# Patient Record
Sex: MALE | Race: WHITE | NOT HISPANIC OR LATINO | Employment: OTHER | URBAN - METROPOLITAN AREA
[De-identification: names, ages, dates, MRNs, and addresses within clinical notes are randomized per-mention and may not be internally consistent; named-entity substitution may affect disease eponyms.]

---

## 2023-04-26 LAB
ALANINE AMINOTRANSFERASE (SGPT) (U/L) IN SER/PLAS: 13 U/L (ref 10–52)
ALBUMIN (G/DL) IN SER/PLAS: 4.3 G/DL (ref 3.4–5)
ALKALINE PHOSPHATASE (U/L) IN SER/PLAS: 52 U/L (ref 33–136)
ANION GAP IN SER/PLAS: 9 MMOL/L (ref 10–20)
ASPARTATE AMINOTRANSFERASE (SGOT) (U/L) IN SER/PLAS: 14 U/L (ref 9–39)
BILIRUBIN TOTAL (MG/DL) IN SER/PLAS: 0.9 MG/DL (ref 0–1.2)
CALCIUM (MG/DL) IN SER/PLAS: 9.2 MG/DL (ref 8.6–10.3)
CARBON DIOXIDE, TOTAL (MMOL/L) IN SER/PLAS: 29 MMOL/L (ref 21–32)
CHLORIDE (MMOL/L) IN SER/PLAS: 108 MMOL/L (ref 98–107)
CHOLESTEROL (MG/DL) IN SER/PLAS: 164 MG/DL (ref 0–199)
CHOLESTEROL IN HDL (MG/DL) IN SER/PLAS: 26.2 MG/DL
CHOLESTEROL/HDL RATIO: 6.3
CREATININE (MG/DL) IN SER/PLAS: 1.01 MG/DL (ref 0.5–1.3)
GFR MALE: 80 ML/MIN/1.73M2
GLUCOSE (MG/DL) IN SER/PLAS: 103 MG/DL (ref 74–99)
LDL: 120 MG/DL (ref 0–99)
POTASSIUM (MMOL/L) IN SER/PLAS: 4.2 MMOL/L (ref 3.5–5.3)
PROTEIN TOTAL: 6.9 G/DL (ref 6.4–8.2)
SODIUM (MMOL/L) IN SER/PLAS: 142 MMOL/L (ref 136–145)
TRIGLYCERIDE (MG/DL) IN SER/PLAS: 87 MG/DL (ref 0–149)
UREA NITROGEN (MG/DL) IN SER/PLAS: 28 MG/DL (ref 6–23)
VLDL: 17 MG/DL (ref 0–40)

## 2023-06-30 ENCOUNTER — HOSPITAL ENCOUNTER (OUTPATIENT)
Dept: DATA CONVERSION | Facility: HOSPITAL | Age: 73
End: 2023-06-30
Attending: INTERNAL MEDICINE | Admitting: INTERNAL MEDICINE
Payer: MEDICARE

## 2023-06-30 DIAGNOSIS — I34.1 NONRHEUMATIC MITRAL (VALVE) PROLAPSE: ICD-10-CM

## 2023-06-30 DIAGNOSIS — I34.0 NONRHEUMATIC MITRAL (VALVE) INSUFFICIENCY: ICD-10-CM

## 2023-06-30 DIAGNOSIS — R94.39 ABNORMAL RESULT OF OTHER CARDIOVASCULAR FUNCTION STUDY: ICD-10-CM

## 2023-06-30 DIAGNOSIS — I35.0 NONRHEUMATIC AORTIC (VALVE) STENOSIS: ICD-10-CM

## 2023-06-30 DIAGNOSIS — I20.0 UNSTABLE ANGINA (MULTI): ICD-10-CM

## 2023-06-30 DIAGNOSIS — E78.5 HYPERLIPIDEMIA, UNSPECIFIED: ICD-10-CM

## 2023-06-30 DIAGNOSIS — Z86.73 PERSONAL HISTORY OF TRANSIENT ISCHEMIC ATTACK (TIA), AND CEREBRAL INFARCTION WITHOUT RESIDUAL DEFICITS: ICD-10-CM

## 2023-06-30 LAB
ANION GAP IN SER/PLAS: 13 MMOL/L (ref 10–20)
CALCIUM (MG/DL) IN SER/PLAS: 9.1 MG/DL (ref 8.6–10.3)
CARBON DIOXIDE, TOTAL (MMOL/L) IN SER/PLAS: 23 MMOL/L (ref 21–32)
CHLORIDE (MMOL/L) IN SER/PLAS: 108 MMOL/L (ref 98–107)
CREATININE (MG/DL) IN SER/PLAS: 1.29 MG/DL (ref 0.5–1.3)
ERYTHROCYTE DISTRIBUTION WIDTH (RATIO) BY AUTOMATED COUNT: 15.5 % (ref 11.5–14.5)
ERYTHROCYTE MEAN CORPUSCULAR HEMOGLOBIN CONCENTRATION (G/DL) BY AUTOMATED: 30.6 G/DL (ref 32–36)
ERYTHROCYTE MEAN CORPUSCULAR VOLUME (FL) BY AUTOMATED COUNT: 87 FL (ref 80–100)
ERYTHROCYTES (10*6/UL) IN BLOOD BY AUTOMATED COUNT: 4.63 X10E12/L (ref 4.5–5.9)
GFR MALE: 59 ML/MIN/1.73M2
GLUCOSE (MG/DL) IN SER/PLAS: 99 MG/DL (ref 74–99)
HEMATOCRIT (%) IN BLOOD BY AUTOMATED COUNT: 40.5 % (ref 41–52)
HEMOGLOBIN (G/DL) IN BLOOD: 12.4 G/DL (ref 13.5–17.5)
LEUKOCYTES (10*3/UL) IN BLOOD BY AUTOMATED COUNT: 12.1 X10E9/L (ref 4.4–11.3)
NRBC (PER 100 WBCS) BY AUTOMATED COUNT: 0 /100 WBC (ref 0–0)
PATIENT TEMPERATURE: 37 DEGREES
PLATELETS (10*3/UL) IN BLOOD AUTOMATED COUNT: 554 X10E9/L (ref 150–450)
POCT BASE EXCESS, MIXED: -1.1 MMOL/L
POCT BASE EXCESS, MIXED: -1.8 MMOL/L
POCT BASE EXCESS, MIXED: 0.3 MMOL/L
POCT HCO3, MIXED: 23.7 MMOL/L
POCT HCO3, MIXED: 25.3 MMOL/L
POCT HCO3, MIXED: 26.5 MMOL/L
POCT OXY HEMOGLOBIN, MIXED: 73.4 % (ref 45–75)
POCT OXY HEMOGLOBIN, MIXED: 73.6 % (ref 45–75)
POCT OXY HEMOGLOBIN, MIXED: 97.5 % (ref 45–75)
POCT PCO2, MIXED: 42 MMHG
POCT PCO2, MIXED: 48 MMHG
POCT PCO2, MIXED: 48 MMHG
POCT PH, MIXED: 7.33
POCT PH, MIXED: 7.35
POCT PH, MIXED: 7.36
POCT PO2, MIXED: 156 MMHG
POCT PO2, MIXED: 48 MMHG
POCT PO2, MIXED: 49 MMHG
POCT SO2, MIXED: 75 %
POCT SO2, MIXED: 75 %
POCT SO2, MIXED: 99 %
POTASSIUM (MMOL/L) IN SER/PLAS: 4.2 MMOL/L (ref 3.5–5.3)
SODIUM (MMOL/L) IN SER/PLAS: 140 MMOL/L (ref 136–145)
UREA NITROGEN (MG/DL) IN SER/PLAS: 39 MG/DL (ref 6–23)

## 2023-07-21 LAB
ANION GAP IN SER/PLAS: 9 MMOL/L (ref 10–20)
CALCIUM (MG/DL) IN SER/PLAS: 9.3 MG/DL (ref 8.6–10.3)
CARBON DIOXIDE, TOTAL (MMOL/L) IN SER/PLAS: 30 MMOL/L (ref 21–32)
CHLORIDE (MMOL/L) IN SER/PLAS: 105 MMOL/L (ref 98–107)
CREATININE (MG/DL) IN SER/PLAS: 1.49 MG/DL (ref 0.5–1.3)
GFR MALE: 50 ML/MIN/1.73M2
GLUCOSE (MG/DL) IN SER/PLAS: 111 MG/DL (ref 74–99)
POTASSIUM (MMOL/L) IN SER/PLAS: 4.4 MMOL/L (ref 3.5–5.3)
SODIUM (MMOL/L) IN SER/PLAS: 140 MMOL/L (ref 136–145)
UREA NITROGEN (MG/DL) IN SER/PLAS: 30 MG/DL (ref 6–23)

## 2023-08-01 ENCOUNTER — PATIENT OUTREACH (OUTPATIENT)
Dept: CARE COORDINATION | Facility: CLINIC | Age: 73
End: 2023-08-01
Payer: MEDICARE

## 2023-08-01 NOTE — PROGRESS NOTES
Medications  Medications reviewed with patient/caregiver?: Yes (8/1/2023 10:49 AM)  Is the patient having any side effects they believe may be caused by any medication additions or changes?: No (8/1/2023 10:49 AM)  Does the patient have all medications ordered at discharge?: Yes (8/1/2023 10:49 AM)  Care Management Interventions: No intervention needed (8/1/2023 10:49 AM)  Is the patient taking all medications as directed (includes completed medication regime)?: Yes (8/1/2023 10:49 AM)    Appointments  Does the patient have a primary care provider?: Yes (Multiple follow up appts are scheduled) (8/1/2023 10:49 AM)  Care Management Interventions: Verified appointment date/time/provider (8/1/2023 10:49 AM)  Has the patient kept scheduled appointments due by today?: Not applicable (8/1/2023 10:49 AM)    Self Management  What is the home health agency?: [facility] (8/1/2023 10:49 AM)  Has home health visited the patient within 72 hours of discharge?: Call prior to 72 hours (8/1/2023 10:49 AM)  Has all Durable Medical Equipment (DME) been delivered?: Yes (using his IS) (8/1/2023 10:49 AM)    Patient Teaching  Does the patient have access to their discharge instructions?: Yes (8/1/2023 10:49 AM)  Care Management Interventions: Reviewed instructions with patient (8/1/2023 10:49 AM)  What is the patient's perception of their health status since discharge?: Improving (8/1/2023 10:49 AM)  Is the patient/caregiver able to teach back the hierarchy of who to call/visit for symptoms/problems? PCP, Specialist, Home Health nurse, Urgent Care, ED, 911: Yes (8/1/2023 10:49 AM)    Wrap Up  Wrap Up Additional Comments: Waldo is doing well, pin well managed, using the IS sporatically-stressed the importance of continued use.  Ambulating. Home care will be calling later today to schedule a visit tomorrow.  Has good supports, denies needs (8/1/2023 10:49 AM)

## 2023-08-08 LAB
ANION GAP IN SER/PLAS: 14 MMOL/L (ref 10–20)
CALCIUM (MG/DL) IN SER/PLAS: 9.6 MG/DL (ref 8.6–10.3)
CARBON DIOXIDE, TOTAL (MMOL/L) IN SER/PLAS: 30 MMOL/L (ref 21–32)
CHLORIDE (MMOL/L) IN SER/PLAS: 102 MMOL/L (ref 98–107)
CREATININE (MG/DL) IN SER/PLAS: 1.17 MG/DL (ref 0.5–1.3)
ERYTHROCYTE DISTRIBUTION WIDTH (RATIO) BY AUTOMATED COUNT: 15.9 % (ref 11.5–14.5)
ERYTHROCYTE MEAN CORPUSCULAR HEMOGLOBIN CONCENTRATION (G/DL) BY AUTOMATED: 30.8 G/DL (ref 32–36)
ERYTHROCYTE MEAN CORPUSCULAR VOLUME (FL) BY AUTOMATED COUNT: 87 FL (ref 80–100)
ERYTHROCYTES (10*6/UL) IN BLOOD BY AUTOMATED COUNT: 4.57 X10E12/L (ref 4.5–5.9)
GFR MALE: 67 ML/MIN/1.73M2
GLUCOSE (MG/DL) IN SER/PLAS: 112 MG/DL (ref 74–99)
HEMATOCRIT (%) IN BLOOD BY AUTOMATED COUNT: 39.9 % (ref 41–52)
HEMOGLOBIN (G/DL) IN BLOOD: 12.3 G/DL (ref 13.5–17.5)
LEUKOCYTES (10*3/UL) IN BLOOD BY AUTOMATED COUNT: 14.2 X10E9/L (ref 4.4–11.3)
MAGNESIUM (MG/DL) IN SER/PLAS: 2.07 MG/DL (ref 1.6–2.4)
NRBC (PER 100 WBCS) BY AUTOMATED COUNT: 0.2 /100 WBC (ref 0–0)
PLATELETS (10*3/UL) IN BLOOD AUTOMATED COUNT: 603 X10E9/L (ref 150–450)
POTASSIUM (MMOL/L) IN SER/PLAS: 5.8 MMOL/L (ref 3.5–5.3)
SODIUM (MMOL/L) IN SER/PLAS: 140 MMOL/L (ref 136–145)
UREA NITROGEN (MG/DL) IN SER/PLAS: 21 MG/DL (ref 6–23)

## 2023-09-19 DIAGNOSIS — I48.91 ATRIAL FIBRILLATION, UNSPECIFIED TYPE (MULTI): Primary | ICD-10-CM

## 2023-09-19 LAB
INR IN PPP BY COAGULATION ASSAY EXTERNAL: 2.1 (ref 2–3)
PROTHROMBIN TIME (PT) IN PPP BY COAGULATION ASSAY EXTERNAL: NORMAL SECONDS
SARS-COV-2 RESULT: DETECTED

## 2023-09-25 PROBLEM — I48.91 ATRIAL FIBRILLATION (MULTI): Status: ACTIVE | Noted: 2023-09-25

## 2023-09-30 ENCOUNTER — TRANSCRIBE ORDERS (OUTPATIENT)
Dept: CARDIOLOGY | Facility: HOSPITAL | Age: 73
End: 2023-09-30
Payer: MEDICARE

## 2023-09-30 DIAGNOSIS — Z98.890 PERSONAL HISTORY OF SURGERY TO HEART AND GREAT VESSELS, PRESENTING HAZARDS TO HEALTH: Primary | ICD-10-CM

## 2023-10-02 ENCOUNTER — CLINICAL SUPPORT (OUTPATIENT)
Dept: CARDIAC REHAB | Facility: CLINIC | Age: 73
End: 2023-10-02
Payer: MEDICARE

## 2023-10-02 DIAGNOSIS — Z98.890 PERSONAL HISTORY OF SURGERY TO HEART AND GREAT VESSELS, PRESENTING HAZARDS TO HEALTH: ICD-10-CM

## 2023-10-02 PROCEDURE — 93798 PHYS/QHP OP CAR RHAB W/ECG: CPT | Performed by: INTERNAL MEDICINE

## 2023-10-02 NOTE — PROGRESS NOTES
Cardiac Rehabilitation Initial Assessment (iITP)    Name: Waldo Cohen  Medical Record Number: [id number]  YOB: 1950    Today’s Date: 10/2/2023  Primary Care Physician: Christopher Meyers MD  Referring Physician: ***    General  1. Personal history of surgery to heart and great vessels, presenting hazards to health  Follow Up In Cardiac Rehab          AACR Risk Stratification:{AACR Risk Stratification:77067}    Medical History  Past Medical History:   Diagnosis Date    Body mass index (BMI) 28.0-28.9, adult     BMI 28.0-28.9,adult    Cerebral infarction, unspecified (CMS/HCC) 10/24/2016    Cryptogenic stroke    Gastrojejunal ulcer, unspecified as acute or chronic, without hemorrhage or perforation     Sepsis-related gastrointestinal ulceration    Overweight     Overweight (BMI 25.0-29.9)    Personal history of other diseases of the circulatory system     History of coronary atherosclerosis     Past Surgical History:   Procedure Laterality Date    MR HEAD ANGIO WO IV CONTRAST  3/1/2022    MR HEAD ANGIO WO IV CONTRAST 3/1/2022 [facility]    MR NECK ANGIO WO IV CONTRAST  3/1/2022    MR NECK ANGIO WO IV CONTRAST 3/1/2022 [facility]    OTHER SURGICAL HISTORY  02/25/2022    Finger amputation    OTHER SURGICAL HISTORY  03/14/2022    Colonoscopy     Allergies: Not on File    Psychosocial Assessment  Patient is returning for a follow up visit after reporting minimal to mild depressive symptoms.     Please document a new PHQ-9 score.    Pt reported/currently experiencing: {Yes/No:50862}  Currently seeing a mental health provider: {Yes/No:52889}  Social Support: {Yes/No:80347}    Learning assessment/barriers: {Assessment/barriers:34701}  Preferred learning method: {Preferred learning method:99610}   Quality of Life Survey:{Quality of Life Survey:75435}    Educational Assessment:  Cardiac Knowledge Test: ***/15    Stages of Change:{Stages of change:39837}    Psychosocial Goals:  ***  Psychosocial Interventions/Education: ***        Nutrition Assessment:    Hyperlipidemia: {Yes/No:77985} ***    Lipids: ***  Lipid Draw Date: ***    Current Dietary Guidelines:{Dietary Guidelines:44986}  Barriers to dietary change: {Yes/No:19855} ***    Diet Habit Survey score: ***    Diabetes Assessment    Diabetes:{Yes or No:50804}   Medication: {Medication:90804}  Last Hemoglobin A1C: ***   Last Hemoglobin A1C date: ***  Pt monitors BS at home: {Yes/No:88021}  Frequency: ***  FBS range: ***  Hypoglycemic Episodes: {Yes/No:15820}    Weight Management:    There were no vitals filed for this visit.  There is no height or weight on file to calculate BMI.    Nutrition Goals: ***  Nutrition Interventions/Education: ***    Exercise Assessment:  Current Home Exercise: {Yes/No:18355}  Mode: ***  Days/Week: ***  Min/Day: ***    Exercise Prescription:    Based on: {{Exercise Prescription:14621}   Frequency:  *** days/week   Mode: {Mode:67890}   Duration: *** total aerobic minutes   Intensity: RPE ***       Target HR ***       METS ***       SpO2 Range *** %       O2 Flow Rate *** L/min     Modality METS Load Duration   1 Pre-Exercise *** *** ***   2 Treadmill *** *** *** ***   3 Nustep 4000 *** *** *** ***   4 Recumbent Cycle *** *** *** ***   5 Weights *** *** *** ***   6 Post-Exercise *** *** ***     Exercise Goals: ***  Exercise Interventions/Education: ***    Other Core Components/Risk Factor Assessment:    Medication adherence:   Current Medications:   Current Outpatient Medications   Medication Sig Dispense Refill    atorvastatin (Lipitor) 40 mg tablet TAKE 1 TABLET BY MOUTH ONCE DAILY 90 tablet 3    atorvastatin (Lipitor) 40 mg tablet TAKE 1 TABLET BY MOUTH ONCE DAILY 30 tablet 0    brivaracetam (Briviact) 50 mg tablet tablet TAKE 1 TABLET BY MOUTH TWO TIMES A DAY 60 tablet 5    chlorhexidine (Hibiclens) 4 % external liquid USE AS DIRECTED 118 mL 0    chlorhexidine (Peridex) 0.12 % solution RINSE MOUTH WITH 15ML  (1 CAPFUL) FOR 30 SECONDS IN THE MORNING AND IN THE EVENING AFTER TOOTHBRUSHING. SPIT OUT AFTER RINSING. DO NOT SWALLOW 473 mL 0    furosemide (Lasix) 20 mg tablet TAKE 1 TABLET BY MOUTH ONCE DAILY FOR 5 DAYS 5 tablet 0    oxyCODONE-acetaminophen (Percocet) 5-325 mg tablet TAKE 1 TABLET BY MOUTH EVERY 6 HOURS FOR 4 DAYS AS NEEDED FOR SEVERE POST SURGICAL PAIN. MAY TAKE TYLENOL WHEN NO LONGER TAKING PERCOCET. 16 tablet 0    pantoprazole (ProtoNix) 40 mg EC tablet TAKE 1 TABLET BY MOUTH TWO TIMES A  tablet 1    warfarin (Coumadin) 2.5 mg tablet TAKE 1 TABLET BY MOUTH ONCE DAILY AS DIRECTED 90 tablet 3    warfarin (Coumadin) 2.5 mg tablet TAKE 1 TABLET BY MOUTH ONCE DAILY 30 tablet 0     No current facility-administered medications for this visit.                 Taking medications as prescribed: {Yes/No:22668}   If no, why: ***   Uses pill box/organizer: {Yes/No:97092}   Carries medication list: {Yes/No:30037}    Blood Pressure Management:  Hx of Hypertension: {Yes/No:57757}  Resting BP: Growth %ile SmartLinks can only be used for patients less than 20 years old.     Heart Failure Management:  Hx of Heart Failure:{Yes/No:56124}  Type (selection): {Type (selection):07702}  Most recent EF: @LVEF%@  Date:***  Onset of heart failure diagnosis: ***  Last heart failure hospitalization: ***  Number of HF admissions per year: ***  Symptoms:{Symptoms:06397}  Is there a family Hx of HF:{Yes/No:21890}  Does patient obtain daily weight {Yes/No:98621}    Smoking/Tobacco Assessment:    Tobacco Use: Not on file       Other Core Component Goals: ***  Other Core Component Interventions/Education: ***       Individual Patient Goals:  ***      Rehaab Staff Signature: Zoie Leroy RN  Electronic MD Review Signature: ***

## 2023-10-03 ENCOUNTER — ANTICOAGULATION - WARFARIN VISIT (OUTPATIENT)
Dept: CARDIOLOGY | Facility: CLINIC | Age: 73
End: 2023-10-03
Payer: MEDICARE

## 2023-10-03 DIAGNOSIS — I48.91 ATRIAL FIBRILLATION, UNSPECIFIED TYPE (MULTI): Primary | ICD-10-CM

## 2023-10-03 LAB
POC INR: 1.6
POC PROTHROMBIN TIME: NORMAL

## 2023-10-03 PROCEDURE — 85610 PROTHROMBIN TIME: CPT | Mod: QW | Performed by: INTERNAL MEDICINE

## 2023-10-03 PROCEDURE — 99211 OFF/OP EST MAY X REQ PHY/QHP: CPT | Mod: PO | Performed by: INTERNAL MEDICINE

## 2023-10-03 PROCEDURE — 85610 PROTHROMBIN TIME: CPT | Mod: PO

## 2023-10-03 PROCEDURE — 99211 OFF/OP EST MAY X REQ PHY/QHP: CPT | Performed by: INTERNAL MEDICINE

## 2023-10-03 NOTE — PROGRESS NOTES
Patient identification verified with 2 identifiers.    Location: [facility] - [address] [place] [id number] [phone]     Referring Physician:  Dr. Pramod Weathers  Enrollment/ Re-enrollment date:  8/3/24   INR Goal: 2.0-3.0  INR monitoring is per [facility] protocol.  Anticoagulation Medication: warfarin  Indication: atrial fibrillation    Subjective   Bleeding signs/symptoms: No    Bruising: No   Major bleeding event: No  Thrombosis signs/symptoms: No  Thromboembolic event: No  Missed doses: No  Extra doses: No  Medication changes: No  Dietary changes: No  Change in health: No  Change in activity: No  Alcohol: No  Other concerns: No    Upcoming Surgeries:  Does the Patient Have any upcoming surgeries that require interruption in anticoagulation therapy? no  Does the patient require bridging? no      Anticoagulation Summary  As of 10/3/2023      INR goal:  2.0-3.0   TTR:  0.0 % (4 d)   INR used for dosin.60 (10/3/2023)   Weekly warfarin total:  45 mg               Assessment/Plan   Subtherapeutic     1. New dose:  Dose maintained but one time dose given tomorrow.   Pt has been therapeutic on current dose.  No explanation for low INR.  Will give a one time dose then maintain current dose and recheck INR in 1 week.  2. Next INR: 1 week      Education provided to patient during the visit:  Patient instructed to call in interim with questions, concerns and changes.

## 2023-10-04 ENCOUNTER — CLINICAL SUPPORT (OUTPATIENT)
Dept: CARDIAC REHAB | Facility: CLINIC | Age: 73
End: 2023-10-04
Payer: MEDICARE

## 2023-10-04 ENCOUNTER — APPOINTMENT (OUTPATIENT)
Dept: CARDIAC REHAB | Facility: CLINIC | Age: 73
End: 2023-10-04
Payer: MEDICARE

## 2023-10-04 ENCOUNTER — TRANSCRIBE ORDERS (OUTPATIENT)
Dept: CARDIAC REHAB | Facility: CLINIC | Age: 73
End: 2023-10-04
Payer: MEDICARE

## 2023-10-04 DIAGNOSIS — Z98.890 PERSONAL HISTORY OF SURGERY TO HEART AND GREAT VESSELS, PRESENTING HAZARDS TO HEALTH: ICD-10-CM

## 2023-10-04 DIAGNOSIS — Z98.890 PERSONAL HISTORY OF SURGERY TO HEART AND GREAT VESSELS, PRESENTING HAZARDS TO HEALTH: Primary | ICD-10-CM

## 2023-10-04 PROCEDURE — 93798 PHYS/QHP OP CAR RHAB W/ECG: CPT | Performed by: INTERNAL MEDICINE

## 2023-10-05 PROBLEM — Z98.890 PERSONAL HISTORY OF SURGERY TO HEART AND GREAT VESSELS, PRESENTING HAZARDS TO HEALTH: Status: ACTIVE | Noted: 2023-10-05

## 2023-10-06 ENCOUNTER — CLINICAL SUPPORT (OUTPATIENT)
Dept: CARDIAC REHAB | Facility: CLINIC | Age: 73
End: 2023-10-06
Payer: MEDICARE

## 2023-10-06 ENCOUNTER — APPOINTMENT (OUTPATIENT)
Dept: CARDIAC REHAB | Facility: CLINIC | Age: 73
End: 2023-10-06
Payer: MEDICARE

## 2023-10-06 DIAGNOSIS — Z98.890 PERSONAL HISTORY OF SURGERY TO HEART AND GREAT VESSELS, PRESENTING HAZARDS TO HEALTH: ICD-10-CM

## 2023-10-06 PROCEDURE — 93798 PHYS/QHP OP CAR RHAB W/ECG: CPT | Performed by: INTERNAL MEDICINE

## 2023-10-09 ENCOUNTER — APPOINTMENT (OUTPATIENT)
Dept: CARDIAC REHAB | Facility: CLINIC | Age: 73
End: 2023-10-09
Payer: MEDICARE

## 2023-10-09 ENCOUNTER — ANTICOAGULATION - WARFARIN VISIT (OUTPATIENT)
Dept: CARDIOLOGY | Facility: CLINIC | Age: 73
End: 2023-10-09
Payer: MEDICARE

## 2023-10-09 ENCOUNTER — CLINICAL SUPPORT (OUTPATIENT)
Dept: CARDIAC REHAB | Facility: CLINIC | Age: 73
End: 2023-10-09
Payer: MEDICARE

## 2023-10-09 DIAGNOSIS — I48.91 ATRIAL FIBRILLATION, UNSPECIFIED TYPE (MULTI): Primary | ICD-10-CM

## 2023-10-09 DIAGNOSIS — Z98.890 PERSONAL HISTORY OF SURGERY TO HEART AND GREAT VESSELS, PRESENTING HAZARDS TO HEALTH: ICD-10-CM

## 2023-10-09 LAB
POC INR: 1.6 (ref 2–3)
POC PROTHROMBIN TIME: ABNORMAL

## 2023-10-09 PROCEDURE — 93798 PHYS/QHP OP CAR RHAB W/ECG: CPT | Performed by: INTERNAL MEDICINE

## 2023-10-09 PROCEDURE — 85610 PROTHROMBIN TIME: CPT | Mod: PO

## 2023-10-09 PROCEDURE — 99211 OFF/OP EST MAY X REQ PHY/QHP: CPT | Mod: 27 | Performed by: INTERNAL MEDICINE

## 2023-10-09 PROCEDURE — 85610 PROTHROMBIN TIME: CPT | Mod: QW | Performed by: INTERNAL MEDICINE

## 2023-10-09 NOTE — PROGRESS NOTES
"Patient identification verified with 2 identifiers.    Location: {[facility] LOCATIONS:[address]}    Referring Physician: ***  Enrollment/ Re-enrollment date: ***   INR Goal: 2.0-3.0  INR monitoring is per [facility] protocol.  Anticoagulation Medication: { AMB ANTICO MEDICATION:73625::\"warfarin\"}  Indication: {indication for anticoagulation:82893}    Subjective   Bleeding signs/symptoms:    { ACOAG bleedin}  Bruising:     Major bleeding event:    Thrombosis signs/symptoms:    Thromboembolic event:    Missed doses:    Extra doses:    Medication changes:    Dietary changes:    Change in health:    Change in activity:    Alcohol:    Other concerns:      Upcoming Surgeries:  Does the Patient Have any upcoming surgeries that require interruption in anticoagulation therapy? {yes/no:79436}  Does the patient require bridging? {yes/no:01452}      Anticoagulation Summary  As of 10/9/2023      INR goal:  2.0-3.0   TTR:  0.0 % (4 d)   INR used for dosing:                 Assessment/Plan   {therapeutic/subtherapeutic/supratherapeutic:60044}     1. New dose: {no change:15490}    2. Next INR: {time frame/weeks 1-4:87692}      Education provided to patient during the visit:  {Antico Pt Ed. documentation:73162::\"Patient instructed to call in interim with questions, concerns and changes. \"}       "

## 2023-10-09 NOTE — PROGRESS NOTES
Patient identification verified with 2 identifiers.    Location: [facility] - [address] [place] [id number] [phone]     Referring Physician: Dr. Pramod Weathers  Enrollment/ Re-enrollment date: 8/3/2024   INR Goal: 2.0-3.0  INR monitoring is per [facility] protocol.  Anticoagulation Medication: warfarin  Indication: atrial fibrillation    Subjective   Bleeding signs/symptoms: No    Bruising: No   Major bleeding event: No  Thrombosis signs/symptoms: No  Thromboembolic event: No  Missed doses: No  Extra doses: No  Medication changes: No  Dietary changes: No  Change in health: No  Change in activity: No  Alcohol: No  Other concerns: No    Upcoming Surgeries:  Does the Patient Have any upcoming surgeries that require interruption in anticoagulation therapy? no  Does the patient require bridging? no      Anticoagulation Summary  As of 10/9/2023      INR goal:  2.0-3.0   TTR:  0.0 % (1.4 wk)   INR used for dosin.60 (10/9/2023)   Weekly warfarin total:  52.5 mg               Assessment/Plan   Subtherapeutic     1. New dose:  Will Increase Pt's weekly dosage from 47.5mg to 52.5mg.  Pt denies any missed doses of warfarin.     2. Next INR: 1 week.  Pt scheduled to RTC in 2 weeks.  He is going out of town tomorrow to [place] and [place] and will return 10/21.      Education provided to patient during the visit:  Patient instructed to call in interim with questions, concerns and changes.   Patient educated on interactions between medications and warfarin.   Patient educated on dietary consistency in vitamin k consumption.   Patient educated on affects of alcohol consumption while taking warfarin.   Patient educated on signs of bleeding/clotting.   Patient educated on compliance with dosing, follow up appointments, and prescribed plan of care.

## 2023-10-11 ENCOUNTER — APPOINTMENT (OUTPATIENT)
Dept: CARDIAC REHAB | Facility: CLINIC | Age: 73
End: 2023-10-11
Payer: MEDICARE

## 2023-10-13 ENCOUNTER — PHARMACY VISIT (OUTPATIENT)
Dept: PHARMACY | Facility: CLINIC | Age: 73
End: 2023-10-13
Payer: COMMERCIAL

## 2023-10-13 ENCOUNTER — APPOINTMENT (OUTPATIENT)
Dept: CARDIAC REHAB | Facility: CLINIC | Age: 73
End: 2023-10-13
Payer: MEDICARE

## 2023-10-13 DIAGNOSIS — K21.9 GASTROESOPHAGEAL REFLUX DISEASE WITHOUT ESOPHAGITIS: Primary | ICD-10-CM

## 2023-10-13 RX ORDER — PANTOPRAZOLE SODIUM 40 MG/1
40 TABLET, DELAYED RELEASE ORAL 2 TIMES DAILY
Qty: 180 TABLET | Refills: 1 | Status: SHIPPED | OUTPATIENT
Start: 2023-10-13 | End: 2023-11-07 | Stop reason: WASHOUT

## 2023-10-16 ENCOUNTER — APPOINTMENT (OUTPATIENT)
Dept: CARDIAC REHAB | Facility: CLINIC | Age: 73
End: 2023-10-16
Payer: MEDICARE

## 2023-10-18 ENCOUNTER — APPOINTMENT (OUTPATIENT)
Dept: CARDIAC REHAB | Facility: CLINIC | Age: 73
End: 2023-10-18
Payer: MEDICARE

## 2023-10-20 ENCOUNTER — APPOINTMENT (OUTPATIENT)
Dept: CARDIAC REHAB | Facility: CLINIC | Age: 73
End: 2023-10-20
Payer: MEDICARE

## 2023-10-23 ENCOUNTER — ANTICOAGULATION - WARFARIN VISIT (OUTPATIENT)
Dept: CARDIOLOGY | Facility: CLINIC | Age: 73
End: 2023-10-23
Payer: MEDICARE

## 2023-10-23 ENCOUNTER — APPOINTMENT (OUTPATIENT)
Dept: CARDIAC REHAB | Facility: CLINIC | Age: 73
End: 2023-10-23
Payer: MEDICARE

## 2023-10-23 ENCOUNTER — CLINICAL SUPPORT (OUTPATIENT)
Dept: CARDIAC REHAB | Facility: CLINIC | Age: 73
End: 2023-10-23
Payer: MEDICARE

## 2023-10-23 DIAGNOSIS — I48.91 ATRIAL FIBRILLATION, UNSPECIFIED TYPE (MULTI): Primary | ICD-10-CM

## 2023-10-23 DIAGNOSIS — Z98.890 PERSONAL HISTORY OF SURGERY TO HEART AND GREAT VESSELS, PRESENTING HAZARDS TO HEALTH: ICD-10-CM

## 2023-10-23 LAB
POC INR: 2.6 (ref 2–3)
POC PROTHROMBIN TIME: NORMAL

## 2023-10-23 PROCEDURE — 99211 OFF/OP EST MAY X REQ PHY/QHP: CPT | Performed by: INTERNAL MEDICINE

## 2023-10-23 PROCEDURE — 85610 PROTHROMBIN TIME: CPT | Mod: QW | Performed by: INTERNAL MEDICINE

## 2023-10-23 PROCEDURE — 93798 PHYS/QHP OP CAR RHAB W/ECG: CPT | Performed by: INTERNAL MEDICINE

## 2023-10-23 NOTE — PROGRESS NOTES
Patient identification verified with 2 identifiers.    Location: [facility] - [address] [place] [id number] [phone]     Referring Physician: Dr. Pramod Weathers  Enrollment/ Re-enrollment date: 8/3/2024   INR Goal: 2.0-3.0  INR monitoring is per [facility] protocol.  Anticoagulation Medication: warfarin  Indication: atrial fibrillation    Subjective   Bleeding signs/symptoms: Yes.  Pt states that he has prolonged bleeding from a dog scratch.    Bruising: Yes.  Pt noted that he has some increased bruising on his arms.  Major bleeding event: No  Thrombosis signs/symptoms: No  Thromboembolic event: No  Missed doses: No  Extra doses: No  Medication changes: No  Dietary changes: No  Change in health: No  Change in activity: No  Alcohol: No  Other concerns: No    Upcoming Surgeries:  Does the Patient Have any upcoming surgeries that require interruption in anticoagulation therapy? no  Does the patient require bridging? no    An Increase was made in weekly dosage last visit.  47.5mg weekly to 52.5mg.  Anticoagulation Summary  As of 10/23/2023      INR goal:  2.0-3.0   TTR:  35.0 % (3.4 wk)   INR used for dosin.60 (10/23/2023)   Weekly warfarin total:  52.5 mg               Assessment/Plan   Subtherapeutic     1. New dose: no change    2. Next INR: 2 weeks.  Pt scheduled to RTC in 2 weeks and can be r/s in 4 weeks if therapeutic next visit.      Education provided to patient during the visit:  Patient instructed to call in interim with questions, concerns and changes.   Patient educated on interactions between medications and warfarin.   Patient educated on dietary consistency in vitamin k consumption.   Patient educated on affects of alcohol consumption while taking warfarin.   Patient educated on signs of bleeding/clotting.   Patient educated on compliance with dosing, follow up appointments, and prescribed plan of care.

## 2023-10-25 ENCOUNTER — CLINICAL SUPPORT (OUTPATIENT)
Dept: CARDIAC REHAB | Facility: CLINIC | Age: 73
End: 2023-10-25
Payer: MEDICARE

## 2023-10-25 ENCOUNTER — APPOINTMENT (OUTPATIENT)
Dept: CARDIAC REHAB | Facility: CLINIC | Age: 73
End: 2023-10-25
Payer: MEDICARE

## 2023-10-25 DIAGNOSIS — Z98.890 PERSONAL HISTORY OF SURGERY TO HEART AND GREAT VESSELS, PRESENTING HAZARDS TO HEALTH: ICD-10-CM

## 2023-10-25 PROCEDURE — 93798 PHYS/QHP OP CAR RHAB W/ECG: CPT | Performed by: INTERNAL MEDICINE

## 2023-10-26 ENCOUNTER — TELEPHONE (OUTPATIENT)
Dept: CARDIOLOGY | Facility: CLINIC | Age: 73
End: 2023-10-26
Payer: MEDICARE

## 2023-10-26 DIAGNOSIS — I48.91 ATRIAL FIBRILLATION, UNSPECIFIED TYPE (MULTI): Primary | ICD-10-CM

## 2023-10-27 ENCOUNTER — CLINICAL SUPPORT (OUTPATIENT)
Dept: CARDIAC REHAB | Facility: CLINIC | Age: 73
End: 2023-10-27
Payer: MEDICARE

## 2023-10-27 ENCOUNTER — APPOINTMENT (OUTPATIENT)
Dept: CARDIAC REHAB | Facility: CLINIC | Age: 73
End: 2023-10-27
Payer: MEDICARE

## 2023-10-27 DIAGNOSIS — Z98.890 PERSONAL HISTORY OF SURGERY TO HEART AND GREAT VESSELS, PRESENTING HAZARDS TO HEALTH: ICD-10-CM

## 2023-10-27 PROCEDURE — 93798 PHYS/QHP OP CAR RHAB W/ECG: CPT | Performed by: INTERNAL MEDICINE

## 2023-10-27 RX ORDER — WARFARIN 2.5 MG/1
2.5 TABLET ORAL
Qty: 90 TABLET | Refills: 3 | Status: SHIPPED | OUTPATIENT
Start: 2023-10-27 | End: 2023-11-28 | Stop reason: SDUPTHER

## 2023-10-30 ENCOUNTER — APPOINTMENT (OUTPATIENT)
Dept: CARDIAC REHAB | Facility: CLINIC | Age: 73
End: 2023-10-30
Payer: MEDICARE

## 2023-10-30 ENCOUNTER — CLINICAL SUPPORT (OUTPATIENT)
Dept: CARDIAC REHAB | Facility: CLINIC | Age: 73
End: 2023-10-30
Payer: MEDICARE

## 2023-10-30 DIAGNOSIS — Z98.890 PERSONAL HISTORY OF SURGERY TO HEART AND GREAT VESSELS, PRESENTING HAZARDS TO HEALTH: ICD-10-CM

## 2023-10-30 PROCEDURE — 93798 PHYS/QHP OP CAR RHAB W/ECG: CPT | Performed by: INTERNAL MEDICINE

## 2023-11-01 ENCOUNTER — CLINICAL SUPPORT (OUTPATIENT)
Dept: CARDIAC REHAB | Facility: CLINIC | Age: 73
End: 2023-11-01
Payer: MEDICARE

## 2023-11-01 ENCOUNTER — APPOINTMENT (OUTPATIENT)
Dept: CARDIAC REHAB | Facility: CLINIC | Age: 73
End: 2023-11-01
Payer: MEDICARE

## 2023-11-01 DIAGNOSIS — Z98.890 PERSONAL HISTORY OF SURGERY TO HEART AND GREAT VESSELS, PRESENTING HAZARDS TO HEALTH: ICD-10-CM

## 2023-11-01 PROCEDURE — 93798 PHYS/QHP OP CAR RHAB W/ECG: CPT

## 2023-11-03 ENCOUNTER — CLINICAL SUPPORT (OUTPATIENT)
Dept: CARDIAC REHAB | Facility: CLINIC | Age: 73
End: 2023-11-03
Payer: MEDICARE

## 2023-11-03 ENCOUNTER — APPOINTMENT (OUTPATIENT)
Dept: CARDIAC REHAB | Facility: CLINIC | Age: 73
End: 2023-11-03
Payer: MEDICARE

## 2023-11-03 DIAGNOSIS — Z98.890 PERSONAL HISTORY OF SURGERY TO HEART AND GREAT VESSELS, PRESENTING HAZARDS TO HEALTH: ICD-10-CM

## 2023-11-03 PROCEDURE — 93798 PHYS/QHP OP CAR RHAB W/ECG: CPT | Performed by: INTERNAL MEDICINE

## 2023-11-06 ENCOUNTER — APPOINTMENT (OUTPATIENT)
Dept: CARDIAC REHAB | Facility: CLINIC | Age: 73
End: 2023-11-06
Payer: MEDICARE

## 2023-11-07 ENCOUNTER — ANTICOAGULATION - WARFARIN VISIT (OUTPATIENT)
Dept: CARDIOLOGY | Facility: CLINIC | Age: 73
End: 2023-11-07
Payer: MEDICARE

## 2023-11-07 DIAGNOSIS — I48.91 ATRIAL FIBRILLATION, UNSPECIFIED TYPE (MULTI): Primary | ICD-10-CM

## 2023-11-07 DIAGNOSIS — G40.909 SEIZURE DISORDER (MULTI): Primary | ICD-10-CM

## 2023-11-07 LAB
POC INR: 2.1 (ref 2–3)
POC PROTHROMBIN TIME: NORMAL

## 2023-11-07 PROCEDURE — 85610 PROTHROMBIN TIME: CPT | Mod: QW | Performed by: INTERNAL MEDICINE

## 2023-11-07 PROCEDURE — 99211 OFF/OP EST MAY X REQ PHY/QHP: CPT | Mod: 27 | Performed by: INTERNAL MEDICINE

## 2023-11-07 NOTE — PROGRESS NOTES
Refill of Briviact 50mg BID [facility]  OARRS reviewed, no concerns  Follow-up scheduled for 1/4/2024

## 2023-11-07 NOTE — PROGRESS NOTES
Patient identification verified with 2 identifiers.    Location: [facility] - [address] [place] [id number] [phone]     Referring Physician: Dr. Pramod Weathers  Enrollment/ Re-enrollment date: 8/3/2024   INR Goal: 2.0-3.0  INR monitoring is per [facility] protocol.  Anticoagulation Medication: warfarin  Indication: atrial fibrillation    Subjective   Bleeding signs/symptoms: No.  Pt states that he has prolonged bleeding from a dog scratch.    Bruising: No.  Pt noted that he has some increased bruising on his arms.  Major bleeding event: No  Thrombosis signs/symptoms: No  Thromboembolic event: No  Missed doses: No  Extra doses: No  Medication changes: No  Dietary changes: No  Change in health: No  Change in activity: No  Alcohol: No  Other concerns: No    Upcoming Surgeries:  Does the Patient Have any upcoming surgeries that require interruption in anticoagulation therapy? no  Does the patient require bridging? no    Dose maintained after last visit, 10/23/23.  Pty taking 52.5mg weekly.  Anticoagulation Summary  As of 2023      INR goal:  2.0-3.0   TTR:  60.0 % (1.3 mo)   INR used for dosin.10 (2023)   Weekly warfarin total:  52.5 mg               Assessment/Plan   Therapeutic     1. New dose: no change    2. Next INR: 1 month.  Pt can be r/s in 4 weeks if therapeutic next visit.  3. Pt has appt. With Dr. Weathers on .  He is inquiring about stopping warfarin.      Education provided to patient during the visit:  Patient instructed to call in interim with questions, concerns and changes.   Patient educated on interactions between medications and warfarin.   Patient educated on dietary consistency in vitamin k consumption.   Patient educated on affects of alcohol consumption while taking warfarin.   Patient educated on signs of bleeding/clotting.   Patient educated on compliance with dosing, follow up appointments, and prescribed plan of care.

## 2023-11-08 ENCOUNTER — CLINICAL SUPPORT (OUTPATIENT)
Dept: CARDIAC REHAB | Facility: CLINIC | Age: 73
End: 2023-11-08
Payer: MEDICARE

## 2023-11-08 ENCOUNTER — APPOINTMENT (OUTPATIENT)
Dept: CARDIAC REHAB | Facility: CLINIC | Age: 73
End: 2023-11-08
Payer: MEDICARE

## 2023-11-08 DIAGNOSIS — Z98.890 PERSONAL HISTORY OF SURGERY TO HEART AND GREAT VESSELS, PRESENTING HAZARDS TO HEALTH: ICD-10-CM

## 2023-11-08 PROCEDURE — 93798 PHYS/QHP OP CAR RHAB W/ECG: CPT | Performed by: INTERNAL MEDICINE

## 2023-11-08 NOTE — PROGRESS NOTES
INDIVIDUAL CARDIAC TREATMENT PLAN- 60 DAY REASSESSMENT     Name: Waldo Cohen   Today's Date: 23   : 1950    Primary Provider: Christopher Meyers  MRN: [id number]    Referring Physician: Pramod Weathers      Diagnosis: Mitral Valve Repair  Onset Date: 23      Risk Stratification: High      NUTRITION REASSESSMENT  Lipids  Lipid Lab Date: 23  Total Chol:164  HDL:26.2  LDL:120  Tri  Cholesterol Med: Atorvastatin     Diabetes: No  HbA1c: 5.3  Date Checked: 3/01/22  Monitors glucose at home: No  Fasting Blood Sugar Range: NA  Frequency:NA  Hypoglycemic Episode: NA    Weight Management  Weight: 190  Height: 68 IN.  BMI:  28.89  Body Composition: 27.6  Waist Circumference: 41  Current Diet: Heart Healthy  Barriers to dietary change:    Initial Dietary Assessment Score: 71/96      NUTRITION PLAN  Nutrition Goals:   1. Improve Picture Your Plate assessment results by discharge. Reviewed dietary assessment tip sheets with pt.    2. Make changes to diet to include heart healthy options while in the program.  Encouraged attendance to dietician lectures.    Nutrition Intervention/Education:   *Sent dietician Picture Your Plate assessment for scoring and recommendations.   *Perform weekly weight checks on .   *Body Composition completed by Exercise Physiologist.  Education Provided: Registered Dietician Lectures: Healthier Eating Out & Snacking, Heart Healthy Shopping, Cooking & Portion Distortion, Focus for Heart Healthy Eating, Sodium: Shaking the Habit, Current Topics in Nutrition      OTHER CORE COMPONENTS/ RISK FACTORS ASSESSMENT  Medication compliance: good compliance  Using pill box: Yes  Carries medication list: No    Blood Pressure Management:  History of High BP: Yes  Resting BP: 108/70    Tobacco: FORMER  Quit Date:   Other forms of tobacco: NA  Anyone in the house smoke: No    Initial Knowledge Test Score: 14    OTHER CORE COMPONENTS/ RISK FACTOR PLAN   Other Core components/Risk  Factor Goals:                                                                                                                                                      1. Achieve and maintain a resting blood pressure less than 130/80 while in the program. Meeting goal thus far in the program.  2. Gain knowledge of cardiac disease and lifestyle modifications related to exercise and ADL's prior to discharge. Pt actively participates in weekly education lectures.    Other Components/ Risk Factors Intervention/Education:  *Will continue to monitor HR, BP, dyspnea and arrhythmias each session.   *Will meet with patient to discuss goals & progress.  *Encouraged review of education materials.   Education Provided: Peripheral Artery Disease, Living with HF and Hands Only CPR      PSYCHOSOCIAL ASSESSMENT  Patient reported stress level: mild  Using stress management skills: Yes  HX of anxiety: No  HX of depression: Yes  Patient questioned regarding any new stress, depression and anxiety symptoms: Yes    Family/Support System: Wife, family, friends, Taoist  Seeing mental health provider: No  Psychosocial medications: NA    Initial PHQ-9 score: 0  Was PHQ-9 faxed to provider: No  Date faxed: NA    Quality of Life Survey:   PCS: 52.87  MCS:36.59    Stages of change:  Preparation    PSYCHOSOCIAL PLAN  Psychosocial Goals:  1. Increase/maintain PCS and MCS scores by discharge. Pt participated in education lectures.  2. Decrease/maintain PHQ-9 category classification while in the program. Encouraged review of emotional and stress aspects educational handouts with family members.    Psychosocial Interventions/ Education:  * Provided one on one emotional support  *Will facilitate peer support within the context of other phase II patients while in the program.   Education Provided: Stress Series Parts 1-3: The Good, Bad & Ugly, Short-Circuiting Stress, Mastering Stress    EXERCISE ASSESSMENT  Home Exercise: Yes  Frequency: 4-5  fays/wk  Mode: Bike    EXERCISE PLAN  Exercise Goals:   1. Goal of 5.8 METs by discharge.  2. Have a plan in place for continued exercise after the program by discharge.    Exercise Prescription:   Based on 12 Minute Walk Test  Frequency: 3 days per week  Duration (total aerobic min.): 30 minutes  Intensity RPE: 11-14  Target HR:103-136  MET Level Range: 1.7-5.8    Date of first exercise session:     Modality METS Load  Duration   1 Warm Up    05:00   2 Treadmill 5.3 2.7@6%  06:00   3 NuStep 3.6 104@6 6 06:00   4 Weights  60Load  06:00   5 Elliptical 4  2 06:00   6 Recumbent Bike 4 56Watts 3 06:00   7 Cool Down     05:00     Exercise Intervention/Education:   *Aim to progress 1 MET every 6 Weeks or as tolerated. Progressing as expected.  *Incorporate resistance training for muscular endurance and strength. Pt began resistance training and progressing well.  Education Provided: Benefits of Exercise, Enjoying Exercise, Home Exercise, Diabetes and Exercise    LEARNING ASSESSMENT & BARRIERS  Readiness to Learn: NA  Barriers: None  Comments: NA    FALL RISK  high  Comments: NA    INDIVIDUAL PATIENT GOALS:  Improve fitness and stamina by discharge.  2. Lose 5 pounds by discharge.    MEDICATIONS  Current Outpatient Medications   Medication Instructions    atorvastatin (Lipitor) 40 mg tablet TAKE 1 TABLET BY MOUTH ONCE DAILY    atorvastatin (Lipitor) 40 mg tablet TAKE 1 TABLET BY MOUTH ONCE DAILY    brivaracetam (Briviact) 50 mg tablet tablet TAKE 1 TABLET BY MOUTH TWO TIMES A DAY    warfarin (Coumadin) 2.5 mg tablet TAKE 1 TABLET BY MOUTH ONCE DAILY        STAFF COMMENTS: Patient has attended 13/36 sessions of cardiac rehab after [facility] on 7/24/23. Tolerating prescribed workloads well without reports of dyspnea, angina or acute pain that does not resolve with rest. Stable cardiovascular response to exercise. Telemetry shows SR-ST with PVCS. Met with patient one on one to discuss goals and progress in the program. Doing well  in CR and enjoys coming to class. Will continue to adjust workloads as tolerated. Thanks you for allowing us to participate in the care of your patient.

## 2023-11-10 ENCOUNTER — APPOINTMENT (OUTPATIENT)
Dept: CARDIAC REHAB | Facility: CLINIC | Age: 73
End: 2023-11-10
Payer: MEDICARE

## 2023-11-10 ENCOUNTER — CLINICAL SUPPORT (OUTPATIENT)
Dept: CARDIAC REHAB | Facility: CLINIC | Age: 73
End: 2023-11-10
Payer: MEDICARE

## 2023-11-10 DIAGNOSIS — Z98.890 PERSONAL HISTORY OF SURGERY TO HEART AND GREAT VESSELS, PRESENTING HAZARDS TO HEALTH: ICD-10-CM

## 2023-11-10 PROCEDURE — 93798 PHYS/QHP OP CAR RHAB W/ECG: CPT | Performed by: INTERNAL MEDICINE

## 2023-11-13 ENCOUNTER — APPOINTMENT (OUTPATIENT)
Dept: CARDIAC REHAB | Facility: CLINIC | Age: 73
End: 2023-11-13
Payer: MEDICARE

## 2023-11-13 ENCOUNTER — APPOINTMENT (OUTPATIENT)
Dept: LAB | Facility: CLINIC | Age: 73
End: 2023-11-13
Payer: MEDICARE

## 2023-11-13 ENCOUNTER — OFFICE VISIT (OUTPATIENT)
Dept: HEMATOLOGY/ONCOLOGY | Facility: CLINIC | Age: 73
End: 2023-11-13
Payer: MEDICARE

## 2023-11-13 ENCOUNTER — LAB (OUTPATIENT)
Dept: LAB | Facility: CLINIC | Age: 73
End: 2023-11-13
Payer: MEDICARE

## 2023-11-13 VITALS
TEMPERATURE: 97.7 F | SYSTOLIC BLOOD PRESSURE: 111 MMHG | DIASTOLIC BLOOD PRESSURE: 75 MMHG | HEART RATE: 64 BPM | BODY MASS INDEX: 27.84 KG/M2 | OXYGEN SATURATION: 98 % | WEIGHT: 188.49 LBS | RESPIRATION RATE: 16 BRPM

## 2023-11-13 DIAGNOSIS — D47.3 ESSENTIAL (HEMORRHAGIC) THROMBOCYTHEMIA (MULTI): ICD-10-CM

## 2023-11-13 DIAGNOSIS — G45.9 TIA (TRANSIENT ISCHEMIC ATTACK): ICD-10-CM

## 2023-11-13 DIAGNOSIS — D47.3 ESSENTIAL THROMBOCYTHEMIA (MULTI): Primary | ICD-10-CM

## 2023-11-13 DIAGNOSIS — I48.91 ATRIAL FIBRILLATION, UNSPECIFIED TYPE (MULTI): ICD-10-CM

## 2023-11-13 DIAGNOSIS — G40.909 SEIZURE DISORDER (MULTI): ICD-10-CM

## 2023-11-13 DIAGNOSIS — I05.9 MITRAL VALVE DISEASE: ICD-10-CM

## 2023-11-13 LAB
ALBUMIN SERPL BCP-MCNC: 4.7 G/DL (ref 3.4–5)
ALP SERPL-CCNC: 56 U/L (ref 33–136)
ALT SERPL W P-5'-P-CCNC: 22 U/L (ref 10–52)
ANION GAP SERPL CALC-SCNC: 12 MMOL/L (ref 10–20)
AST SERPL W P-5'-P-CCNC: 19 U/L (ref 9–39)
BASOPHILS # BLD AUTO: 0.12 X10*3/UL (ref 0–0.1)
BASOPHILS NFR BLD AUTO: 0.9 %
BILIRUB SERPL-MCNC: 0.9 MG/DL (ref 0–1.2)
BUN SERPL-MCNC: 24 MG/DL (ref 6–23)
CALCIUM SERPL-MCNC: 10.2 MG/DL (ref 8.6–10.3)
CHLORIDE SERPL-SCNC: 104 MMOL/L (ref 98–107)
CO2 SERPL-SCNC: 31 MMOL/L (ref 21–32)
CREAT SERPL-MCNC: 1.16 MG/DL (ref 0.5–1.3)
EOSINOPHIL # BLD AUTO: 0.22 X10*3/UL (ref 0–0.7)
EOSINOPHIL NFR BLD AUTO: 1.7 %
ERYTHROCYTE [DISTWIDTH] IN BLOOD BY AUTOMATED COUNT: 16.6 % (ref 11.5–14.5)
GFR SERPL CREATININE-BSD FRML MDRD: 68 ML/MIN/1.73M*2
GLUCOSE SERPL-MCNC: 89 MG/DL (ref 74–99)
HCT VFR BLD AUTO: 41.4 % (ref 41–52)
HGB BLD-MCNC: 13.5 G/DL (ref 13.5–17.5)
IMM GRANULOCYTES # BLD AUTO: 0.26 X10*3/UL (ref 0–0.7)
IMM GRANULOCYTES NFR BLD AUTO: 2 % (ref 0–0.9)
LYMPHOCYTES # BLD AUTO: 0.87 X10*3/UL (ref 1.2–4.8)
LYMPHOCYTES NFR BLD AUTO: 6.7 %
MCH RBC QN AUTO: 27.7 PG (ref 26–34)
MCHC RBC AUTO-ENTMCNC: 32.6 G/DL (ref 32–36)
MCV RBC AUTO: 85 FL (ref 80–100)
MONOCYTES # BLD AUTO: 0.9 X10*3/UL (ref 0.1–1)
MONOCYTES NFR BLD AUTO: 6.9 %
NEUTROPHILS # BLD AUTO: 10.62 X10*3/UL (ref 1.2–7.7)
NEUTROPHILS NFR BLD AUTO: 81.8 %
PLATELET # BLD AUTO: 583 X10*3/UL (ref 150–450)
POTASSIUM SERPL-SCNC: 4.2 MMOL/L (ref 3.5–5.3)
PROT SERPL-MCNC: 7.1 G/DL (ref 6.4–8.2)
RBC # BLD AUTO: 4.88 X10*6/UL (ref 4.5–5.9)
SODIUM SERPL-SCNC: 143 MMOL/L (ref 136–145)
WBC # BLD AUTO: 13 X10*3/UL (ref 4.4–11.3)

## 2023-11-13 PROCEDURE — 1159F MED LIST DOCD IN RCRD: CPT | Performed by: INTERNAL MEDICINE

## 2023-11-13 PROCEDURE — 1036F TOBACCO NON-USER: CPT | Performed by: INTERNAL MEDICINE

## 2023-11-13 PROCEDURE — 36415 COLL VENOUS BLD VENIPUNCTURE: CPT

## 2023-11-13 PROCEDURE — 85025 COMPLETE CBC W/AUTO DIFF WBC: CPT

## 2023-11-13 PROCEDURE — 1126F AMNT PAIN NOTED NONE PRSNT: CPT | Performed by: INTERNAL MEDICINE

## 2023-11-13 PROCEDURE — 99214 OFFICE O/P EST MOD 30 MIN: CPT | Performed by: INTERNAL MEDICINE

## 2023-11-13 PROCEDURE — 80053 COMPREHEN METABOLIC PANEL: CPT

## 2023-11-13 RX ORDER — WARFARIN 7.5 MG/1
7.5 TABLET ORAL
COMMUNITY
End: 2023-11-16 | Stop reason: ALTCHOICE

## 2023-11-13 ASSESSMENT — ENCOUNTER SYMPTOMS
LOSS OF SENSATION IN FEET: 0
OCCASIONAL FEELINGS OF UNSTEADINESS: 0

## 2023-11-13 ASSESSMENT — PAIN SCALES - GENERAL: PAINLEVEL: 0-NO PAIN

## 2023-11-13 NOTE — PROGRESS NOTES
Patient ID: Waldo Cohen is a 72 y.o. male.  Referring Physician: No referring provider defined for this encounter.  Primary Care Provider: Christopher Meyers MD  Visit Type: Follow Up      Subjective    HPI  I had recent mitral valve repair    Review of Systems   Constitutional: Negative.    HENT:  Negative.     Eyes: Negative.    Respiratory: Negative.     Cardiovascular: Negative.    Gastrointestinal: Negative.    Endocrine: Negative.    Genitourinary: Negative.     Musculoskeletal: Negative.    Skin: Negative.    Neurological: Negative.    Hematological: Negative.    Psychiatric/Behavioral: Negative.          Objective   BSA: 2.04 meters squared  /75 (BP Location: Left arm)   Pulse 64   Temp 36.5 °C (97.7 °F) (Temporal)   Resp 16   Wt 85.5 kg (188 lb 7.9 oz)   SpO2 98%   BMI 27.84 kg/m²      has a past medical history of Body mass index (BMI) 28.0-28.9, adult, Cerebral infarction, unspecified (CMS/HCC) (10/24/2016), Gastrojejunal ulcer, unspecified as acute or chronic, without hemorrhage or perforation, Overweight, and Personal history of other diseases of the circulatory system.   has a past surgical history that includes Other surgical history (02/25/2022); Other surgical history (03/14/2022); MR angio head wo IV contrast (3/1/2022); and MR angio neck wo IV contrast (3/1/2022).  No family history on file.  Oncology History    No history exists.       Waldo Cohen  reports that he quit smoking about 39 years ago. His smoking use included cigarettes. He smoked an average of .5 packs per day. He has never used smokeless tobacco.  He  reports current alcohol use of about 1.0 standard drink of alcohol per week.  He  reports no history of drug use.    Physical Exam  Vitals reviewed.   HENT:      Head: Normocephalic.      Mouth/Throat:      Mouth: Mucous membranes are moist.   Eyes:      Extraocular Movements: Extraocular movements intact.      Pupils: Pupils are equal, round, and reactive to light.    Cardiovascular:      Rate and Rhythm: Regular rhythm.      Heart sounds: Normal heart sounds.   Pulmonary:      Breath sounds: Normal breath sounds.   Abdominal:      General: Bowel sounds are normal.      Palpations: Abdomen is soft.   Musculoskeletal:         General: Normal range of motion.      Cervical back: Neck supple.   Skin:     General: Skin is warm and dry.   Neurological:      General: No focal deficit present.      Mental Status: He is alert and oriented to person, place, and time.   Psychiatric:         Mood and Affect: Mood normal.         WBC   Date/Time Value Ref Range Status   11/13/2023 01:25 PM 13.0 (H) 4.4 - 11.3 x10*3/uL Final   08/08/2023 10:42 AM 14.2 (H) 4.4 - 11.3 x10E9/L Final   08/02/2023 05:02 AM CANCELED       Comment:     Result canceled by the ancillary.   07/31/2023 07:33 AM 13.3 (H) 4.4 - 11.3 x10E9/L Final     nRBC   Date Value Ref Range Status   08/08/2023 0.2 0.0 - 0.0 /100 WBC Final   08/02/2023 CANCELED       Comment:     Result canceled by the ancillary.   07/31/2023 0.2 0.0 - 0.0 /100 WBC Final     RBC   Date Value Ref Range Status   11/13/2023 4.88 4.50 - 5.90 x10*6/uL Final   08/08/2023 4.57 4.50 - 5.90 x10E12/L Final   08/02/2023 CANCELED       Comment:     Result canceled by the ancillary.   07/31/2023 4.90 4.50 - 5.90 x10E12/L Final     Hemoglobin   Date Value Ref Range Status   11/13/2023 13.5 13.5 - 17.5 g/dL Final   08/08/2023 12.3 (L) 13.5 - 17.5 g/dL Final   08/02/2023 CANCELED       Comment:     Result canceled by the ancillary.   07/31/2023 13.0 (L) 13.5 - 17.5 g/dL Final     Hematocrit   Date Value Ref Range Status   11/13/2023 41.4 41.0 - 52.0 % Final   08/08/2023 39.9 (L) 41.0 - 52.0 % Final   08/02/2023 CANCELED       Comment:     Result canceled by the ancillary.   07/31/2023 39.9 (L) 41.0 - 52.0 % Final     MCV   Date/Time Value Ref Range Status   11/13/2023 01:25 PM 85 80 - 100 fL Final   08/08/2023 10:42 AM 87 80 - 100 fL Final   08/02/2023 05:02 AM  "CANCELED       Comment:     Result canceled by the ancillary.   07/31/2023 07:33 AM 81 80 - 100 fL Final     MCH   Date/Time Value Ref Range Status   11/13/2023 01:25 PM 27.7 26.0 - 34.0 pg Final     MCHC   Date/Time Value Ref Range Status   11/13/2023 01:25 PM 32.6 32.0 - 36.0 g/dL Final   08/08/2023 10:42 AM 30.8 (L) 32.0 - 36.0 g/dL Final   08/02/2023 05:02 AM CANCELED       Comment:     Result canceled by the ancillary.   07/31/2023 07:33 AM 32.6 32.0 - 36.0 g/dL Final     RDW   Date/Time Value Ref Range Status   11/13/2023 01:25 PM 16.6 (H) 11.5 - 14.5 % Final   08/08/2023 10:42 AM 15.9 (H) 11.5 - 14.5 % Final   08/02/2023 05:02 AM CANCELED       Comment:     Result canceled by the ancillary.   07/31/2023 07:33 AM 15.9 (H) 11.5 - 14.5 % Final     Platelets   Date/Time Value Ref Range Status   11/13/2023 01:25  (H) 150 - 450 x10*3/uL Final   08/08/2023 10:42  (H) 150 - 450 x10E9/L Final   08/02/2023 05:02 AM CANCELED       Comment:     Result canceled by the ancillary.   07/31/2023 07:33  (H) 150 - 450 x10E9/L Final     No results found for: \"MPV\"  Neutrophils %   Date/Time Value Ref Range Status   11/13/2023 01:25 PM 81.8 40.0 - 80.0 % Final   07/20/2023 02:12 PM 80.7 40.0 - 80.0 % Final   07/01/2023 01:08 PM 86.9 40.0 - 80.0 % Final   05/08/2023 01:53 PM 80.3 40.0 - 80.0 % Final     Immature Granulocytes %, Automated   Date/Time Value Ref Range Status   11/13/2023 01:25 PM 2.0 (H) 0.0 - 0.9 % Final     Comment:     Immature Granulocyte Count (IG) includes promyelocytes, myelocytes and metamyelocytes but does not include bands. Percent differential counts (%) should be interpreted in the context of the absolute cell counts (cells/UL).   07/20/2023 02:12 PM 2.6 (H) 0.0 - 0.9 % Final     Comment:      Immature Granulocyte Count (IG) includes promyelocytes,    myelocytes and metamyelocytes but does not include bands.   Percent differential counts (%) should be interpreted in the   context of the " absolute cell counts (cells/L).     07/01/2023 01:08 PM 1.0 (H) 0.0 - 0.9 % Final     Comment:      Immature Granulocyte Count (IG) includes promyelocytes,    myelocytes and metamyelocytes but does not include bands.   Percent differential counts (%) should be interpreted in the   context of the absolute cell counts (cells/L).     05/08/2023 01:53 PM 2.3 (H) 0.0 - 0.9 % Final     Comment:      Immature Granulocyte Count (IG) includes promyelocytes,    myelocytes and metamyelocytes but does not include bands.   Percent differential counts (%) should be interpreted in the   context of the absolute cell counts (cells/L).       Lymphocytes %   Date/Time Value Ref Range Status   11/13/2023 01:25 PM 6.7 13.0 - 44.0 % Final   07/20/2023 02:12 PM 6.3 13.0 - 44.0 % Final   07/01/2023 01:08 PM 4.1 13.0 - 44.0 % Final   05/08/2023 01:53 PM 7.7 13.0 - 44.0 % Final     Monocytes %   Date/Time Value Ref Range Status   11/13/2023 01:25 PM 6.9 2.0 - 10.0 % Final   07/20/2023 02:12 PM 6.6 2.0 - 10.0 % Final   07/01/2023 01:08 PM 5.2 2.0 - 10.0 % Final   05/08/2023 01:53 PM 6.7 2.0 - 10.0 % Final     Eosinophils %   Date/Time Value Ref Range Status   11/13/2023 01:25 PM 1.7 0.0 - 6.0 % Final   07/20/2023 02:12 PM 2.3 0.0 - 6.0 % Final   07/01/2023 01:08 PM 1.5 0.0 - 6.0 % Final   05/08/2023 01:53 PM 1.6 0.0 - 6.0 % Final     Basophils %   Date/Time Value Ref Range Status   11/13/2023 01:25 PM 0.9 0.0 - 2.0 % Final   07/20/2023 02:12 PM 1.5 0.0 - 2.0 % Final   07/01/2023 01:08 PM 1.3 0.0 - 2.0 % Final   05/08/2023 01:53 PM 1.4 0.0 - 2.0 % Final     Neutrophils Absolute   Date/Time Value Ref Range Status   11/13/2023 01:25 PM 10.62 (H) 1.20 - 7.70 x10*3/uL Final     Comment:     Percent differential counts (%) should be interpreted in the context of the absolute cell counts (cells/uL).   07/20/2023 02:12 PM 9.78 (H) 1.20 - 7.70 x10E9/L Final   07/01/2023 01:08 PM 14.68 (H) 1.20 - 7.70 x10E9/L Final   05/08/2023 01:53 PM 8.90 (H) 1.20 -  "7.70 x10E9/L Final     Immature Granulocytes Absolute, Automated   Date/Time Value Ref Range Status   11/13/2023 01:25 PM 0.26 0.00 - 0.70 x10*3/uL Final     Lymphocytes Absolute   Date/Time Value Ref Range Status   11/13/2023 01:25 PM 0.87 (L) 1.20 - 4.80 x10*3/uL Final   07/20/2023 02:12 PM 0.76 (L) 1.20 - 4.80 x10E9/L Final   07/01/2023 01:08 PM 0.69 (L) 1.20 - 4.80 x10E9/L Final   05/08/2023 01:53 PM 0.85 (L) 1.20 - 4.80 x10E9/L Final     Monocytes Absolute   Date/Time Value Ref Range Status   11/13/2023 01:25 PM 0.90 0.10 - 1.00 x10*3/uL Final   07/20/2023 02:12 PM 0.80 0.10 - 1.00 x10E9/L Final   07/01/2023 01:08 PM 0.87 0.10 - 1.00 x10E9/L Final   05/08/2023 01:53 PM 0.74 0.10 - 1.00 x10E9/L Final     Eosinophils Absolute   Date/Time Value Ref Range Status   11/13/2023 01:25 PM 0.22 0.00 - 0.70 x10*3/uL Final   07/20/2023 02:12 PM 0.28 0.00 - 0.70 x10E9/L Final   07/01/2023 01:08 PM 0.25 0.00 - 0.70 x10E9/L Final   05/08/2023 01:53 PM 0.18 0.00 - 0.70 x10E9/L Final     Basophils Absolute   Date/Time Value Ref Range Status   11/13/2023 01:25 PM 0.12 (H) 0.00 - 0.10 x10*3/uL Final   07/20/2023 02:12 PM 0.18 (H) 0.00 - 0.10 x10E9/L Final   07/01/2023 01:08 PM 0.22 (H) 0.00 - 0.10 x10E9/L Final   05/08/2023 01:53 PM 0.16 (H) 0.00 - 0.10 x10E9/L Final       No components found for: \"PT\"  aPTT   Date/Time Value Ref Range Status   07/25/2023 12:03 AM 31 27 - 38 sec Final     Comment:     Note new reference range as of 6/20/2023 at 10:00am.   07/24/2023 01:15 PM CANCELED       Comment:     Note new reference range as of 6/20/2023 at 10:00am.    Result canceled by the ancillary.     07/24/2023 01:00 PM 39 (H) 27 - 38 sec Final     Comment:     Note new reference range as of 6/20/2023 at 10:00am.       Assessment/Plan    1) essential thrombocythemia  -JAK2+  -no history of thrombosis and his \"TIA\" was considered questionable  -on ASA  -plavix on hold as he currently is on warfarin  -labs to be done today include CBC + " COMP  -results reviewed--wbc 13, hgb 13.5, plt 583,000, creatinine 1.16, AST 19, ALT 22  -he says he has an upcoming followup with cardiology who will likely clear him to stop warfarin, after which he can then resume plavix  -will see him again in 6 months    2) TIA  -on asa  -on plavix    3) seizure disorder  -on keppra    4) mitral valve regurgitation  -on 7/24/2023 he underwent minimally invasive mitral valve repair with [place] near cords and 36 mm Medtronic Simuform ring  -after surgery he apparently went into atrial fibrillation and is currently on coumadin     Problem List Items Addressed This Visit    None  Visit Diagnoses         Codes    Essential thrombocythemia (CMS/HCC)    -  Primary D47.3    Relevant Orders    Clinic Appointment Request Follow Up; GREG GIRON; [facility]    Oncology Line Draw Appointment Request    CBC and Auto Differential    Comprehensive metabolic panel                 Greg Giron MD

## 2023-11-15 ENCOUNTER — CLINICAL SUPPORT (OUTPATIENT)
Dept: CARDIAC REHAB | Facility: CLINIC | Age: 73
End: 2023-11-15
Payer: MEDICARE

## 2023-11-15 ENCOUNTER — APPOINTMENT (OUTPATIENT)
Dept: CARDIAC REHAB | Facility: CLINIC | Age: 73
End: 2023-11-15
Payer: MEDICARE

## 2023-11-15 DIAGNOSIS — Z98.890 PERSONAL HISTORY OF SURGERY TO HEART AND GREAT VESSELS, PRESENTING HAZARDS TO HEALTH: ICD-10-CM

## 2023-11-15 PROCEDURE — 93798 PHYS/QHP OP CAR RHAB W/ECG: CPT | Performed by: INTERNAL MEDICINE

## 2023-11-16 ASSESSMENT — ENCOUNTER SYMPTOMS
GASTROINTESTINAL NEGATIVE: 1
CONSTITUTIONAL NEGATIVE: 1
RESPIRATORY NEGATIVE: 1
ENDOCRINE NEGATIVE: 1
HEMATOLOGIC/LYMPHATIC NEGATIVE: 1
EYES NEGATIVE: 1
MUSCULOSKELETAL NEGATIVE: 1
PSYCHIATRIC NEGATIVE: 1
NEUROLOGICAL NEGATIVE: 1
CARDIOVASCULAR NEGATIVE: 1

## 2023-11-17 ENCOUNTER — CLINICAL SUPPORT (OUTPATIENT)
Dept: CARDIAC REHAB | Facility: CLINIC | Age: 73
End: 2023-11-17
Payer: MEDICARE

## 2023-11-17 ENCOUNTER — APPOINTMENT (OUTPATIENT)
Dept: CARDIAC REHAB | Facility: CLINIC | Age: 73
End: 2023-11-17
Payer: MEDICARE

## 2023-11-17 DIAGNOSIS — Z98.890 PERSONAL HISTORY OF SURGERY TO HEART AND GREAT VESSELS, PRESENTING HAZARDS TO HEALTH: ICD-10-CM

## 2023-11-17 PROCEDURE — 93798 PHYS/QHP OP CAR RHAB W/ECG: CPT

## 2023-11-20 ENCOUNTER — APPOINTMENT (OUTPATIENT)
Dept: CARDIAC REHAB | Facility: CLINIC | Age: 73
End: 2023-11-20
Payer: MEDICARE

## 2023-11-20 ENCOUNTER — TELEPHONE (OUTPATIENT)
Dept: NEUROLOGY | Facility: CLINIC | Age: 73
End: 2023-11-20

## 2023-11-20 ENCOUNTER — OFFICE VISIT (OUTPATIENT)
Dept: CARDIOLOGY | Facility: CLINIC | Age: 73
End: 2023-11-20
Payer: MEDICARE

## 2023-11-20 VITALS
SYSTOLIC BLOOD PRESSURE: 122 MMHG | OXYGEN SATURATION: 98 % | WEIGHT: 188 LBS | HEIGHT: 69 IN | BODY MASS INDEX: 27.85 KG/M2 | DIASTOLIC BLOOD PRESSURE: 72 MMHG | HEART RATE: 80 BPM

## 2023-11-20 DIAGNOSIS — G40.909 SEIZURE DISORDER (MULTI): ICD-10-CM

## 2023-11-20 DIAGNOSIS — D47.3 ESSENTIAL THROMBOCYTOSIS (MULTI): ICD-10-CM

## 2023-11-20 DIAGNOSIS — I25.10 CORONARY ARTERY DISEASE INVOLVING NATIVE CORONARY ARTERY OF NATIVE HEART WITHOUT ANGINA PECTORIS: Primary | ICD-10-CM

## 2023-11-20 DIAGNOSIS — E78.5 DYSLIPIDEMIA: ICD-10-CM

## 2023-11-20 DIAGNOSIS — I48.0 PAROXYSMAL ATRIAL FIBRILLATION (MULTI): ICD-10-CM

## 2023-11-20 DIAGNOSIS — I35.0 NONRHEUMATIC AORTIC VALVE STENOSIS: ICD-10-CM

## 2023-11-20 PROCEDURE — 99214 OFFICE O/P EST MOD 30 MIN: CPT | Performed by: INTERNAL MEDICINE

## 2023-11-20 PROCEDURE — 1160F RVW MEDS BY RX/DR IN RCRD: CPT | Performed by: INTERNAL MEDICINE

## 2023-11-20 PROCEDURE — 1159F MED LIST DOCD IN RCRD: CPT | Performed by: INTERNAL MEDICINE

## 2023-11-20 PROCEDURE — 1036F TOBACCO NON-USER: CPT | Performed by: INTERNAL MEDICINE

## 2023-11-20 PROCEDURE — 1126F AMNT PAIN NOTED NONE PRSNT: CPT | Performed by: INTERNAL MEDICINE

## 2023-11-20 RX ORDER — ASPIRIN 81 MG/1
81 TABLET ORAL DAILY
COMMUNITY
End: 2024-03-21 | Stop reason: SDUPTHER

## 2023-11-20 ASSESSMENT — ENCOUNTER SYMPTOMS
OCCASIONAL FEELINGS OF UNSTEADINESS: 0
LOSS OF SENSATION IN FEET: 0
DEPRESSION: 0

## 2023-11-20 NOTE — PROGRESS NOTES
Chief Complaint:   Follow-up     History Of Present Illness:    Waldo Cohen is a 72 y.o. male with a h/o aortic stenosis, mitral regurgitation s/p repair, paroxysmal atrial fibrillation, essential thrombocythemia on aspirin, pulmonary hypertension, TIA, dyslipidemia, and seizure disorder here for routine follow-up     Overall doing well. No chest pain, SOB, or palpitations. Has occasional bruising from his warfarin.    He asks if he needs to stay on warfarin long-term.  He is concerned with his essential thrombocythemia that he should be on clopidogrel.     Echocardiogram 7/31/2023: EF 50 to 55% with septal motion consistent with postoperative state. S/p MV repair with annular ring. Mean gradient 6 mmHg with heart rate 69 bpm. Mild aortic stenosis.     Mitral valve repair 7/24/2023: Resuspension of cords and 36 mm Medtronic mitral annular ring     Left and right heart catheterization 7/10/2023: Mid LAD 60% stenosis. Significant V wave consistent with severe MR. Cardiac output and index 6.6 and 3.3. RVSP 65 mmHg     SPENCER 6/30/2023: EF 55 to 60%. 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 Severe MR. Moderate mitral valve prolapse. RVSP estimated at 14.6 mmHg.     48-hour Holter monitor June 2023: Predominantly sinus rhythm with 2-1 AV block and second-degree type I AV block. PVCs 1%. 2 episodes of nonsustained VT with the longest 9 beats and the fastest 127 bpm. Less than 1% PACs.     Past Medical History:  He has a past medical history of Body mass index (BMI) 28.0-28.9, adult, Cerebral infarction, unspecified (CMS/HCC) (10/24/2016), Gastrojejunal ulcer, unspecified as acute or chronic, without hemorrhage or perforation, Overweight, and Personal history of other diseases of the circulatory system.    Past Surgical History:  He has a past surgical history that includes Other surgical history (02/25/2022); Other surgical history (03/14/2022); MR angio head wo IV contrast (3/1/2022); and MR angio neck wo IV  "contrast (3/1/2022).      Social History:  He reports that he quit smoking about 39 years ago. His smoking use included cigarettes. He has a 5.00 pack-year smoking history. He has never used smokeless tobacco. He reports current alcohol use of about 1.0 standard drink of alcohol per week. He reports that he does not use drugs.    Family History:  No family history on file.     Allergies:  Demerol [meperidine]    Outpatient Medications:  Current Outpatient Medications   Medication Instructions    aspirin 81 mg, oral, Daily    atorvastatin (Lipitor) 40 mg tablet TAKE 1 TABLET BY MOUTH ONCE DAILY    brivaracetam (Briviact) 50 mg tablet tablet TAKE 1 TABLET BY MOUTH TWO TIMES A DAY    warfarin (Coumadin) 2.5 mg tablet TAKE 1 TABLET BY MOUTH ONCE DAILY       Last Recorded Vitals:  Visit Vitals  /72 (BP Location: Left arm, Patient Position: Sitting)   Pulse 80   Ht 1.753 m (5' 9\")   Wt 85.3 kg (188 lb)   SpO2 98%   BMI 27.76 kg/m²   Smoking Status Former   BSA 2.04 m²      LASTWT(3):   Wt Readings from Last 3 Encounters:   11/20/23 85.3 kg (188 lb)   11/13/23 85.5 kg (188 lb 7.9 oz)   07/07/23 85.7 kg (189 lb)       Physical Exam:  HEENT: Carotid upstrokes normal with referred bilateral heart murmur. JVP is normal.   Pulmonary: Clear to auscultation bilaterally.  Cardiovascular: S1,S2, regular. II/VI early peaking crescendo decrescendo murmur at the right upper sternal border. No rubs or gallops.   Lower extremities: Warm. 2+ distal pulses. No edema.     Last Labs:  CBC -  Recent Labs     11/13/23  1325 08/08/23  1042 08/02/23  0502   WBC 13.0* 14.2* CANCELED   HGB 13.5 12.3* CANCELED   HCT 41.4 39.9* CANCELED   * 603* CANCELED   MCV 85 87 CANCELED       CMP -  Recent Labs     11/13/23  1325 08/08/23  1042 08/01/23  0500 07/31/23  0733    140 CANCELED 138   K 4.2 5.8* CANCELED 5.1    102 CANCELED 99   CO2 31 30 CANCELED 31   ANIONGAP 12 14 CANCELED 13   BUN 24* 21 CANCELED 30*   CREATININE 1.16 " 1.17 CANCELED 1.28   EGFR 68  --   --   --    MG  --  2.07 CANCELED 2.13     Recent Labs     11/13/23  1325 08/01/23  0500 07/31/23  0733 07/24/23  1300 07/01/23  1308 05/08/23  1353   ALBUMIN 4.7 CANCELED 3.9   < > 4.6 4.6   ALKPHOS 56  --   --   --  58 56   ALT 22  --   --   --  21 12   AST 19  --   --   --  25 16   BILITOT 0.9  --   --   --  1.4* 1.1    < > = values in this interval not displayed.       LIPID PANEL -   Recent Labs     04/26/23  0815 03/01/22  1504 08/26/21  0710   CHOL 164 180 172   LDLF 120* 123* 121*   HDL 26.2* 25.0* 27.0*   TRIG 87 158* 118       Recent Labs     03/01/22  1504   *   HGBA1C 5.3           Assessment/Plan   1) CAD: 50-60 % stenosis of the LAD. Completely asymptomatic.  We will continue to observe.     2) paroxysmal atrial fibrillation: Would like him to stay on long-term oral anticoagulation or be considered for a Watchman device.  If he were to get the Watchman device he can go back to clopidogrel which would help his ET.  Referral for EP still pending.  I have put in a referral for the structural heart team to evaluate the patient for Watchman device.     3) aortic stenosis: Mild by most recent echocardiogram     4) mitral regurgitation: S/p mitral valve repair with 38 mm ring and reattachment of cords. Progressing very well. Cardiac rehab ordered.     5) essential thrombocytosis: Continue with aspirin and warfarin for now however see the discussion above regarding Watchman device.     6) dyslipidemia: Continue atorvastatin     7) follow-up: 6 months or sooner if needed       Pramod Weathers MD

## 2023-11-21 DIAGNOSIS — G40.909 SEIZURE DISORDER (MULTI): ICD-10-CM

## 2023-11-21 NOTE — TELEPHONE ENCOUNTER
Waldo gave us a call and was questioning the 30 day supply. He said he received the 90 day supply in the mail on Friday. In this scenario will he have to contact the pharmacy?     Please advise,  Thanks

## 2023-11-21 NOTE — PROGRESS NOTES
Received letter from insurance of denial for 90 day prescription for Briviact   New Rx sent for 30 days  Patient called and then sent [employer] indicating that he received his 90 day supply, no charge as he is receiving [employer] benefits by the company  Canceled 30 day script and sent a new script for 90 day

## 2023-11-22 ENCOUNTER — APPOINTMENT (OUTPATIENT)
Dept: CARDIAC REHAB | Facility: CLINIC | Age: 73
End: 2023-11-22
Payer: MEDICARE

## 2023-11-22 ENCOUNTER — CLINICAL SUPPORT (OUTPATIENT)
Dept: CARDIAC REHAB | Facility: CLINIC | Age: 73
End: 2023-11-22
Payer: MEDICARE

## 2023-11-22 DIAGNOSIS — Z98.890 PERSONAL HISTORY OF SURGERY TO HEART AND GREAT VESSELS, PRESENTING HAZARDS TO HEALTH: ICD-10-CM

## 2023-11-22 PROCEDURE — 93798 PHYS/QHP OP CAR RHAB W/ECG: CPT | Performed by: INTERNAL MEDICINE

## 2023-11-24 ENCOUNTER — APPOINTMENT (OUTPATIENT)
Dept: CARDIAC REHAB | Facility: CLINIC | Age: 73
End: 2023-11-24
Payer: MEDICARE

## 2023-11-27 ENCOUNTER — CLINICAL SUPPORT (OUTPATIENT)
Dept: CARDIAC REHAB | Facility: CLINIC | Age: 73
End: 2023-11-27
Payer: MEDICARE

## 2023-11-27 ENCOUNTER — APPOINTMENT (OUTPATIENT)
Dept: CARDIAC REHAB | Facility: CLINIC | Age: 73
End: 2023-11-27
Payer: MEDICARE

## 2023-11-27 DIAGNOSIS — Z98.890 PERSONAL HISTORY OF SURGERY TO HEART AND GREAT VESSELS, PRESENTING HAZARDS TO HEALTH: ICD-10-CM

## 2023-11-27 PROCEDURE — 93798 PHYS/QHP OP CAR RHAB W/ECG: CPT | Performed by: INTERNAL MEDICINE

## 2023-11-28 DIAGNOSIS — I48.91 ATRIAL FIBRILLATION, UNSPECIFIED TYPE (MULTI): ICD-10-CM

## 2023-11-28 RX ORDER — WARFARIN 2.5 MG/1
2.5 TABLET ORAL
Qty: 90 TABLET | Refills: 3 | Status: SHIPPED | OUTPATIENT
Start: 2023-11-28 | End: 2023-12-05 | Stop reason: SDUPTHER

## 2023-11-29 ENCOUNTER — CLINICAL SUPPORT (OUTPATIENT)
Dept: CARDIAC REHAB | Facility: CLINIC | Age: 73
End: 2023-11-29
Payer: MEDICARE

## 2023-11-29 ENCOUNTER — APPOINTMENT (OUTPATIENT)
Dept: CARDIAC REHAB | Facility: CLINIC | Age: 73
End: 2023-11-29
Payer: MEDICARE

## 2023-11-29 DIAGNOSIS — Z98.890 PERSONAL HISTORY OF SURGERY TO HEART AND GREAT VESSELS, PRESENTING HAZARDS TO HEALTH: ICD-10-CM

## 2023-11-29 PROCEDURE — 93798 PHYS/QHP OP CAR RHAB W/ECG: CPT | Performed by: INTERNAL MEDICINE

## 2023-12-01 ENCOUNTER — APPOINTMENT (OUTPATIENT)
Dept: CARDIAC REHAB | Facility: CLINIC | Age: 73
End: 2023-12-01
Payer: MEDICARE

## 2023-12-01 ENCOUNTER — CLINICAL SUPPORT (OUTPATIENT)
Dept: CARDIAC REHAB | Facility: CLINIC | Age: 73
End: 2023-12-01
Payer: MEDICARE

## 2023-12-01 DIAGNOSIS — Z98.890 PERSONAL HISTORY OF SURGERY TO HEART AND GREAT VESSELS, PRESENTING HAZARDS TO HEALTH: ICD-10-CM

## 2023-12-01 PROCEDURE — 93798 PHYS/QHP OP CAR RHAB W/ECG: CPT | Performed by: INTERNAL MEDICINE

## 2023-12-04 ENCOUNTER — APPOINTMENT (OUTPATIENT)
Dept: CARDIAC REHAB | Facility: CLINIC | Age: 73
End: 2023-12-04
Payer: MEDICARE

## 2023-12-04 ENCOUNTER — CLINICAL SUPPORT (OUTPATIENT)
Dept: CARDIAC REHAB | Facility: CLINIC | Age: 73
End: 2023-12-04
Payer: MEDICARE

## 2023-12-04 DIAGNOSIS — Z98.890 PERSONAL HISTORY OF SURGERY TO HEART AND GREAT VESSELS, PRESENTING HAZARDS TO HEALTH: ICD-10-CM

## 2023-12-04 PROCEDURE — 93798 PHYS/QHP OP CAR RHAB W/ECG: CPT | Performed by: INTERNAL MEDICINE

## 2023-12-05 ENCOUNTER — ANTICOAGULATION - WARFARIN VISIT (OUTPATIENT)
Dept: CARDIOLOGY | Facility: CLINIC | Age: 73
End: 2023-12-05
Payer: MEDICARE

## 2023-12-05 DIAGNOSIS — I48.0 PAROXYSMAL ATRIAL FIBRILLATION (MULTI): Primary | ICD-10-CM

## 2023-12-05 DIAGNOSIS — I48.91 ATRIAL FIBRILLATION, UNSPECIFIED TYPE (MULTI): ICD-10-CM

## 2023-12-05 LAB
POC INR: 3.4 (ref 2–3)
POC PROTHROMBIN TIME: ABNORMAL

## 2023-12-05 PROCEDURE — 99211 OFF/OP EST MAY X REQ PHY/QHP: CPT | Performed by: INTERNAL MEDICINE

## 2023-12-05 PROCEDURE — 85610 PROTHROMBIN TIME: CPT | Mod: QW | Performed by: INTERNAL MEDICINE

## 2023-12-05 RX ORDER — WARFARIN 2.5 MG/1
TABLET ORAL
Qty: 270 TABLET | Refills: 3 | Status: SHIPPED | OUTPATIENT
Start: 2023-12-05 | End: 2024-02-27 | Stop reason: HOSPADM

## 2023-12-05 NOTE — PROGRESS NOTES
Patient identification verified with 2 identifiers.    Location: [facility] - [address] [place] [id number] [phone]     Referring Physician: Dr. Pramod Weathers  Enrollment/ Re-enrollment date: 8/3/2024   INR Goal: 2.0-3.0  INR monitoring is per [facility] protocol.  Anticoagulation Medication: warfarin  Indication: atrial fibrillation    Subjective   Bleeding signs/symptoms: No.  Pt states that he has prolonged bleeding from a dog scratch.    Bruising: No.  Pt noted that he has some increased bruising on his arms.  Major bleeding event: No  Thrombosis signs/symptoms: No  Thromboembolic event: No  Missed doses: Yes.  Pt missed 1 dose of warfarin a week ago.  Extra doses: No  Medication changes: No  Dietary changes: No  Change in health: No  Change in activity: No  Alcohol: No  Other concerns: No    Upcoming Surgeries:  Does the Patient Have any upcoming surgeries that require interruption in anticoagulation therapy? no  Does the patient require bridging? no    Dose maintained after last visit, 10/23/23.  Pt is taking 52.5mg of warfarin weekly.  Anticoagulation Summary  As of 12/5/2023      INR goal:  2.0-3.0   TTR:  63.9 % (2.2 mo)   INR used for dosing:  3.40 (12/5/2023)   Weekly warfarin total:  52.5 mg               Assessment/Plan   Supratherapeutic     1. New dose: no change, 52.5mg weekly but will have Pt take a One time dose reduction of 2.5mg on 12/5/23.   2. Next INR: 1 week.  Pt will RTC in 2 weeks for repeat INR.  Pt can be r/s in 4 weeks if therapeutic next visit.  3. Pt had appt. With Dr. Weathers on 11/20.  He was inquiring about stopping warfarin.  Pt will see Dr. Bray in [place]. To be evaluated for Watchman.      Education provided to patient during the visit:  Patient instructed to call in interim with questions, concerns and changes.   Patient educated on interactions between medications and warfarin.   Patient educated on dietary consistency in vitamin k consumption.    Patient educated on affects of alcohol consumption while taking warfarin.   Patient educated on signs of bleeding/clotting.   Patient educated on compliance with dosing, follow up appointments, and prescribed plan of care.

## 2023-12-06 ENCOUNTER — CLINICAL SUPPORT (OUTPATIENT)
Dept: CARDIAC REHAB | Facility: CLINIC | Age: 73
End: 2023-12-06
Payer: MEDICARE

## 2023-12-06 ENCOUNTER — APPOINTMENT (OUTPATIENT)
Dept: CARDIAC REHAB | Facility: CLINIC | Age: 73
End: 2023-12-06
Payer: MEDICARE

## 2023-12-06 DIAGNOSIS — Z98.890 PERSONAL HISTORY OF SURGERY TO HEART AND GREAT VESSELS, PRESENTING HAZARDS TO HEALTH: ICD-10-CM

## 2023-12-06 PROCEDURE — 93798 PHYS/QHP OP CAR RHAB W/ECG: CPT | Performed by: INTERNAL MEDICINE

## 2023-12-08 ENCOUNTER — CLINICAL SUPPORT (OUTPATIENT)
Dept: CARDIAC REHAB | Facility: CLINIC | Age: 73
End: 2023-12-08
Payer: MEDICARE

## 2023-12-08 ENCOUNTER — APPOINTMENT (OUTPATIENT)
Dept: CARDIAC REHAB | Facility: CLINIC | Age: 73
End: 2023-12-08
Payer: MEDICARE

## 2023-12-08 DIAGNOSIS — Z98.890 PERSONAL HISTORY OF SURGERY TO HEART AND GREAT VESSELS, PRESENTING HAZARDS TO HEALTH: ICD-10-CM

## 2023-12-08 PROCEDURE — 93798 PHYS/QHP OP CAR RHAB W/ECG: CPT | Performed by: INTERNAL MEDICINE

## 2023-12-11 ENCOUNTER — APPOINTMENT (OUTPATIENT)
Dept: CARDIAC REHAB | Facility: CLINIC | Age: 73
End: 2023-12-11
Payer: MEDICARE

## 2023-12-11 ENCOUNTER — CLINICAL SUPPORT (OUTPATIENT)
Dept: CARDIAC REHAB | Facility: CLINIC | Age: 73
End: 2023-12-11
Payer: MEDICARE

## 2023-12-11 DIAGNOSIS — Z98.890 PERSONAL HISTORY OF SURGERY TO HEART AND GREAT VESSELS, PRESENTING HAZARDS TO HEALTH: ICD-10-CM

## 2023-12-11 PROCEDURE — G0239 OTH RESP PROC, GROUP: HCPCS | Performed by: INTERNAL MEDICINE

## 2023-12-13 ENCOUNTER — APPOINTMENT (OUTPATIENT)
Dept: CARDIAC REHAB | Facility: CLINIC | Age: 73
End: 2023-12-13
Payer: MEDICARE

## 2023-12-15 ENCOUNTER — APPOINTMENT (OUTPATIENT)
Dept: CARDIAC REHAB | Facility: CLINIC | Age: 73
End: 2023-12-15
Payer: MEDICARE

## 2023-12-15 NOTE — PROGRESS NOTES
INDIVIDUAL CARDIAC TREATMENT PLAN- 90 DAY REASSESSMENT     Name: Waldo Cohen   Today's Date: 12/15/23   : 1950    Primary Provider: Christopher Meyers  MRN: [id number]    Referring Physician: Pramod Weathers      Diagnosis: Mitral Valve Repair  Onset Date: 23      Risk Stratification: High      NUTRITION REASSESSMENT  Lipids  Lipid Lab Date: 23  Total Chol:164  HDL:26.2  LDL:120  Tri  Cholesterol Med: Atorvastatin     Diabetes: No  HbA1c: 5.3  Date Checked: 3/01/22  Monitors glucose at home: No  Fasting Blood Sugar Range: NA  Frequency:NA  Hypoglycemic Episode: NA    Weight Management  Weight: 186  Height: 68 IN.  BMI:  28.03  Body Composition: 27.6  Waist Circumference: 41  Current Diet: Heart Healthy  Barriers to dietary change:    Initial Dietary Assessment Score: 71/96      NUTRITION PLAN  Nutrition Goals:   1. Improve Picture Your Plate assessment results by discharge. Reviewed dietary assessment tip sheets with pt. Pt participated in the nutritional lecture series with RD.  2. Make changes to diet to include heart healthy options while in the program.  Encouraged attendance to dietician lectures. Pt working towards goals.    Nutrition Intervention/Education:   *Sent dietician Picture Your Plate assessment for scoring and recommendations.   *Perform weekly weight checks on .   *Body Composition completed by Exercise Physiologist.  Education Provided: Registered Dietician Lectures: Healthier Eating Out & Snacking, Heart Healthy Shopping, Cooking & Portion Distortion, Focus for Heart Healthy Eating, Sodium: Shaking the Habit, Current Topics in Nutrition      OTHER CORE COMPONENTS/ RISK FACTORS ASSESSMENT  Medication compliance: good compliance  Using pill box: Yes  Carries medication list: No    Blood Pressure Management:  History of High BP: Yes  Resting BP: 108/70    Tobacco: FORMER  Quit Date:   Other forms of tobacco: NA  Anyone in the house smoke: No    Initial Knowledge Test  Score: 14    OTHER CORE COMPONENTS/ RISK FACTOR PLAN   Other Core components/Risk Factor Goals:                                                                                                                                                      1. Achieve and maintain a resting blood pressure less than 130/80 while in the program. Meeting goal thus far in the program.  2. Gain knowledge of cardiac disease and lifestyle modifications related to exercise and ADL's prior to discharge. Pt actively participates in weekly education lectures. Pt progressing as expected.    Other Components/ Risk Factors Intervention/Education:  *Will continue to monitor HR, BP, dyspnea and arrhythmias each session.   *Will meet with patient to discuss goals & progress.  *Encouraged review of education materials.   Education Provided: Peripheral Artery Disease, Living with HF and Hands Only CPR, Managing Cholesterol, Individual Cholesterol Levels, HTN      PSYCHOSOCIAL ASSESSMENT  Patient reported stress level: mild  Using stress management skills: Yes  HX of anxiety: No  HX of depression: Yes  Patient questioned regarding any new stress, depression and anxiety symptoms: Yes    Family/Support System: Wife, family, friends, S[employer]ism  Seeing mental health provider: No  Psychosocial medications: NA    Initial PHQ-9 score: 0  Was PHQ-9 faxed to provider: No  Date faxed: NA    Quality of Life Survey:   PCS: 52.87  MCS:36.59    Stages of change:  Preparation    PSYCHOSOCIAL PLAN  Psychosocial Goals:  1. Increase/maintain PCS and MCS scores by discharge. Pt participated in education lectures. Waldo has a very supportive family and travels often to visit them   2. Decrease/maintain PHQ-9 category classification while in the program. Encouraged review of emotional and stress aspects educational handouts with family members. Pt considering leaving his current job and retiring.     Psychosocial Interventions/ Education:  * Provided one on one emotional  support  *Will facilitate peer support within the context of other phase II patients while in the program.   Education Provided: Stress Series Parts 1-3: The Good, Bad & Ugly, Short-Circuiting Stress, Mastering Stress    EXERCISE ASSESSMENT  Home Exercise: Yes  Frequency: 4-5 fays/wk  Mode: Bike    EXERCISE PLAN  Exercise Goals:   1. Goal of 5.8 METs by discharge. Progressing as expected.   2. Have a plan in place for continued exercise after the program by discharge.    Exercise Prescription:   Based on 12 Minute Walk Test  Frequency: 3 days per week  Duration (total aerobic min.): 30 minutes  Intensity RPE: 11-14  Target HR:103-136  MET Level Range: 1.7-5.8    Date of first exercise session:     Modality METS Load  Duration   1 Warm Up    05:00   2 Treadmill 5.3 3@6%  06:00   3 NuStep 3.6 [address] 7 06:00   4 Weights  80Load  06:00   5 Rowing Machine 5.3 45Watts  06:00   6 Recumbent Bike 4 56Watts 3 06:00   7 Cool Down     05:00     Exercise Intervention/Education:   *Aim to progress 1 MET every 6 Weeks or as tolerated. Progressing as expected.  *Incorporate resistance training for muscular endurance and strength. Pt began resistance training and progressing well.  Education Provided: Benefits of Exercise, Enjoying Exercise, Home Exercise, Diabetes and Exercise, Resistance Training Parts 1-2, Flexibility    LEARNING ASSESSMENT & BARRIERS  Readiness to Learn: NA  Barriers: None  Comments: NA    FALL RISK  high  Comments: NA    INDIVIDUAL PATIENT GOALS:  Improve fitness and stamina by discharge.  2. Lose 5 pounds by discharge.    MEDICATIONS  Current Outpatient Medications   Medication Instructions    aspirin 81 mg, oral, Daily    atorvastatin (Lipitor) 40 mg tablet TAKE 1 TABLET BY MOUTH ONCE DAILY    brivaracetam (Briviact) 50 mg tablet tablet TAKE 1 TABLET BY MOUTH TWO TIMES A DAY    warfarin (Coumadin) 2.5 mg tablet Take 3 tablets daily or as directed by  coumadin clinic        STAFF COMMENTS: Patient has  attended 24/36 sessions of cardiac rehab after MVR on 7/24/23. Tolerating prescribed workloads well without reports of dyspnea, angina or acute pain that does not resolve with rest. Stable cardiovascular response to exercise. Telemetry shows SR-ST with PVCS, bigeminy. Met with patient one on one to discuss goals and progress in the program. Doing well in CR and enjoys coming to class. Pt is traveling for vacation at the end of the month, so will adjust workloads as tolerated on his return. Thank you for allowing us to participate in the care of your patient.

## 2023-12-18 ENCOUNTER — ANTICOAGULATION - WARFARIN VISIT (OUTPATIENT)
Dept: CARDIOLOGY | Facility: CLINIC | Age: 73
End: 2023-12-18
Payer: MEDICARE

## 2023-12-18 ENCOUNTER — CLINICAL SUPPORT (OUTPATIENT)
Dept: CARDIAC REHAB | Facility: CLINIC | Age: 73
End: 2023-12-18
Payer: MEDICARE

## 2023-12-18 DIAGNOSIS — Z98.890 PERSONAL HISTORY OF SURGERY TO HEART AND GREAT VESSELS, PRESENTING HAZARDS TO HEALTH: ICD-10-CM

## 2023-12-18 DIAGNOSIS — I48.0 PAROXYSMAL ATRIAL FIBRILLATION (MULTI): Primary | ICD-10-CM

## 2023-12-18 LAB
POC INR: 2.5
POC PROTHROMBIN TIME: NORMAL

## 2023-12-18 PROCEDURE — 99211 OFF/OP EST MAY X REQ PHY/QHP: CPT | Mod: PO | Performed by: INTERNAL MEDICINE

## 2023-12-18 PROCEDURE — 93798 PHYS/QHP OP CAR RHAB W/ECG: CPT | Performed by: INTERNAL MEDICINE

## 2023-12-18 PROCEDURE — 85610 PROTHROMBIN TIME: CPT | Mod: QW

## 2023-12-18 NOTE — PROGRESS NOTES
Patient identification verified with 2 identifiers.    [place] [facility] [place] [address] [place] [id number] [phone]     Referring Physician: Dr. Pramod Weathers  Enrollment/ Re-enrollment date: 8/3/2024   INR Goal: 2.0-3.0  INR monitoring is per [facility] protocol.  Anticoagulation Medication: warfarin  Indication: atrial fibrillation    Subjective   Bleeding signs/symptoms: No  Bruising: No   Major bleeding event: No  Thrombosis signs/symptoms: No  Thromboembolic event: No  Missed doses: No  Extra doses: No  Medication changes: No  Dietary changes: No  Change in health: No  Change in activity: No  Alcohol: No  Other concerns: No    Upcoming Surgeries:  Does the Patient Have any upcoming surgeries that require interruption in anticoagulation therapy? no  Does the patient require bridging? no      Anticoagulation Summary  As of 2023      INR goal:  2.0-3.0   TTR:  62.5 % (2.7 mo)   INR used for dosin.50 (2023)   Weekly warfarin total:  52.5 mg               Assessment/Plan   Therapeutic     1. New dose: no change    2. Next INR: 1 month      Education provided to patient during the visit:  Patient instructed to call in interim with questions, concerns and changes.   Patient educated on interactions between medications and warfarin.   Patient educated on dietary consistency in vitamin k consumption.   Patient educated on affects of alcohol consumption while taking warfarin.   Patient educated on signs of bleeding/clotting.   Patient educated on compliance with dosing, follow up appointments, and prescribed plan of care.

## 2023-12-19 ENCOUNTER — APPOINTMENT (OUTPATIENT)
Dept: CARDIOLOGY | Facility: CLINIC | Age: 73
End: 2023-12-19
Payer: MEDICARE

## 2023-12-20 ENCOUNTER — CLINICAL SUPPORT (OUTPATIENT)
Dept: CARDIAC REHAB | Facility: CLINIC | Age: 73
End: 2023-12-20
Payer: MEDICARE

## 2023-12-20 DIAGNOSIS — Z98.890 PERSONAL HISTORY OF SURGERY TO HEART AND GREAT VESSELS, PRESENTING HAZARDS TO HEALTH: ICD-10-CM

## 2023-12-20 PROCEDURE — 93798 PHYS/QHP OP CAR RHAB W/ECG: CPT | Performed by: INTERNAL MEDICINE

## 2023-12-22 ENCOUNTER — CLINICAL SUPPORT (OUTPATIENT)
Dept: CARDIAC REHAB | Facility: CLINIC | Age: 73
End: 2023-12-22
Payer: MEDICARE

## 2023-12-22 DIAGNOSIS — Z98.890 PERSONAL HISTORY OF SURGERY TO HEART AND GREAT VESSELS, PRESENTING HAZARDS TO HEALTH: ICD-10-CM

## 2023-12-27 ENCOUNTER — APPOINTMENT (OUTPATIENT)
Dept: CARDIAC REHAB | Facility: CLINIC | Age: 73
End: 2023-12-27
Payer: MEDICARE

## 2023-12-29 ENCOUNTER — APPOINTMENT (OUTPATIENT)
Dept: CARDIAC REHAB | Facility: CLINIC | Age: 73
End: 2023-12-29
Payer: MEDICARE

## 2024-01-03 ENCOUNTER — CLINICAL SUPPORT (OUTPATIENT)
Dept: CARDIAC REHAB | Facility: CLINIC | Age: 74
End: 2024-01-03
Payer: MEDICARE

## 2024-01-03 DIAGNOSIS — Z98.890 PERSONAL HISTORY OF SURGERY TO HEART AND GREAT VESSELS, PRESENTING HAZARDS TO HEALTH: ICD-10-CM

## 2024-01-03 PROCEDURE — 93798 PHYS/QHP OP CAR RHAB W/ECG: CPT | Performed by: INTERNAL MEDICINE

## 2024-01-04 ENCOUNTER — OFFICE VISIT (OUTPATIENT)
Dept: NEUROLOGY | Facility: CLINIC | Age: 74
End: 2024-01-04
Payer: MEDICARE

## 2024-01-04 VITALS
WEIGHT: 190 LBS | TEMPERATURE: 97.5 F | DIASTOLIC BLOOD PRESSURE: 88 MMHG | BODY MASS INDEX: 28.14 KG/M2 | SYSTOLIC BLOOD PRESSURE: 130 MMHG | RESPIRATION RATE: 16 BRPM | HEIGHT: 69 IN | HEART RATE: 77 BPM

## 2024-01-04 DIAGNOSIS — G40.909 SEIZURE DISORDER (MULTI): Primary | ICD-10-CM

## 2024-01-04 PROCEDURE — 99213 OFFICE O/P EST LOW 20 MIN: CPT | Performed by: STUDENT IN AN ORGANIZED HEALTH CARE EDUCATION/TRAINING PROGRAM

## 2024-01-04 PROCEDURE — 1160F RVW MEDS BY RX/DR IN RCRD: CPT | Performed by: STUDENT IN AN ORGANIZED HEALTH CARE EDUCATION/TRAINING PROGRAM

## 2024-01-04 PROCEDURE — 1159F MED LIST DOCD IN RCRD: CPT | Performed by: STUDENT IN AN ORGANIZED HEALTH CARE EDUCATION/TRAINING PROGRAM

## 2024-01-04 PROCEDURE — 1126F AMNT PAIN NOTED NONE PRSNT: CPT | Performed by: STUDENT IN AN ORGANIZED HEALTH CARE EDUCATION/TRAINING PROGRAM

## 2024-01-04 PROCEDURE — 1036F TOBACCO NON-USER: CPT | Performed by: STUDENT IN AN ORGANIZED HEALTH CARE EDUCATION/TRAINING PROGRAM

## 2024-01-04 ASSESSMENT — VISUAL ACUITY: VA_NORMAL: 1

## 2024-01-04 NOTE — PROGRESS NOTES
Subjective     Waldo Cohen is a  73 y.o. year old male who presents for follow up of epilepsy.     Visit type: follow up visit    HPI   73 year old male presenting for follow up of epilepsy. Currently on Briviact 50mg BID. Did have mitral valve repair with 1-week long hospitalization in 7/2023, uneventful with no seizure events inpatient. Currently on warfarin, with plan to see EP next week for consideration of watchman device to get back on Plavix for his ET.     Does state he had a seizure episode in Spring and mid December 2023 both occurring when he was preparing at the airport to board the airplane to fly to [place] to visit his daughter and grandkids. Described as nausea, R arm numbness and tingling, occurring for about 15 seconds. No post-ictal confusion. Has significant amount of stress associated with flying. Otherwise no further seizure episodes.    Review of Systems   All other systems reviewed and are negative.    All other systems have been reviewed and are negative for complaint.     Past Medical History:   Diagnosis Date    Body mass index (BMI) 28.0-28.9, adult     BMI 28.0-28.9,adult    Cerebral infarction, unspecified (CMS/HCC) 10/24/2016    Cryptogenic stroke    Gastrojejunal ulcer, unspecified as acute or chronic, without hemorrhage or perforation     Sepsis-related gastrointestinal ulceration    Overweight     Overweight (BMI 25.0-29.9)    Personal history of other diseases of the circulatory system     History of coronary atherosclerosis     No family history on file.  Past Surgical History:   Procedure Laterality Date    MR HEAD ANGIO WO IV CONTRAST  3/1/2022    MR HEAD ANGIO WO IV CONTRAST 3/1/2022 [facility]    MR NECK ANGIO WO IV CONTRAST  3/1/2022    MR NECK ANGIO WO IV CONTRAST 3/1/2022 [facility]    OTHER SURGICAL HISTORY  02/25/2022    Finger amputation    OTHER SURGICAL HISTORY  03/14/2022    Colonoscopy      Social History     Tobacco Use    Smoking status: Former      Packs/day: 0.50     Years: 10.00     Additional pack years: 0.00     Total pack years: 5.00     Types: Cigarettes     Quit date:      Years since quittin.0    Smokeless tobacco: Never   Substance Use Topics    Alcohol use: Yes     Alcohol/week: 1.0 standard drink of alcohol     Types: 1 Shots of liquor per week          Objective     Neurological Exam  Mental Status  Alert. Oriented to person, place, time and situation. Oriented to person, place, and time. Speech is normal.    Cranial Nerves  CN II: Visual acuity is normal. Visual fields full to confrontation.  CN III, IV, VI: Extraocular movements intact bilaterally. Normal lids and orbits bilaterally. Pupils equal round and reactive to light bilaterally.  CN V: Facial sensation is normal.  CN VII: Full and symmetric facial movement.  CN VIII: Hearing is normal.  CN IX, X: Palate elevates symmetrically. Normal gag reflex.  CN XI: Shoulder shrug strength is normal.  CN XII: Tongue midline without atrophy or fasciculations.      Physical Exam  Eyes:      General: Lids are normal.      Extraocular Movements: Extraocular movements intact.      Pupils: Pupils are equal, round, and reactive to light.   Cardiovascular:      Rate and Rhythm: Normal rate and regular rhythm.   Pulmonary:      Effort: Pulmonary effort is normal.   Neurological:      General: No focal deficit present.      Mental Status: He is alert and oriented to person, place, and time. Mental status is at baseline.   Psychiatric:         Speech: Speech normal.                Assessment/Plan     Epilepsy  -Continue Briviact 50mg BID. Controlled substance agreement signed. UDS ordered.    Follow up in 1 year    Davion Cruz DO  Internal Medicine PGY-3

## 2024-01-05 ENCOUNTER — CLINICAL SUPPORT (OUTPATIENT)
Dept: CARDIAC REHAB | Facility: CLINIC | Age: 74
End: 2024-01-05
Payer: MEDICARE

## 2024-01-05 DIAGNOSIS — Z98.890 PERSONAL HISTORY OF SURGERY TO HEART AND GREAT VESSELS, PRESENTING HAZARDS TO HEALTH: ICD-10-CM

## 2024-01-05 PROCEDURE — 93798 PHYS/QHP OP CAR RHAB W/ECG: CPT | Performed by: INTERNAL MEDICINE

## 2024-01-08 ENCOUNTER — CLINICAL SUPPORT (OUTPATIENT)
Dept: CARDIAC REHAB | Facility: CLINIC | Age: 74
End: 2024-01-08
Payer: MEDICARE

## 2024-01-08 DIAGNOSIS — Z98.890 PERSONAL HISTORY OF SURGERY TO HEART AND GREAT VESSELS, PRESENTING HAZARDS TO HEALTH: ICD-10-CM

## 2024-01-08 PROCEDURE — 93798 PHYS/QHP OP CAR RHAB W/ECG: CPT | Performed by: INTERNAL MEDICINE

## 2024-01-09 NOTE — PROGRESS NOTES
PCP: Dr. Spence  Cardio: Dr. Weathers    I was asked by Dr. Weathers to evaluate this patient in consultation for evaluation of left atrial appendage closure.    The patient is a 72-year-old male with mild aortic stenosis, history of severe mitral regurgitation s/p Mvr with a 36 mm ring 7/2023,  hyperlipidemia, seizure disorder, history of TIA, pulmonary hypertension and paroxysmal atrial fibrillation as well as essential thrombocythemia. The patient has low normal LVEF based on transthoracic echocardiogram performed July 2023.  This study again demonstrating successful mitral valve repair with mean gradient of 6 mmHg and no evidence of mitral regurgitation.  Mild aortic valve stenosis was seen.    The patient has history of thrombocythemia which puts him at high risk of both thrombotic and bleeding events.  The patient has indication for lifelong antiplatelet therapy.  The patient was treated with DAPT by his hematologist.  Subsequently, with diagnosis of atrial fibrillation patient has been started on warfarin and his clopidogrel has been stopped.  The patient had extensive discussion with his care team regarding risk of long-term bleeding with concomitant use of anticoagulation and antiplatelet therapy.  The patient is referred to our clinic for consultation regarding left atrial appendage closure, so he can come off anticoagulation.    In addition, patient also has history of seizure disorder and is concerned of having a trauma related to seizure with use of anticoagulation.      Given the patient's significant thrombocythemia and seizure disorder,  the patient is referred for consideration of left atrial appendage closure for stroke risk reduction.       ROS:  Constitutional: not feeling tired, not feeling poorly, no fever and no chills.   Eyes: no eyesight problems, no blurred vision, no diplopia, no eye pain, no purulent discharge from the eyes, eyes not red, no dryness of the eyes and no itching of the eyes.    ENT: no nosebleeds, no hearing loss, no tinnitus, no earache, no sore throat, no hoarseness, no swollen glands in the neck and no nasal discharge.   Cardiovascular: no intermittent leg claudication, no chest pain, no tightness or heavy pressure, no shortness of breath, no palpitations, no lower extremity edema, the heart rate was not slow, the heart rate was not fast and as noted in HPI.   Respiratory: no chronic cough, not coughing up sputum,  no wheezing that is consistent with asthma, no asthma, no orthopnea and no postural nocturnal dyspnea.   Gastrointestinal: no change in bowel habits, no blood in stools, no diarrhea, no constipation, no nausea, no vomiting, no abdominal pain, no signs and symptoms of ulcer disease, no garret colored stools and no intolerance to fatty foods.   Genitourinary: no hematuria,  no urinary frequency, no dysuria, no incontinence, no burning sensation during urination, no urinary hesitancy, no nocturia, no genital lesion, no testicular pain, urinary stream is not smaller and urinary stream does not start and stop.   Musculoskeletal: no arthralgias, no myalgias, no joint swelling, no joint stiffness, no muscle weakness, no back pain, no limb pain, no limb swelling and no difficulty walking.   Skin: no skin rashes, no change in skin color and pigmentation, no skin lesions and no skin lumps.   Neurological: no seizures, no frequent falls, no headaches, no dizziness, no tingling, no fainting and no limb weakness.   Psychiatric: no depression, not suicidal, no confusion, no memory lapses or loss, no anxiety, no personality change and no emotional problems.   Endocrine: no goiter, no thyroid disorder, no diabetes mellitus, no excessive thirst, no dry skin, no cold intolerance, no heat intolerance, no erectile dysfunction, no increased urinary frequency, no proptosis and no deepening of the voice.   Hematologic/Lymphatic: no bleeding issues.   All other systems have been reviewed and are  negative for complaint.     Physical Exam:     Visit Vitals  Smoking Status Former        Constitutional: alert and in no acute distress.   Eyes: no erythema, swelling or discharge from the eye .   Ears, Nose, Mouth, and Throat: external inspection of ears and nose is normal , lips, teeth, and gums are normal with good dentition  and oropharynx normal with no erythema, edema, exudate or lesions .   Neck: neck is supple, symmetric, trachea midline, no masses  and no thyromegaly .   Pulmonary: no increased work of breathing or signs of respiratory distress , lungs clear to auscultation. , normal percussion of chest  and chest palpation normal .   Cardiovascular: RRR, no murmur,  no leg edema  Abdomen: abdomen non-tender, no masses  and no hepatomegaly .           Skin:  no skin lesions          Neurologic: non-focal neurologic examination.      Psychiatric judgment and insight is normal , oriented to person, place and time , normal mood and affect .       Labs:    Results for orders placed or performed in visit on 12/18/23   POCT INR manually resulted   Result Value Ref Range    POC INR 2.50     POC Prothrombin Time            Medications:    Current Outpatient Medications   Medication Instructions    aspirin 81 mg, oral, Daily    atorvastatin (Lipitor) 40 mg tablet TAKE 1 TABLET BY MOUTH ONCE DAILY    brivaracetam (Briviact) 50 mg tablet tablet TAKE 1 TABLET BY MOUTH TWO TIMES A DAY    warfarin (Coumadin) 2.5 mg tablet Take 3 tablets daily or as directed by  coumadin clinic          Assessment:      This is a 72-year-old male with history of mitral valve repair, mild aortic stenosis, hyperlipidemia and thrombocythemia and thus increased bleeding risk.  In addition, patient with history of seizure disorder and is at increased risk of catastrophic event if he has a seizure and associated trauma while being on anticoagulation.  Therefore, we will agree with his care team that he is a poor candidate for long-term  anticoagulation.    The CHADS-VASC score is 2 and HAS-BLED score is 3. The patient is at increased risk of both bleeding and stroke.  As such the patient is a reasonable candidate for consideration of left atrial appendage occluder placement.    Today we discussed the left atrial appendage closure procedure. The patient was given written educational handout materials and watched an educational video. All risks, benefits and alternative were discussed.     The risks discussed included but were not limited to vascular complications, sedation related complications, risk of MI, CVA, device embolization, pericardial tamponade and death. The patient verbalized understanding and decided to proceed.        Plan will be for preprocedural cardiac CT. Following device implant, strategy will be for dual antiplatelet therapy with aspirin and clopidogrel for 6 months then aspirin for life.     Thank you, Dr. Weathers, for this consultation and for allowing me to participate in the care of this patient.

## 2024-01-10 ENCOUNTER — CLINICAL SUPPORT (OUTPATIENT)
Dept: CARDIAC REHAB | Facility: CLINIC | Age: 74
End: 2024-01-10
Payer: MEDICARE

## 2024-01-10 DIAGNOSIS — Z98.890 PERSONAL HISTORY OF SURGERY TO HEART AND GREAT VESSELS, PRESENTING HAZARDS TO HEALTH: ICD-10-CM

## 2024-01-10 PROCEDURE — 93798 PHYS/QHP OP CAR RHAB W/ECG: CPT | Performed by: INTERNAL MEDICINE

## 2024-01-10 NOTE — PROGRESS NOTES
INDIVIDUAL CARDIAC TREATMENT PLAN- 120 DAY REASSESSMENT     Name: Waldo Cohen   Today's Date: 01/10/24   : 1950    Primary Provider: Christopher Meyers  MRN: [id number]    Referring Physician: Pramod Weathesr      Diagnosis: Mitral Valve Repair  Onset Date: 23      Risk Stratification: High      NUTRITION REASSESSMENT  Lipids  Lipid Lab Date: 23  Total Chol:164  HDL:26.2  LDL:120  Tri  Cholesterol Med: Atorvastatin     Diabetes: No  HbA1c: 5.3  Date Checked: 3/01/22  Monitors glucose at home: No  Fasting Blood Sugar Range: NA  Frequency:NA  Hypoglycemic Episode: NA    Weight Management  Weight: 190  Height: 68 IN.  BMI:  28.03  Body Composition: 27.6  Waist Circumference: 41  Current Diet: Heart Healthy  Barriers to dietary change:    Initial Dietary Assessment Score: 71/96      NUTRITION PLAN  Nutrition Goals:   1. Improve Picture Your Plate assessment results by discharge. Reviewed dietary assessment tip sheets with pt. Pt participated in the nutritional lecture series with RD. Will readminister test prior to discharge.   2. Make changes to diet to include heart healthy options while in the program.  Encouraged attendance to dietician lectures. Pt working towards goals and doing very well.    Nutrition Intervention/Education:   *Sent dietician Picture Your Plate assessment for scoring and recommendations.   *Perform weekly weight checks on .   *Body Composition completed by Exercise Physiologist.  Education Provided: Registered Dietician Lectures: Healthier Eating Out & Snacking, Heart Healthy Shopping, Cooking & Portion Distortion, Focus for Heart Healthy Eating, Sodium: Shaking the Habit, Current Topics in Nutrition      OTHER CORE COMPONENTS/ RISK FACTORS ASSESSMENT  Medication compliance: good compliance  Using pill box: Yes  Carries medication list: Yes    Blood Pressure Management:  History of High BP: Yes  Resting BP: 108/70    Tobacco: FORMER  Quit Date:   Other forms of  tobacco: NA  Anyone in the house smoke: No    Initial Knowledge Test Score: 14    OTHER CORE COMPONENTS/ RISK FACTOR PLAN   Other Core components/Risk Factor Goals:                                                                                                                                                      1. Achieve and maintain a resting blood pressure less than 130/80 while in the program. Meeting goal thus far in the program.  2. Gain knowledge of cardiac disease and lifestyle modifications related to exercise and ADL's prior to discharge. Pt actively participates in weekly education lectures. Pt progressing better than expected.    Other Components/ Risk Factors Intervention/Education:  *Will continue to monitor HR, BP, dyspnea and arrhythmias each session.   *Will meet with patient to discuss goals & progress.  *Encouraged review of education materials.   Education Provided: Peripheral Artery Disease, Living with HF and Hands Only CPR, Managing Cholesterol, Individual Cholesterol Levels, HTN, Angina and NTG Use, Medication Overview and Safety, Intimacy and Heart Disease, PAD, Hands Only CPR      PSYCHOSOCIAL ASSESSMENT  Patient reported stress level: mild  Using stress management skills: Yes  HX of anxiety: No  HX of depression: Yes  Patient questioned regarding any new stress, depression and anxiety symptoms: Yes    Family/Support System: Wife, family, friends, [employer]  Seeing mental health provider: No  Psychosocial medications: NA    Initial PHQ-9 score: 0  Was PHQ-9 faxed to provider: No  Date faxed: NA    Quality of Life Survey:   PCS: 52.87  MCS:36.59    Stages of change:  Preparation    PSYCHOSOCIAL PLAN  Psychosocial Goals:  1. Increase/maintain PCS and MCS scores by discharge. Pt participated in education lectures. Waldo has a very supportive family and travels often to visit them. Will readminister PHQ-9 prior to discharge.    2. Decrease/maintain PHQ-9 category classification while in the  program. Encouraged review of emotional and stress aspects educational handouts with family members. Pt considering leaving his current job and retiring.     Psychosocial Interventions/ Education:  * Provided one on one emotional support  *Will facilitate peer support within the context of other phase II patients while in the program.   Education Provided: Stress Series Parts 1-3: The Good, Bad & Ugly, Short-Circuiting Stress, Mastering Stress    EXERCISE ASSESSMENT  Home Exercise: Yes  Frequency: 4-5 fays/wk  Mode: Bike    EXERCISE PLAN  Exercise Goals:   1. Goal of 5.8 METs by discharge. Goal met patient currently working at 5.9 METs.  2. Have a plan in place for continued exercise after the program by discharge. Will question patient re: exercise plans prior to discharge    Exercise Prescription:   Based on 12 Minute Walk Test  Frequency: 3 days per week  Duration (total aerobic min.): 30 minutes  Intensity RPE: 11-14  Target HR:103-136  MET Level Range: 1.7-5.8    Date of first exercise session:     Modality METS Load  Duration   1 Warm Up    05:00   2 Treadmill 6 3.1@6%  06:00   3 NuStep 3.6 92 Aguiar 7 06:00   4 Weights  90 Load  06:00   5 Rowing Machine 5.7 55Watts  06:00   6 Upright Bike 4.2 60Watts 3 06:00   7 Cool Down     05:00     Exercise Intervention/Education:   *Aim to progress 1 MET every 6 Weeks or as tolerated. Progressing as expected.  *Incorporate resistance training for muscular endurance and strength. Pt began resistance training and progressing well.  Education Provided: Benefits of Exercise, Enjoying Exercise, Home Exercise, Diabetes and Exercise, Resistance Training Parts 1-2, Flexibility, Diabetes and Exercise    LEARNING ASSESSMENT & BARRIERS  Readiness to Learn: NA  Barriers: None  Comments: NA    FALL RISK  high  Comments: NA    INDIVIDUAL PATIENT GOALS:  Improve fitness and stamina by discharge.  2. Lose 5 pounds by discharge.    MEDICATIONS  Current Outpatient Medications   Medication  Instructions    aspirin 81 mg, oral, Daily    atorvastatin (Lipitor) 40 mg tablet TAKE 1 TABLET BY MOUTH ONCE DAILY    brivaracetam (Briviact) 50 mg tablet tablet TAKE 1 TABLET BY MOUTH TWO TIMES A DAY    warfarin (Coumadin) 2.5 mg tablet Take 3 tablets daily or as directed by  coumadin clinic        STAFF COMMENTS: Patient has attended 30/36 sessions of cardiac rehab after MVR on 7/24/23. Tolerating prescribed workloads well without reports of dyspnea, angina or acute pain that does not resolve with rest. Stable cardiovascular response to exercise. Telemetry shows SR-ST with PVCS, bigeminy. Met with patient one on one to discuss goals after discharge from the program. Doing well in CR and enjoys coming to class. Thank you for allowing us to participate in [facility].

## 2024-01-11 ENCOUNTER — APPOINTMENT (OUTPATIENT)
Dept: CARDIOLOGY | Facility: CLINIC | Age: 74
End: 2024-01-11
Payer: MEDICARE

## 2024-01-11 ENCOUNTER — OFFICE VISIT (OUTPATIENT)
Dept: CARDIOLOGY | Facility: HOSPITAL | Age: 74
End: 2024-01-11
Payer: MEDICARE

## 2024-01-11 VITALS
DIASTOLIC BLOOD PRESSURE: 71 MMHG | HEIGHT: 68 IN | BODY MASS INDEX: 28.79 KG/M2 | HEART RATE: 84 BPM | OXYGEN SATURATION: 96 % | WEIGHT: 190 LBS | SYSTOLIC BLOOD PRESSURE: 147 MMHG

## 2024-01-11 DIAGNOSIS — I48.91 ATRIAL FIBRILLATION, UNSPECIFIED TYPE (MULTI): ICD-10-CM

## 2024-01-11 DIAGNOSIS — I48.0 PAROXYSMAL ATRIAL FIBRILLATION (MULTI): ICD-10-CM

## 2024-01-11 PROCEDURE — 1159F MED LIST DOCD IN RCRD: CPT | Performed by: INTERNAL MEDICINE

## 2024-01-11 PROCEDURE — 1160F RVW MEDS BY RX/DR IN RCRD: CPT | Performed by: INTERNAL MEDICINE

## 2024-01-11 PROCEDURE — 1157F ADVNC CARE PLAN IN RCRD: CPT | Performed by: INTERNAL MEDICINE

## 2024-01-11 PROCEDURE — 99204 OFFICE O/P NEW MOD 45 MIN: CPT | Performed by: INTERNAL MEDICINE

## 2024-01-11 PROCEDURE — 99214 OFFICE O/P EST MOD 30 MIN: CPT | Performed by: INTERNAL MEDICINE

## 2024-01-11 PROCEDURE — 1126F AMNT PAIN NOTED NONE PRSNT: CPT | Performed by: INTERNAL MEDICINE

## 2024-01-11 PROCEDURE — 1036F TOBACCO NON-USER: CPT | Performed by: INTERNAL MEDICINE

## 2024-01-11 ASSESSMENT — PAIN SCALES - GENERAL: PAINLEVEL: 0-NO PAIN

## 2024-01-12 ENCOUNTER — APPOINTMENT (OUTPATIENT)
Dept: CARDIAC REHAB | Facility: CLINIC | Age: 74
End: 2024-01-12
Payer: MEDICARE

## 2024-01-12 ENCOUNTER — CLINICAL SUPPORT (OUTPATIENT)
Dept: CARDIAC REHAB | Facility: CLINIC | Age: 74
End: 2024-01-12
Payer: MEDICARE

## 2024-01-12 DIAGNOSIS — Z98.890 PERSONAL HISTORY OF SURGERY TO HEART AND GREAT VESSELS, PRESENTING HAZARDS TO HEALTH: ICD-10-CM

## 2024-01-12 PROCEDURE — 93798 PHYS/QHP OP CAR RHAB W/ECG: CPT | Performed by: INTERNAL MEDICINE

## 2024-01-15 ENCOUNTER — CLINICAL SUPPORT (OUTPATIENT)
Dept: CARDIAC REHAB | Facility: CLINIC | Age: 74
End: 2024-01-15
Payer: MEDICARE

## 2024-01-15 ENCOUNTER — ANTICOAGULATION - WARFARIN VISIT (OUTPATIENT)
Dept: CARDIOLOGY | Facility: CLINIC | Age: 74
End: 2024-01-15
Payer: MEDICARE

## 2024-01-15 ENCOUNTER — LAB (OUTPATIENT)
Dept: LAB | Facility: LAB | Age: 74
End: 2024-01-15
Payer: MEDICARE

## 2024-01-15 DIAGNOSIS — I48.0 PAROXYSMAL ATRIAL FIBRILLATION (MULTI): Primary | ICD-10-CM

## 2024-01-15 DIAGNOSIS — I48.91 ATRIAL FIBRILLATION, UNSPECIFIED TYPE (MULTI): ICD-10-CM

## 2024-01-15 DIAGNOSIS — Z98.890 PERSONAL HISTORY OF SURGERY TO HEART AND GREAT VESSELS, PRESENTING HAZARDS TO HEALTH: ICD-10-CM

## 2024-01-15 LAB
ANION GAP SERPL CALC-SCNC: 10 MMOL/L (ref 10–20)
BUN SERPL-MCNC: 25 MG/DL (ref 6–23)
CALCIUM SERPL-MCNC: 9.1 MG/DL (ref 8.6–10.3)
CHLORIDE SERPL-SCNC: 106 MMOL/L (ref 98–107)
CO2 SERPL-SCNC: 30 MMOL/L (ref 21–32)
CREAT SERPL-MCNC: 1.07 MG/DL (ref 0.5–1.3)
EGFRCR SERPLBLD CKD-EPI 2021: 74 ML/MIN/1.73M*2
ERYTHROCYTE [DISTWIDTH] IN BLOOD BY AUTOMATED COUNT: 16.7 % (ref 11.5–14.5)
GLUCOSE SERPL-MCNC: 99 MG/DL (ref 74–99)
HCT VFR BLD AUTO: 41.6 % (ref 41–52)
HGB BLD-MCNC: 13 G/DL (ref 13.5–17.5)
MCH RBC QN AUTO: 27.3 PG (ref 26–34)
MCHC RBC AUTO-ENTMCNC: 31.3 G/DL (ref 32–36)
MCV RBC AUTO: 87 FL (ref 80–100)
NRBC BLD-RTO: 0.1 /100 WBCS (ref 0–0)
PLATELET # BLD AUTO: 573 X10*3/UL (ref 150–450)
POC INR: 2.5
POC PROTHROMBIN TIME: NORMAL
POTASSIUM SERPL-SCNC: 4.6 MMOL/L (ref 3.5–5.3)
RBC # BLD AUTO: 4.76 X10*6/UL (ref 4.5–5.9)
SODIUM SERPL-SCNC: 141 MMOL/L (ref 136–145)
WBC # BLD AUTO: 14.7 X10*3/UL (ref 4.4–11.3)

## 2024-01-15 PROCEDURE — 36415 COLL VENOUS BLD VENIPUNCTURE: CPT

## 2024-01-15 PROCEDURE — 85027 COMPLETE CBC AUTOMATED: CPT

## 2024-01-15 PROCEDURE — 80048 BASIC METABOLIC PNL TOTAL CA: CPT

## 2024-01-15 PROCEDURE — 99211 OFF/OP EST MAY X REQ PHY/QHP: CPT | Performed by: INTERNAL MEDICINE

## 2024-01-15 PROCEDURE — 85610 PROTHROMBIN TIME: CPT | Mod: QW

## 2024-01-15 PROCEDURE — 93798 PHYS/QHP OP CAR RHAB W/ECG: CPT | Performed by: INTERNAL MEDICINE

## 2024-01-15 NOTE — PROGRESS NOTES
Patient identification verified with 2 identifiers.    Location: [facility] - [address] [place] [id number] [phone]     Referring Physician: Dr. Pramod Weathers  Enrollment/ Re-enrollment date: 8/3/2024   INR Goal: 2.0-3.0  INR monitoring is per [facility] protocol.  Anticoagulation Medication: warfarin  Indication: atrial fibrillation    Subjective   Bleeding signs/symptoms: No  Bruising: No   Major bleeding event: No  Thrombosis signs/symptoms: No  Thromboembolic event: No  Missed doses: No  Extra doses: No  Medication changes: No  Dietary changes: No  Change in health: Yes  Pt will have a watchman device procedure on 24; he will discontinue warfarin on 24 permanently  Change in activity: No  Alcohol: No  Other concerns: No    Upcoming Surgeries:  Does the Patient Have any upcoming surgeries that require interruption in anticoagulation therapy? no  Does the patient require bridging? no      Anticoagulation Summary  As of 1/15/2024      INR goal:  2.0-3.0   TTR:  72.3 % (3.6 mo)   INR used for dosin.50 (1/15/2024)   Weekly warfarin total:  52.5 mg               Assessment/Plan   Therapeutic     1. New dose: no change    2. Next INR: Pt reports that he will have a watchman implanted on 24 by Dr. Bray and will no longer need to be seen in our clinic.  This was his last visit.      Education provided to patient during the visit:  Patient instructed to call in interim with questions, concerns and changes.   Patient educated on interactions between medications and warfarin.   Patient educated on dietary consistency in vitamin k consumption.   Patient educated on affects of alcohol consumption while taking warfarin.   Patient educated on signs of bleeding/clotting.   Patient educated on compliance with dosing, follow up appointments, and prescribed plan of care.

## 2024-01-17 ENCOUNTER — CLINICAL SUPPORT (OUTPATIENT)
Dept: CARDIAC REHAB | Facility: CLINIC | Age: 74
End: 2024-01-17
Payer: MEDICARE

## 2024-01-17 DIAGNOSIS — Z98.890 PERSONAL HISTORY OF SURGERY TO HEART AND GREAT VESSELS, PRESENTING HAZARDS TO HEALTH: ICD-10-CM

## 2024-01-17 PROCEDURE — 93798 PHYS/QHP OP CAR RHAB W/ECG: CPT | Performed by: INTERNAL MEDICINE

## 2024-01-18 NOTE — PROGRESS NOTES
FOLLOW UP PICTURE YOUR PLATE DIET ASSESSMENT  CARDIAC REHAB    SCORES:  Vegetables & Fruit (out of 12)                 7   Breads, Grains & Cereals (out of 12)        8  Red & Processed Meat (out of 12)         9  Poultry (out of 2)                                   2  Fish & Shellfish (out of 4)                      2  Beans, Nuts & Seeds (out of 4)             2  Milk & Dairy Foods (out of 6)              5  Toppings, Oils, Seasonings & Salt (out of 20)    17  Sweets, Snacks & Restaurant Food (out of 14)    9  Beverages (out of 10)          10     Overall Score (out of 96)    71  Pre vs Post Overall Score Change:  INCREASE/DECREASE: no change    GOALS  Aim to consume at least 5 fruits and vegetables/d.     GOALS MET:  YES /NO: No

## 2024-01-19 ENCOUNTER — CLINICAL SUPPORT (OUTPATIENT)
Dept: CARDIAC REHAB | Facility: CLINIC | Age: 74
End: 2024-01-19
Payer: MEDICARE

## 2024-01-19 DIAGNOSIS — Z98.890 PERSONAL HISTORY OF SURGERY TO HEART AND GREAT VESSELS, PRESENTING HAZARDS TO HEALTH: ICD-10-CM

## 2024-01-19 PROCEDURE — 93798 PHYS/QHP OP CAR RHAB W/ECG: CPT | Performed by: INTERNAL MEDICINE

## 2024-01-22 ENCOUNTER — TRANSCRIBE ORDERS (OUTPATIENT)
Dept: CARDIAC REHAB | Facility: CLINIC | Age: 74
End: 2024-01-22
Payer: MEDICARE

## 2024-01-22 ENCOUNTER — CLINICAL SUPPORT (OUTPATIENT)
Dept: CARDIAC REHAB | Facility: CLINIC | Age: 74
End: 2024-01-22
Payer: MEDICARE

## 2024-01-22 DIAGNOSIS — Z98.890 S/P HEART VALVE REPAIR: Primary | ICD-10-CM

## 2024-01-22 DIAGNOSIS — Z98.890 S/P HEART VALVE REPAIR: ICD-10-CM

## 2024-01-22 PROCEDURE — 93798 PHYS/QHP OP CAR RHAB W/ECG: CPT | Performed by: INTERNAL MEDICINE

## 2024-01-23 PROBLEM — I48.91 ATRIAL FIBRILLATION (MULTI): Status: ACTIVE | Noted: 2024-01-11

## 2024-01-24 ENCOUNTER — CLINICAL SUPPORT (OUTPATIENT)
Dept: CARDIAC REHAB | Facility: CLINIC | Age: 74
End: 2024-01-24
Payer: MEDICARE

## 2024-01-24 DIAGNOSIS — Z98.890 S/P HEART VALVE REPAIR: ICD-10-CM

## 2024-01-24 PROCEDURE — 93798 PHYS/QHP OP CAR RHAB W/ECG: CPT | Performed by: INTERNAL MEDICINE

## 2024-01-26 DIAGNOSIS — I48.91 ATRIAL FIBRILLATION, UNSPECIFIED TYPE (MULTI): ICD-10-CM

## 2024-01-29 NOTE — PROGRESS NOTES
INDIVIDUAL CARDIAC TREATMENT PLAN- DISCHARGE SUMMARY      Name: Waldo Cohen   Today's Date: 24   : 1950    Primary Provider: Christopher Meyers  MRN: [id number]    Referring Physician: Pramod Weathers      Diagnosis: Mitral Valve Repair  Onset Date: 23      Risk Stratification: High      NUTRITION DISCHARGE/ FOLLOW-UP   Lipids  Lipid Lab Date: 23  Total Chol:164  HDL:26.2  LDL:120  Tri  Cholesterol Med: Atorvastatin     Diabetes: No  HbA1c: 5.3  Date Checked: 3/01/22  Monitors glucose at home: No  Fasting Blood Sugar Range: NA  Frequency:NA  Hypoglycemic Episode: NA    Weight Management  Weight: 190  Height: 68 IN.  BMI:  28.03  Body Composition: 27.6  Waist Circumference: 41  Current Diet: Heart Healthy  Barriers to dietary change:    Initial Dietary Assessment Score: / Dietary Reassessment       NUTRITION PLAN  Nutrition Goals:   1. Improve Picture Your Plate assessment results by discharge. Reviewed dietary assessment tip sheets with pt. Pt participated in the nutritional lecture series with RD. Goal not met. Pt's score stayed the same.  2. Make changes to diet to include heart healthy options while in the program. Goal Met as pt stated he has increased fruits and vegetables to his diet.     Nutrition Intervention/Education:   *Sent dietician Picture Your Plate assessment for scoring and recommendations.   *Perform weekly weight checks on .   *Body Composition completed by Exercise Physiologist.  Education Provided: Registered Dietician Lectures: Healthier Eating Out & Snacking, Heart Healthy Shopping, Cooking & Portion Distortion, Focus for Heart Healthy Eating, Sodium: Shaking the Habit, Current Topics in Nutrition      OTHER CORE COMPONENTS/ RISK FACTORS ASSESSMENT  Medication compliance: good compliance  Using pill box: Yes  Carries medication list: Yes    Blood Pressure Management:  History of High BP: Yes  Resting BP: 112/60    Tobacco: FORMER  Quit Date:  "1991  Other forms of tobacco: NA  Anyone in the house smoke: No    Initial Knowledge Test Score: 14 Post Test Score: 14    OTHER CORE COMPONENTS/ RISK FACTORS DISCHARGE/ FOLLOW-UP   Other Core components/Risk Factor Goals:                                                                                                                                                      1. Achieve and maintain a resting blood pressure less than 130/80 while in the program. Goal partially met, pt BP was less than 130/80 after class, but sometimes elevated prior to class.   2. Gain knowledge of cardiac disease and lifestyle modifications related to exercise and ADL's prior to discharge. Pt actively participates in weekly education lectures. Goal Met  Other Components/ Risk Factors Intervention/Education:  *Will continue to monitor HR, BP, dyspnea and arrhythmias each session.   *Will meet with patient to discuss goals & progress.  *Encouraged review of education materials.   Education Provided: Peripheral Artery Disease, Living with HF and Hands Only CPR, Managing Cholesterol, Individual Cholesterol Levels, HTN, Angina and NTG Use, Medication Overview and Safety, Intimacy and Heart Disease, PAD, Hands Only CPR      PSYCHOSOCIAL DISCHARGE/ FOLLOW-UP   Patient reported stress level: mild  Using stress management skills: Yes  HX of anxiety: No  HX of depression: Yes  Patient questioned regarding any new stress, depression and anxiety symptoms: Yes    Family/Support System: Wife, family, friends, S[employer]ism  Seeing mental health provider: No  Psychosocial medications: NA    Initial PHQ-9 score: 0 Post PHQ-9 Score: 1 \"None\"  Was PHQ-9 faxed to provider: No  Date faxed: KARLA    Quality of Life Survey:   PCS: 52.87 Post PCS: 49.01  MCS:36.59 Post MCS: 53.66    Stages of change:  Action - Pt will exercise at [place] [employer] [place]    PSYCHOSOCIAL PLAN  Psychosocial Goals:  1. Increase/maintain PCS and MCS scores by discharge. Goal partially met. " Pt improved his mental quality of life score, but decreased the physical quality of life score. Pt is scheduled for the Watchman procedure and thinks this will help.   2. Decrease/maintain PHQ-9 category classification while in the program. Goal Met.  Psychosocial Interventions/ Education:  * Provided one on one emotional support  *Facilitated peer support within the context of other phase II patients while in the program.   Education Provided: Stress Series Parts 1-3: The Good, Bad & Ugly, Short-Circuiting Stress, Mastering Stress    EXERCISE DISCHARGE/ FOLLOW-UP   Home Exercise: Yes  Frequency: 4-5 fays/wk  Mode: Bike    EXERCISE PLAN  Exercise Goals:   1. Goal of 5.8 METs by discharge. Goal met patient at 6.2 METs at discharge.  2. Have a plan in place for continued exercise after the program by discharge. Goal Met - pt joined [employer].  Exercise Prescription:   Based on 12 Minute Walk Test  Frequency: 3 days per week  Duration (total aerobic min.): 30 minutes  Intensity RPE: 11-14  Target HR:103-136  MET Level Range: 1.7-6.2    Date of first exercise session: 9/25/23     Modality METS Load  Duration   1 Warm Up    05:00   2 Treadmill 6 3.1@6%  06:00   3 NuStep 3.6 98 Aguiar 7 06:00   4 Weights  90 Load  06:00   5 Rowing Machine 6.2 70Watts  06:00   6 Upright Bike 4.2 60Watts 3 06:00   7 Cool Down     05:00     Exercise Intervention/Education:   *Aim to progress 1 MET every 6 Weeks or as tolerated.   *Incorporate resistance training for muscular endurance and strength. Pt began resistance training and progressing well.  Education Provided: Benefits of Exercise, Enjoying Exercise, Home Exercise, Diabetes and Exercise, Resistance Training Parts 1-2, Flexibility, Diabetes and Exercise    LEARNING ASSESSMENT & BARRIERS  Readiness to Learn: NA  Barriers: None  Comments: NA    FALL RISK  high  Comments: NA    INDIVIDUAL PATIENT GOALS:  Improve fitness and stamina by discharge. Goal Met  2. Lose 5  pounds by discharge. Goal not met, but patient improved body composition.    MEDICATIONS  Current Outpatient Medications   Medication Instructions    aspirin 81 mg, oral, Daily    atorvastatin (Lipitor) 40 mg tablet TAKE 1 TABLET BY MOUTH ONCE DAILY    brivaracetam (Briviact) 50 mg tablet tablet TAKE 1 TABLET BY MOUTH TWO TIMES A DAY    warfarin (Coumadin) 2.5 mg tablet Take 3 tablets daily or as directed by  coumadin clinic        STAFF COMMENTS: Patient has attended 36/36 sessions of cardiac rehab after MVR on 7/24/23. Waldo tolerated the prescribed workloads well without report of dyspnea, angina or acute pain that did not resolve with rest. He had a stable cardiovascular response to exercise. Telemetry showed SR-ST with PVCs, bigeminy at times and PACs. Met with patient one on one to discuss goals after discharge from the program and to review his progress.  Waldo is scheduled to have the watchman procedure next week.  Thank you for allowing us to participate in Waldo's care.

## 2024-01-30 ENCOUNTER — HOSPITAL ENCOUNTER (OUTPATIENT)
Dept: RADIOLOGY | Facility: HOSPITAL | Age: 74
DRG: 274 | End: 2024-01-30
Payer: MEDICARE

## 2024-02-12 DIAGNOSIS — K21.9 GASTROESOPHAGEAL REFLUX DISEASE WITHOUT ESOPHAGITIS: Primary | ICD-10-CM

## 2024-02-12 RX ORDER — PANTOPRAZOLE SODIUM 40 MG/1
40 TABLET, DELAYED RELEASE ORAL
Qty: 90 TABLET | Refills: 0 | Status: SHIPPED | OUTPATIENT
Start: 2024-02-12 | End: 2024-05-01

## 2024-02-12 RX ORDER — PANTOPRAZOLE SODIUM 40 MG/1
40 TABLET, DELAYED RELEASE ORAL
COMMUNITY
End: 2024-02-12 | Stop reason: SDUPTHER

## 2024-02-15 ENCOUNTER — LAB (OUTPATIENT)
Dept: LAB | Facility: LAB | Age: 74
End: 2024-02-15
Payer: MEDICARE

## 2024-02-15 DIAGNOSIS — G40.909 SEIZURE DISORDER (MULTI): ICD-10-CM

## 2024-02-15 DIAGNOSIS — I48.91 ATRIAL FIBRILLATION, UNSPECIFIED TYPE (MULTI): ICD-10-CM

## 2024-02-15 LAB
AMPHETAMINES UR QL SCN: NORMAL
ANION GAP SERPL CALC-SCNC: 13 MMOL/L (ref 10–20)
BARBITURATES UR QL SCN: NORMAL
BENZODIAZ UR QL SCN: NORMAL
BUN SERPL-MCNC: 24 MG/DL (ref 6–23)
BZE UR QL SCN: NORMAL
CALCIUM SERPL-MCNC: 9.2 MG/DL (ref 8.6–10.3)
CANNABINOIDS UR QL SCN: NORMAL
CHLORIDE SERPL-SCNC: 102 MMOL/L (ref 98–107)
CO2 SERPL-SCNC: 29 MMOL/L (ref 21–32)
CREAT SERPL-MCNC: 1.38 MG/DL (ref 0.5–1.3)
EGFRCR SERPLBLD CKD-EPI 2021: 55 ML/MIN/1.73M*2
ERYTHROCYTE [DISTWIDTH] IN BLOOD BY AUTOMATED COUNT: 16.1 % (ref 11.5–14.5)
FENTANYL+NORFENTANYL UR QL SCN: NORMAL
GLUCOSE SERPL-MCNC: 86 MG/DL (ref 74–99)
HCT VFR BLD AUTO: 41.4 % (ref 41–52)
HGB BLD-MCNC: 13.1 G/DL (ref 13.5–17.5)
MCH RBC QN AUTO: 27.5 PG (ref 26–34)
MCHC RBC AUTO-ENTMCNC: 31.6 G/DL (ref 32–36)
MCV RBC AUTO: 87 FL (ref 80–100)
NRBC BLD-RTO: 0.3 /100 WBCS (ref 0–0)
OPIATES UR QL SCN: NORMAL
OXYCODONE+OXYMORPHONE UR QL SCN: NORMAL
PCP UR QL SCN: NORMAL
PLATELET # BLD AUTO: 550 X10*3/UL (ref 150–450)
POTASSIUM SERPL-SCNC: 4.9 MMOL/L (ref 3.5–5.3)
RBC # BLD AUTO: 4.77 X10*6/UL (ref 4.5–5.9)
SODIUM SERPL-SCNC: 139 MMOL/L (ref 136–145)
WBC # BLD AUTO: 11.5 X10*3/UL (ref 4.4–11.3)

## 2024-02-15 PROCEDURE — 80048 BASIC METABOLIC PNL TOTAL CA: CPT

## 2024-02-15 PROCEDURE — 36415 COLL VENOUS BLD VENIPUNCTURE: CPT

## 2024-02-15 PROCEDURE — 85027 COMPLETE CBC AUTOMATED: CPT

## 2024-02-15 PROCEDURE — 80307 DRUG TEST PRSMV CHEM ANLYZR: CPT

## 2024-02-21 ENCOUNTER — APPOINTMENT (OUTPATIENT)
Dept: CARDIOLOGY | Facility: CLINIC | Age: 74
End: 2024-02-21
Payer: MEDICARE

## 2024-02-26 PROBLEM — I48.91 ATRIAL FIBRILLATION, UNSPECIFIED TYPE (MULTI): Status: ACTIVE | Noted: 2024-02-26

## 2024-02-26 NOTE — DISCHARGE INSTRUCTIONS
Anticoagulation Plan: Plavix and 81mg Aspirin for 6 months, then aspirin for life    Do not drive or lift anything heavier than 10lbs for 1 week, or until groin is healed    You will have CT imaging completed in 4 months to assess the effectiveness of the Watchman Device. You will receive a call (within 45 days) in regards to timing of 4 month CT.     Please follow up with your primary cardiologist or PCP in 1-2 weeks     No elective dental procedures or cleanings for 3 months post procedure  You will need dental prophylaxis prior to dental work/cleanings for 6 months     Please call our nurse line for any questions or concerns: [phone]

## 2024-02-27 ENCOUNTER — HOSPITAL ENCOUNTER (INPATIENT)
Facility: HOSPITAL | Age: 74
LOS: 1 days | Discharge: HOME | DRG: 274 | End: 2024-02-27
Attending: INTERNAL MEDICINE | Admitting: INTERNAL MEDICINE
Payer: MEDICARE

## 2024-02-27 ENCOUNTER — APPOINTMENT (OUTPATIENT)
Dept: CARDIOLOGY | Facility: HOSPITAL | Age: 74
DRG: 274 | End: 2024-02-27
Payer: MEDICARE

## 2024-02-27 ENCOUNTER — HOSPITAL ENCOUNTER (OUTPATIENT)
Dept: RADIOLOGY | Facility: HOSPITAL | Age: 74
Discharge: HOME | DRG: 274 | End: 2024-02-27
Payer: MEDICARE

## 2024-02-27 DIAGNOSIS — I48.91 ATRIAL FIBRILLATION, UNSPECIFIED TYPE (MULTI): Primary | ICD-10-CM

## 2024-02-27 DIAGNOSIS — I48.0 PAROXYSMAL ATRIAL FIBRILLATION (MULTI): ICD-10-CM

## 2024-02-27 DIAGNOSIS — Z95.818 PRESENCE OF WATCHMAN LEFT ATRIAL APPENDAGE CLOSURE DEVICE: ICD-10-CM

## 2024-02-27 DIAGNOSIS — Z09 POSTOP CHECK: ICD-10-CM

## 2024-02-27 DIAGNOSIS — I48.11 LONGSTANDING PERSISTENT ATRIAL FIBRILLATION (MULTI): ICD-10-CM

## 2024-02-27 DIAGNOSIS — I48.19 OTHER PERSISTENT ATRIAL FIBRILLATION (MULTI): ICD-10-CM

## 2024-02-27 DIAGNOSIS — I48.91 ATRIAL FIBRILLATION, UNSPECIFIED TYPE (MULTI): ICD-10-CM

## 2024-02-27 DIAGNOSIS — Z01.810 PREPROCEDURAL CARDIOVASCULAR EXAMINATION: ICD-10-CM

## 2024-02-27 PROCEDURE — C1760 CLOSURE DEV, VASC: HCPCS | Performed by: INTERNAL MEDICINE

## 2024-02-27 PROCEDURE — 99153 MOD SED SAME PHYS/QHP EA: CPT | Performed by: INTERNAL MEDICINE

## 2024-02-27 PROCEDURE — C1894 INTRO/SHEATH, NON-LASER: HCPCS | Performed by: INTERNAL MEDICINE

## 2024-02-27 PROCEDURE — 2720000007 HC OR 272 NO HCPCS: Performed by: INTERNAL MEDICINE

## 2024-02-27 PROCEDURE — 75572 CT HRT W/3D IMAGE: CPT | Performed by: RADIOLOGY

## 2024-02-27 PROCEDURE — C1893 INTRO/SHEATH, FIXED,NON-PEEL: HCPCS | Performed by: INTERNAL MEDICINE

## 2024-02-27 PROCEDURE — 02L73DK OCCLUSION OF LEFT ATRIAL APPENDAGE WITH INTRALUMINAL DEVICE, PERCUTANEOUS APPROACH: ICD-10-PCS | Performed by: INTERNAL MEDICINE

## 2024-02-27 PROCEDURE — 33340 PERQ CLSR TCAT L ATR APNDGE: CPT | Performed by: INTERNAL MEDICINE

## 2024-02-27 PROCEDURE — 2780000003 HC OR 278 NO HCPCS: Performed by: INTERNAL MEDICINE

## 2024-02-27 PROCEDURE — 93308 TTE F-UP OR LMTD: CPT | Performed by: INTERNAL MEDICINE

## 2024-02-27 PROCEDURE — 2550000001 HC RX 255 CONTRASTS: Performed by: INTERNAL MEDICINE

## 2024-02-27 PROCEDURE — 85347 COAGULATION TIME ACTIVATED: CPT | Performed by: INTERNAL MEDICINE

## 2024-02-27 PROCEDURE — G0269 OCCLUSIVE DEVICE IN VEIN ART: HCPCS | Mod: TC,59 | Performed by: INTERNAL MEDICINE

## 2024-02-27 PROCEDURE — 2500000004 HC RX 250 GENERAL PHARMACY W/ HCPCS (ALT 636 FOR OP/ED): Performed by: INTERNAL MEDICINE

## 2024-02-27 PROCEDURE — C1889 IMPLANT/INSERT DEVICE, NOC: HCPCS | Performed by: INTERNAL MEDICINE

## 2024-02-27 PROCEDURE — 99152 MOD SED SAME PHYS/QHP 5/>YRS: CPT | Performed by: INTERNAL MEDICINE

## 2024-02-27 PROCEDURE — 93662 INTRACARDIAC ECG (ICE): CPT | Performed by: INTERNAL MEDICINE

## 2024-02-27 PROCEDURE — 93010 ELECTROCARDIOGRAM REPORT: CPT | Performed by: INTERNAL MEDICINE

## 2024-02-27 PROCEDURE — 2500000005 HC RX 250 GENERAL PHARMACY W/O HCPCS: Performed by: INTERNAL MEDICINE

## 2024-02-27 PROCEDURE — 7100000010 HC PHASE TWO TIME - EACH INCREMENTAL 1 MINUTE: Performed by: INTERNAL MEDICINE

## 2024-02-27 PROCEDURE — 2500000001 HC RX 250 WO HCPCS SELF ADMINISTERED DRUGS (ALT 637 FOR MEDICARE OP): Performed by: NURSE PRACTITIONER

## 2024-02-27 PROCEDURE — 93308 TTE F-UP OR LMTD: CPT

## 2024-02-27 PROCEDURE — 2500000001 HC RX 250 WO HCPCS SELF ADMINISTERED DRUGS (ALT 637 FOR MEDICARE OP): Performed by: INTERNAL MEDICINE

## 2024-02-27 PROCEDURE — 93308 TTE F-UP OR LMTD: CPT | Mod: MUE

## 2024-02-27 PROCEDURE — 75572 CT HRT W/3D IMAGE: CPT

## 2024-02-27 PROCEDURE — 7100000009 HC PHASE TWO TIME - INITIAL BASE CHARGE: Performed by: INTERNAL MEDICINE

## 2024-02-27 PROCEDURE — 93005 ELECTROCARDIOGRAM TRACING: CPT

## 2024-02-27 PROCEDURE — 1200000002 HC GENERAL ROOM WITH TELEMETRY DAILY

## 2024-02-27 PROCEDURE — 85347 COAGULATION TIME ACTIVATED: CPT

## 2024-02-27 PROCEDURE — C1759 CATH, INTRA ECHOCARDIOGRAPHY: HCPCS | Performed by: INTERNAL MEDICINE

## 2024-02-27 DEVICE — LEFT ATRIAL APPENDAGE CLOSURE DEVICE WITH DELIVERY SYSTEM
Type: IMPLANTABLE DEVICE | Site: HEART | Status: FUNCTIONAL
Brand: WATCHMAN FLX™

## 2024-02-27 RX ORDER — PANTOPRAZOLE SODIUM 40 MG/1
40 TABLET, DELAYED RELEASE ORAL
Status: DISCONTINUED | OUTPATIENT
Start: 2024-02-28 | End: 2024-02-27 | Stop reason: HOSPADM

## 2024-02-27 RX ORDER — PROCHLORPERAZINE EDISYLATE 5 MG/ML
10 INJECTION INTRAMUSCULAR; INTRAVENOUS EVERY 6 HOURS PRN
Status: DISCONTINUED | OUTPATIENT
Start: 2024-02-27 | End: 2024-02-27 | Stop reason: HOSPADM

## 2024-02-27 RX ORDER — PROCHLORPERAZINE 25 MG/1
25 SUPPOSITORY RECTAL EVERY 12 HOURS PRN
Status: DISCONTINUED | OUTPATIENT
Start: 2024-02-27 | End: 2024-02-27 | Stop reason: HOSPADM

## 2024-02-27 RX ORDER — CEFAZOLIN SODIUM 2 G/100ML
2 INJECTION, SOLUTION INTRAVENOUS ONCE
Status: DISCONTINUED | OUTPATIENT
Start: 2024-02-27 | End: 2024-02-27 | Stop reason: HOSPADM

## 2024-02-27 RX ORDER — HEPARIN SODIUM 1000 [USP'U]/ML
INJECTION, SOLUTION INTRAVENOUS; SUBCUTANEOUS AS NEEDED
Status: DISCONTINUED | OUTPATIENT
Start: 2024-02-27 | End: 2024-02-27 | Stop reason: HOSPADM

## 2024-02-27 RX ORDER — SODIUM CHLORIDE, SODIUM LACTATE, POTASSIUM CHLORIDE, CALCIUM CHLORIDE 600; 310; 30; 20 MG/100ML; MG/100ML; MG/100ML; MG/100ML
75 INJECTION, SOLUTION INTRAVENOUS CONTINUOUS
Status: DISCONTINUED | OUTPATIENT
Start: 2024-02-27 | End: 2024-02-27 | Stop reason: HOSPADM

## 2024-02-27 RX ORDER — MIDAZOLAM HYDROCHLORIDE 1 MG/ML
INJECTION INTRAMUSCULAR; INTRAVENOUS AS NEEDED
Status: DISCONTINUED | OUTPATIENT
Start: 2024-02-27 | End: 2024-02-27 | Stop reason: HOSPADM

## 2024-02-27 RX ORDER — CEFAZOLIN 1 G/1
INJECTION, POWDER, FOR SOLUTION INTRAVENOUS AS NEEDED
Status: DISCONTINUED | OUTPATIENT
Start: 2024-02-27 | End: 2024-02-27 | Stop reason: HOSPADM

## 2024-02-27 RX ORDER — TRAMADOL HYDROCHLORIDE 50 MG/1
50 TABLET ORAL EVERY 6 HOURS PRN
Status: DISCONTINUED | OUTPATIENT
Start: 2024-02-27 | End: 2024-02-27 | Stop reason: HOSPADM

## 2024-02-27 RX ORDER — ACETAMINOPHEN 325 MG/1
650 TABLET ORAL EVERY 6 HOURS PRN
Status: DISCONTINUED | OUTPATIENT
Start: 2024-02-27 | End: 2024-02-27 | Stop reason: HOSPADM

## 2024-02-27 RX ORDER — CLOPIDOGREL BISULFATE 300 MG/1
TABLET, FILM COATED ORAL AS NEEDED
Status: DISCONTINUED | OUTPATIENT
Start: 2024-02-27 | End: 2024-02-27 | Stop reason: HOSPADM

## 2024-02-27 RX ORDER — PANTOPRAZOLE SODIUM 40 MG/10ML
40 INJECTION, POWDER, LYOPHILIZED, FOR SOLUTION INTRAVENOUS
Status: DISCONTINUED | OUTPATIENT
Start: 2024-02-28 | End: 2024-02-27 | Stop reason: HOSPADM

## 2024-02-27 RX ORDER — PROTAMINE SULFATE 10 MG/ML
INJECTION, SOLUTION INTRAVENOUS CONTINUOUS PRN
Status: COMPLETED | OUTPATIENT
Start: 2024-02-27 | End: 2024-02-27

## 2024-02-27 RX ORDER — DOCUSATE SODIUM 100 MG/1
100 CAPSULE, LIQUID FILLED ORAL 2 TIMES DAILY
Status: DISCONTINUED | OUTPATIENT
Start: 2024-02-27 | End: 2024-02-27 | Stop reason: HOSPADM

## 2024-02-27 RX ORDER — ASPIRIN 81 MG/1
81 TABLET ORAL DAILY
Status: DISCONTINUED | OUTPATIENT
Start: 2024-02-27 | End: 2024-02-27 | Stop reason: HOSPADM

## 2024-02-27 RX ORDER — BISMUTH SUBSALICYLATE 262 MG
1 TABLET,CHEWABLE ORAL DAILY
COMMUNITY

## 2024-02-27 RX ORDER — LIDOCAINE HYDROCHLORIDE 20 MG/ML
INJECTION, SOLUTION INFILTRATION; PERINEURAL AS NEEDED
Status: DISCONTINUED | OUTPATIENT
Start: 2024-02-27 | End: 2024-02-27 | Stop reason: HOSPADM

## 2024-02-27 RX ORDER — CLOPIDOGREL BISULFATE 75 MG/1
75 TABLET ORAL DAILY
Qty: 180 TABLET | Refills: 0 | Status: SHIPPED | OUTPATIENT
Start: 2024-02-27

## 2024-02-27 RX ORDER — ACETAMINOPHEN 650 MG/1
650 SUPPOSITORY RECTAL EVERY 6 HOURS PRN
Status: DISCONTINUED | OUTPATIENT
Start: 2024-02-27 | End: 2024-02-27 | Stop reason: HOSPADM

## 2024-02-27 RX ORDER — FENTANYL CITRATE 50 UG/ML
INJECTION, SOLUTION INTRAMUSCULAR; INTRAVENOUS AS NEEDED
Status: DISCONTINUED | OUTPATIENT
Start: 2024-02-27 | End: 2024-02-27 | Stop reason: HOSPADM

## 2024-02-27 RX ORDER — ACETAMINOPHEN 160 MG/5ML
650 SOLUTION ORAL EVERY 6 HOURS PRN
Status: DISCONTINUED | OUTPATIENT
Start: 2024-02-27 | End: 2024-02-27 | Stop reason: HOSPADM

## 2024-02-27 RX ORDER — PROCHLORPERAZINE MALEATE 10 MG
10 TABLET ORAL EVERY 6 HOURS PRN
Status: DISCONTINUED | OUTPATIENT
Start: 2024-02-27 | End: 2024-02-27 | Stop reason: HOSPADM

## 2024-02-27 RX ORDER — NAPROXEN SODIUM 220 MG/1
324 TABLET, FILM COATED ORAL ONCE
Status: COMPLETED | OUTPATIENT
Start: 2024-02-27 | End: 2024-02-27

## 2024-02-27 RX ADMIN — ASPIRIN 81 MG CHEWABLE TABLET 324 MG: 81 TABLET CHEWABLE at 10:15

## 2024-02-27 RX ADMIN — IOHEXOL 70 ML: 350 INJECTION, SOLUTION INTRAVENOUS at 10:10

## 2024-02-27 ASSESSMENT — COLUMBIA-SUICIDE SEVERITY RATING SCALE - C-SSRS
1. IN THE PAST MONTH, HAVE YOU WISHED YOU WERE DEAD OR WISHED YOU COULD GO TO SLEEP AND NOT WAKE UP?: NO
6. HAVE YOU EVER DONE ANYTHING, STARTED TO DO ANYTHING, OR PREPARED TO DO ANYTHING TO END YOUR LIFE?: NO
2. HAVE YOU ACTUALLY HAD ANY THOUGHTS OF KILLING YOURSELF?: NO

## 2024-02-27 NOTE — H&P
PCP: Dr. Spence  Cardio: Dr. Weathers     I was asked by Dr. Weathers to evaluate this patient in consultation for evaluation of left atrial appendage closure.     The patient is a 72-year-old male with mild aortic stenosis, history of severe mitral regurgitation s/p Mvr with a 36 mm ring 7/2023,  hyperlipidemia, seizure disorder, history of TIA, pulmonary hypertension and paroxysmal atrial fibrillation as well as essential thrombocythemia. The patient has low normal LVEF based on transthoracic echocardiogram performed July 2023.  This study again demonstrating successful mitral valve repair with mean gradient of 6 mmHg and no evidence of mitral regurgitation.  Mild aortic valve stenosis was seen.     The patient has history of thrombocythemia which puts him at high risk of both thrombotic and bleeding events.  The patient has indication for lifelong antiplatelet therapy.  The patient was treated with DAPT by his hematologist.  Subsequently, with diagnosis of atrial fibrillation patient has been started on warfarin and his clopidogrel has been stopped.  The patient had extensive discussion with his care team regarding risk of long-term bleeding with concomitant use of anticoagulation and antiplatelet therapy.       In addition, patient also has history of seizure disorder and is concerned of having a trauma related to seizure with use of anticoagulation.        Given the patient's significant thrombocythemia and seizure disorder,  the patient is referred for consideration of left atrial appendage closure for stroke risk reduction.         ROS:  Constitutional: not feeling tired, not feeling poorly, no fever and no chills.   Eyes: no eyesight problems, no blurred vision, no diplopia, no eye pain, no purulent discharge from the eyes, eyes not red, no dryness of the eyes and no itching of the eyes.   ENT: no nosebleeds, no hearing loss, no tinnitus, no earache, no sore throat, no hoarseness, no swollen glands in the  neck and no nasal discharge.   Cardiovascular: no intermittent leg claudication, no chest pain, no tightness or heavy pressure, no shortness of breath, no palpitations, no lower extremity edema, the heart rate was not slow, the heart rate was not fast and as noted in HPI.   Respiratory: no chronic cough, not coughing up sputum,  no wheezing that is consistent with asthma, no asthma, no orthopnea and no postural nocturnal dyspnea.   Gastrointestinal: no change in bowel habits, no blood in stools, no diarrhea, no constipation, no nausea, no vomiting, no abdominal pain, no signs and symptoms of ulcer disease, no garret colored stools and no intolerance to fatty foods.   Genitourinary: no hematuria,  no urinary frequency, no dysuria, no incontinence, no burning sensation during urination, no urinary hesitancy, no nocturia, no genital lesion, no testicular pain, urinary stream is not smaller and urinary stream does not start and stop.   Musculoskeletal: no arthralgias, no myalgias, no joint swelling, no joint stiffness, no muscle weakness, no back pain, no limb pain, no limb swelling and no difficulty walking.   Skin: no skin rashes, no change in skin color and pigmentation, no skin lesions and no skin lumps.   Neurological: no seizures, no frequent falls, no headaches, no dizziness, no tingling, no fainting and no limb weakness.   Psychiatric: no depression, not suicidal, no confusion, no memory lapses or loss, no anxiety, no personality change and no emotional problems.   Endocrine: no goiter, no thyroid disorder, no diabetes mellitus, no excessive thirst, no dry skin, no cold intolerance, no heat intolerance, no erectile dysfunction, no increased urinary frequency, no proptosis and no deepening of the voice.   Hematologic/Lymphatic: no bleeding issues.   All other systems have been reviewed and are negative for complaint.      Physical Exam:      Visit Vitals  Smoking Status Former         Constitutional: alert and in  no acute distress.   Eyes: no erythema, swelling or discharge from the eye .   Ears, Nose, Mouth, and Throat: external inspection of ears and nose is normal , lips, teeth, and gums are normal with good dentition  and oropharynx normal with no erythema, edema, exudate or lesions .   Neck: neck is supple, symmetric, trachea midline, no masses  and no thyromegaly .   Pulmonary: no increased work of breathing or signs of respiratory distress , lungs clear to auscultation. , normal percussion of chest  and chest palpation normal .   Cardiovascular: RRR, no murmur,  no leg edema  Abdomen: abdomen non-tender, no masses  and no hepatomegaly .           Skin:  no skin lesions          Neurologic: non-focal neurologic examination.      Psychiatric judgment and insight is normal , oriented to person, place and time , normal mood and affect .         Labs:           Results for orders placed or performed in visit on 12/18/23   POCT INR manually resulted   Result Value Ref Range     POC INR 2.50       POC Prothrombin Time                Medications:          Current Outpatient Medications   Medication Instructions    aspirin 81 mg, oral, Daily    atorvastatin (Lipitor) 40 mg tablet TAKE 1 TABLET BY MOUTH ONCE DAILY    brivaracetam (Briviact) 50 mg tablet tablet TAKE 1 TABLET BY MOUTH TWO TIMES A DAY    warfarin (Coumadin) 2.5 mg tablet Take 3 tablets daily or as directed by  coumadin clinic            Assessment:       This is a 72-year-old male with history of mitral valve repair, mild aortic stenosis, hyperlipidemia and thrombocythemia and thus increased bleeding risk.  In addition, patient with history of seizure disorder and is at increased risk of catastrophic event if he has a seizure and associated trauma while being on anticoagulation.  Therefore, we will agree with his care team that he is a poor candidate for long-term anticoagulation.     The CHADS-VASC score is 2 and HAS-BLED score is 3. The patient is at increased  risk of both bleeding and stroke.  Plan for LAAC closure today   Today we discussed the left atrial appendage closure procedure. The patient was given written educational handout materials and watched an educational video. All risks, benefits and alternative were discussed.      The risks discussed included but were not limited to vascular complications, sedation related complications, risk of MI, CVA, device embolization, pericardial tamponade and death. The patient verbalized understanding and decided to proceed.    Following device implant, strategy will be for dual antiplatelet therapy with aspirin and clopidogrel for 6 months then aspirin for life.

## 2024-02-27 NOTE — Clinical Note
Patient ID band present and verified. Patient contact is in the lobby. Contact(s) present: spouse and Son.

## 2024-02-27 NOTE — POST-PROCEDURE NOTE
Physician Transition of Care Summary  Invasive Cardiovascular Lab    Procedure Date: 2/27/2024  Attending:    * Golden Bray - Primary  Resident/Fellow/Other Assistant: Surgeon(s) and Role:     * Rick Huston MD - Fellow    Indications:   Pre-op Diagnosis     * Atrial fibrillation, unspecified type (CMS/HCC) [I48.91]    Post-procedure diagnosis:   Post-op Diagnosis     * Atrial fibrillation, unspecified type (CMS/HCC) [I48.91]    Procedure(s):   LAAO (Left Atrial Appendage Occlusion)  55233 - [place] PERQ CLSR TCAT L ATR APNDGE W/ENDOCARDIAL IMPLNT    [place] PERQ CLSR TCAT L ATR APNDGE W/ENDOCARDIAL IMPLNT [41503]      Description of the Procedure:   S/p ALPHONSO closure    Access: Dual right femoral vein access s/p proglide and vascade closure devices.     Device: After confirmation of the device position on fluoroscopy and ICE, anchor with a tug test, compression and seal a 31 mm Watchman was successfully deployed without any immediate complications.     No effusion on ICE was present pre and post watchman deployment.     Recommendations:   ASA and Plavix for 6 months followed by ASA for life   CT in 4 months  Access site monitoring.   TTE today  Likely discharge home today once recovery and monitoring period is complete.      Complications:   none    Stents/Implants:   Cardiovascular Implants       Other Cardiac Implant    Device, Closure, 31mm Watchman Flx Laac - [id number] - Implanted        Inventory item: DEVICE, CLOSURE, 31MM WATCHMAN FLX LAAC Model/Cat number: [id number]    : [facility] Lot number: [id number]    Device identifier: [id number]        As of 2/27/2024       Status: Implanted                                Estimated Blood Loss:   5 mL    Anesthesia: Moderate Sedation Anesthesia Staff: No anesthesia staff entered.    Any Specimen(s) Removed:   Order Name Source Comment Collection Info Order Time   TYPE AND SCREEN Blood, Venous   2/27/2024  9:48 AM     Release result to [employer]    Immediate        ABORH Blood, Venous   2/27/2024  9:48 AM     Release result to MyChart   Immediate        BASIC METABOLIC PANEL Blood, Venous   2/27/2024  1:35 PM     Release result to MyChart   Immediate        MAGNESIUM Blood, Venous   2/27/2024  1:35 PM     Release result to MyChart   Immediate        CBC WITH AUTO DIFFERENTIAL Blood, Venous   2/27/2024  1:35 PM     Release result to MyChart   Immediate        PROTIME-INR Blood, Venous   2/27/2024  1:35 PM     Release result to MyChart   Immediate                Electronically signed by: Rick Huston MD, 2/27/2024 2:27 PM

## 2024-02-27 NOTE — DISCHARGE SUMMARY
Discharge Diagnosis  Atrial fibrillation (CMS/[facility])        Test Results Pending At Discharge  Pending Labs       No current pending labs.            Hospital Course   S/p ALPHONSO closure    Access: Dual right femoral vein access s/p proglide and vascade closure devices.     Device: After confirmation of the device position on fluoroscopy and ICE, anchor with a tug test, compression and seal a 31 mm Watchman was successfully deployed without any immediate complications.     No effusion on ICE was present pre and post watchman deployment.     Recommendations:   ASA and Plavix for 6 months followed by ASA for life   CT in 4 months  Access site monitoring.   TTE today  Likely discharge home today once recovery and monitoring period is complete.      Pertinent Physical Exam At Time of Discharge  AO x 3  CVS- normal s1, s1, no murmurs  Resp -CTAB  Abd- Soft, NT, ND  Neuro  No gross motor. Sensory deficits   Groin access sites no hematoma, swelling   No LE edema       Home Medications     Medication List      START taking these medications     clopidogrel 75 mg tablet; Commonly known as: Plavix; Take 1 tablet (75   mg) by mouth once daily.     CONTINUE taking these medications     aspirin 81 mg EC tablet   atorvastatin 40 mg tablet; Commonly known as: Lipitor; TAKE 1 TABLET BY   MOUTH ONCE DAILY   brivaracetam 50 mg tablet tablet; Commonly known as: Briviact; TAKE 1   TABLET BY MOUTH TWO TIMES A DAY   multivitamin tablet   pantoprazole 40 mg EC tablet; Commonly known as: ProtoNix; Take 1 tablet   (40 mg) by mouth once daily in the morning. Take before meals.     STOP taking these medications     warfarin 2.5 mg tablet; Commonly known as: Coumadin       Outpatient Follow-Up  Future Appointments   Date Time Provider Department Center   3/21/2024 10:00 AM Christopher Meyers MD [id number] [facility]   3/21/2024 11:30 AM Pramod Weathers MD [id number] [facility]   5/13/2024  2:40 PM Greg Giron MD [id number] [facility]   5/21/2024  9:00 AM  Pramod Weathers MD [id number] [facility] [place]   6/27/2024 11:30 AM [facility] CAIC CT 1 CMCCAICCT [facility]   1/8/2025  9:30 AM Debi Gaytan MD [id number] [place]       Rick Huston MD

## 2024-02-27 NOTE — PROGRESS NOTES
Pharmacy Medication History Review    Waldo Cohen is a 73 y.o. male admitted for Atrial fibrillation (CMS/[facility]). Pharmacy reviewed the patient's [id number] medications and allergies for accuracy.    The list below reflects the updated PTA list. Comments regarding how patient may be taking medications differently can be found in the Admit Orders Activity  Prior to Admission Medications   Prescriptions Last Dose Informant   aspirin 81 mg EC tablet 2/26/2024 Self   Sig: Take 1 tablet (81 mg) by mouth once daily.   atorvastatin (Lipitor) 40 mg tablet 2/26/2024 Self   Sig: TAKE 1 TABLET BY MOUTH ONCE DAILY   brivaracetam (Briviact) 50 mg tablet tablet 2/27/2024 Self   Sig: TAKE 1 TABLET BY MOUTH TWO TIMES A DAY   multivitamin tablet 2/26/2024 Self   Sig: Take 1 tablet by mouth once daily.   pantoprazole (ProtoNix) 40 mg EC tablet 2/26/2024 Self   Sig: Take 1 tablet (40 mg) by mouth once daily in the morning. Take before meals.   warfarin (Coumadin) 2.5 mg tablet 2/20/2024 Self   Sig: Take 3 tablets daily or as directed by  coumadin clinic      Facility-Administered Medications: None        The list below reflects the updated allergy list. Please review each documented allergy for additional clarification and justification.  Allergies  Reviewed by Geni Aviles PharmD on 2/27/2024        Severity Reactions Comments    Demerol [meperidine] Not Specified GI Upset             Patient was unable to be assessed for M2B at discharge. Pharmacy has been updated to [facility]. M2B service not offered prior to surgery, please reassess prior to patient discharge if Meds to Beds is desired.     Sources used to complete the med history include: Patient interview - had list of medications/ Pharmacy - [facility] mail order,  Eduar Mcwilliams/ Chart review - cardiology note 1/11/24/ SANDRA Aviles PharmD  Transitions of Care Pharmacist   Meds Ambulatory and Retail Services  Please reach out via Secure Chat for  questions, or if no response call [phone] or [facility]era [facility]

## 2024-02-28 LAB — ACT BLD: 360 SEC (ref 89–169)

## 2024-03-05 LAB
ATRIAL RATE: 71 BPM
P AXIS: 45 DEGREES
P OFFSET: 167 MS
P ONSET: 105 MS
PR INTERVAL: 230 MS
Q ONSET: 220 MS
QRS COUNT: 12 BEATS
QRS DURATION: 96 MS
QT INTERVAL: 408 MS
QTC CALCULATION(BAZETT): 443 MS
QTC FREDERICIA: 431 MS
R AXIS: 20 DEGREES
T AXIS: 25 DEGREES
T OFFSET: 424 MS
VENTRICULAR RATE: 71 BPM

## 2024-03-21 ENCOUNTER — OFFICE VISIT (OUTPATIENT)
Dept: PRIMARY CARE | Facility: CLINIC | Age: 74
End: 2024-03-21
Payer: MEDICARE

## 2024-03-21 ENCOUNTER — APPOINTMENT (OUTPATIENT)
Dept: PRIMARY CARE | Facility: CLINIC | Age: 74
End: 2024-03-21
Payer: MEDICARE

## 2024-03-21 ENCOUNTER — OFFICE VISIT (OUTPATIENT)
Dept: CARDIOLOGY | Facility: CLINIC | Age: 74
End: 2024-03-21
Payer: MEDICARE

## 2024-03-21 VITALS
TEMPERATURE: 97.4 F | SYSTOLIC BLOOD PRESSURE: 101 MMHG | DIASTOLIC BLOOD PRESSURE: 65 MMHG | WEIGHT: 187 LBS | HEART RATE: 76 BPM | HEIGHT: 69 IN | BODY MASS INDEX: 27.7 KG/M2

## 2024-03-21 VITALS
WEIGHT: 183 LBS | HEART RATE: 76 BPM | OXYGEN SATURATION: 98 % | BODY MASS INDEX: 27.11 KG/M2 | DIASTOLIC BLOOD PRESSURE: 72 MMHG | SYSTOLIC BLOOD PRESSURE: 126 MMHG | HEIGHT: 69 IN

## 2024-03-21 DIAGNOSIS — I35.0 NONRHEUMATIC AORTIC VALVE STENOSIS: ICD-10-CM

## 2024-03-21 DIAGNOSIS — Z87.891 FORMER SMOKER: ICD-10-CM

## 2024-03-21 DIAGNOSIS — E78.5 DYSLIPIDEMIA: ICD-10-CM

## 2024-03-21 DIAGNOSIS — I10 ESSENTIAL (PRIMARY) HYPERTENSION: ICD-10-CM

## 2024-03-21 DIAGNOSIS — E66.3 OVERWEIGHT WITH BODY MASS INDEX (BMI) OF 27 TO 27.9 IN ADULT: ICD-10-CM

## 2024-03-21 DIAGNOSIS — I25.10 CORONARY ARTERY DISEASE INVOLVING NATIVE CORONARY ARTERY OF NATIVE HEART WITHOUT ANGINA PECTORIS: Primary | ICD-10-CM

## 2024-03-21 DIAGNOSIS — I48.91 ATRIAL FIBRILLATION, UNSPECIFIED TYPE (MULTI): ICD-10-CM

## 2024-03-21 DIAGNOSIS — Z00.00 MEDICARE ANNUAL WELLNESS VISIT, SUBSEQUENT: ICD-10-CM

## 2024-03-21 DIAGNOSIS — I25.10 CORONARY ARTERY DISEASE INVOLVING NATIVE CORONARY ARTERY OF NATIVE HEART WITHOUT ANGINA PECTORIS: ICD-10-CM

## 2024-03-21 DIAGNOSIS — E78.5 HYPERLIPIDEMIA, UNSPECIFIED HYPERLIPIDEMIA TYPE: ICD-10-CM

## 2024-03-21 DIAGNOSIS — Z12.5 PROSTATE CANCER SCREENING: ICD-10-CM

## 2024-03-21 DIAGNOSIS — Z95.818 PRESENCE OF WATCHMAN LEFT ATRIAL APPENDAGE CLOSURE DEVICE: ICD-10-CM

## 2024-03-21 PROBLEM — R73.9 HYPERGLYCEMIA, UNSPECIFIED: Status: ACTIVE | Noted: 2023-07-31

## 2024-03-21 PROBLEM — E87.5 HYPERKALEMIA: Status: ACTIVE | Noted: 2023-07-31

## 2024-03-21 PROCEDURE — 1036F TOBACCO NON-USER: CPT | Performed by: INTERNAL MEDICINE

## 2024-03-21 PROCEDURE — 1157F ADVNC CARE PLAN IN RCRD: CPT | Performed by: INTERNAL MEDICINE

## 2024-03-21 PROCEDURE — 99214 OFFICE O/P EST MOD 30 MIN: CPT | Performed by: FAMILY MEDICINE

## 2024-03-21 PROCEDURE — 1170F FXNL STATUS ASSESSED: CPT | Performed by: FAMILY MEDICINE

## 2024-03-21 PROCEDURE — 3078F DIAST BP <80 MM HG: CPT | Performed by: FAMILY MEDICINE

## 2024-03-21 PROCEDURE — 1159F MED LIST DOCD IN RCRD: CPT | Performed by: FAMILY MEDICINE

## 2024-03-21 PROCEDURE — 1160F RVW MEDS BY RX/DR IN RCRD: CPT | Performed by: FAMILY MEDICINE

## 2024-03-21 PROCEDURE — 99214 OFFICE O/P EST MOD 30 MIN: CPT | Performed by: INTERNAL MEDICINE

## 2024-03-21 PROCEDURE — 1111F DSCHRG MED/CURRENT MED MERGE: CPT | Performed by: INTERNAL MEDICINE

## 2024-03-21 PROCEDURE — 1160F RVW MEDS BY RX/DR IN RCRD: CPT | Performed by: INTERNAL MEDICINE

## 2024-03-21 PROCEDURE — 1126F AMNT PAIN NOTED NONE PRSNT: CPT | Performed by: INTERNAL MEDICINE

## 2024-03-21 PROCEDURE — 1159F MED LIST DOCD IN RCRD: CPT | Performed by: INTERNAL MEDICINE

## 2024-03-21 PROCEDURE — 1036F TOBACCO NON-USER: CPT | Performed by: FAMILY MEDICINE

## 2024-03-21 PROCEDURE — 1111F DSCHRG MED/CURRENT MED MERGE: CPT | Performed by: FAMILY MEDICINE

## 2024-03-21 PROCEDURE — 3074F SYST BP LT 130 MM HG: CPT | Performed by: FAMILY MEDICINE

## 2024-03-21 PROCEDURE — 1157F ADVNC CARE PLAN IN RCRD: CPT | Performed by: FAMILY MEDICINE

## 2024-03-21 PROCEDURE — 3008F BODY MASS INDEX DOCD: CPT | Performed by: FAMILY MEDICINE

## 2024-03-21 RX ORDER — ASPIRIN 81 MG/1
81 TABLET ORAL DAILY
Qty: 90 TABLET | Refills: 3
Start: 2024-03-21

## 2024-03-21 ASSESSMENT — ACTIVITIES OF DAILY LIVING (ADL)
GROCERY_SHOPPING: INDEPENDENT
MANAGING_FINANCES: INDEPENDENT
DRESSING: INDEPENDENT
TAKING_MEDICATION: INDEPENDENT
DOING_HOUSEWORK: INDEPENDENT
BATHING: INDEPENDENT

## 2024-03-21 ASSESSMENT — PATIENT HEALTH QUESTIONNAIRE - PHQ9
1. LITTLE INTEREST OR PLEASURE IN DOING THINGS: NOT AT ALL
2. FEELING DOWN, DEPRESSED OR HOPELESS: NOT AT ALL
SUM OF ALL RESPONSES TO PHQ9 QUESTIONS 1 AND 2: 0

## 2024-03-21 ASSESSMENT — PAIN SCALES - GENERAL: PAINLEVEL: 0-NO PAIN

## 2024-03-21 NOTE — PROGRESS NOTES
Chief Complaint:   No chief complaint on file.     History Of Present Illness:    Waldo Cohen is a 73 y.o. male with a h/o aortic stenosis, mitral regurgitation s/p repair, paroxysmal atrial fibrillation s/p Watchman, essential thrombocythemia on aspirin, pulmonary hypertension, TIA, dyslipidemia, and seizure disorder here for routine follow-up     Overall doing well.  Denies any exertional chest pain or shortness of breath.    The Watchman procedure went well.  He has a follow-up CT scan scheduled for June.    Limited echocardiogram 2/27/2024: EF 60 to 65%.  Abnormal septal motion consistent with postoperative state.  Moderate mitral annular calcification.    Watchman implant 2/27/2024: 31 mm Watchman FLX.  Instructed to use clopidogrel for 6 months and aspirin indefinitely.     Echocardiogram 7/31/2023: EF 50 to 55% with septal motion consistent with postoperative state. S/p MV repair with annular ring. Mean gradient 6 mmHg with heart rate 69 bpm. Mild aortic stenosis.     Mitral valve repair 7/24/2023: Resuspension of cords and 36 mm [employer] mitral annular ring     Left and right heart catheterization 7/10/2023: Mid LAD 60% stenosis. Significant V wave consistent with severe MR. Cardiac output and index 6.6 and 3.3. RVSP 65 mmHg     SPENCER 6/30/2023: EF 55 to 60%. 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 Severe MR. Moderate mitral valve prolapse. RVSP estimated at 14.6 mmHg.     48-hour Holter monitor June 2023: Predominantly sinus rhythm with 2-1 AV block and second-degree type I AV block. PVCs 1%. 2 episodes of nonsustained VT with the longest 9 beats and the fastest 127 bpm. Less than 1% PACs.     Past Medical History:  He has a past medical history of Body mass index (BMI) 28.0-28.9, adult, Cerebral infarction, unspecified (CMS/HCC) (10/24/2016), Gastrojejunal ulcer, unspecified as acute or chronic, without hemorrhage or perforation, Overweight, and Personal history of other diseases of the  "circulatory system.    Past Surgical History:  He has a past surgical history that includes Other surgical history (02/25/2022); Other surgical history (03/14/2022); MR angio head wo IV contrast (3/1/2022); MR angio neck wo IV contrast (3/1/2022); and Cardiac catheterization (N/A, 2/27/2024).      Social History:  He reports that he quit smoking about 40 years ago. His smoking use included cigarettes. He has a 5.00 pack-year smoking history. He has never used smokeless tobacco. He reports current alcohol use of about 1.0 standard drink of alcohol per week. He reports that he does not use drugs.    Family History:  No family history on file.     Allergies:  Demerol [meperidine]    Outpatient Medications:  Current Outpatient Medications   Medication Instructions    aspirin 81 mg, oral, Daily    atorvastatin (Lipitor) 40 mg tablet TAKE 1 TABLET BY MOUTH ONCE DAILY    brivaracetam (Briviact) 50 mg tablet tablet TAKE 1 TABLET BY MOUTH TWO TIMES A DAY    clopidogrel (PLAVIX) 75 mg, oral, Daily    multivitamin tablet 1 tablet, oral, Daily    pantoprazole (PROTONIX) 40 mg, oral, Daily before breakfast       Last Recorded Vitals:  Visit Vitals  /72 (BP Location: Right arm, Patient Position: Sitting)   Pulse 76   Ht 1.753 m (5' 9\")   Wt 83 kg (183 lb)   SpO2 98%   BMI 27.02 kg/m²   Smoking Status Former   BSA 2.01 m²      LASTWT(3):   Wt Readings from Last 3 Encounters:   03/21/24 83 kg (183 lb)   01/11/24 86.2 kg (190 lb)   01/04/24 86.2 kg (190 lb)       Physical Exam:  HEENT: Carotid upstrokes normal with referred bilateral heart murmur. JVP is normal.   Pulmonary: Clear to auscultation bilaterally.  Cardiovascular: S1,S2, regular. II/VI early peaking crescendo decrescendo murmur at the right upper sternal border. No rubs or gallops.   Lower extremities: Warm. 2+ distal pulses. No edema.     Last Labs:  CBC -  Recent Labs     02/15/24  1046 01/15/24  0839 11/13/23  1325   WBC 11.5* 14.7* 13.0*   HGB 13.1* 13.0* 13.5 "   HCT 41.4 41.6 41.4   * 573* 583*   MCV 87 87 85       CMP -  Recent Labs     02/15/24  1046 01/15/24  0839 11/13/23  1325 08/08/23  1042 08/01/23  0500 07/31/23  0733    141 143 140 CANCELED 138   K 4.9 4.6 4.2 5.8* CANCELED 5.1    106 104 102 CANCELED 99   CO2 29 30 31 30 CANCELED 31   ANIONGAP 13 10 12 14 CANCELED 13   BUN 24* 25* 24* 21 CANCELED 30*   CREATININE 1.38* 1.07 1.16 1.17 CANCELED 1.28   EGFR 55* 74 68  --   --   --    MG  --   --   --  2.07 CANCELED 2.13     Recent Labs     11/13/23  1325 08/01/23  0500 07/31/23  0733 07/24/23  1300 07/01/23  1308 05/08/23  1353   ALBUMIN 4.7 CANCELED 3.9   < > 4.6 4.6   ALKPHOS 56  --   --   --  58 56   ALT 22  --   --   --  21 12   AST 19  --   --   --  25 16   BILITOT 0.9  --   --   --  1.4* 1.1    < > = values in this interval not displayed.       LIPID PANEL -   Recent Labs     04/26/23  0815 03/01/22  1504 08/26/21  0710   CHOL 164 180 172   LDLF 120* 123* 121*   HDL 26.2* 25.0* 27.0*   TRIG 87 158* 118       Recent Labs     03/01/22  1504   *   HGBA1C 5.3           Assessment/Plan   1) CAD: 50-60 % stenosis of the LAD. Completely asymptomatic.  We will continue to observe.     2) paroxysmal atrial fibrillation: Watchman device placed February 2024.  Instructed to stay on clopidogrel for 6 months and aspirin indefinitely for the watchman.  He needs to stay on his clopidogrel and aspirin indefinitely because of his essential thrombocytosis.     3) aortic stenosis: Mild by most recent echocardiogram     4) mitral regurgitation: S/p mitral valve repair with 38 mm ring and reattachment of cords.      5) essential thrombocytosis: Continue with clopidogrel and aspirin as per hematology.     6) dyslipidemia: Continue atorvastatin     7) follow-up: 6 months or sooner if needed.  Would likely see him this time again next year as well and then consider annual follow-ups.      Pramod Weathers MD

## 2024-03-21 NOTE — PROGRESS NOTES
Mohit Haas is here today for a follow-up on his hyperlipidemia and atrial fibrillation.  He states that he is currently feeling well and has no new complaints.  He recently had a Watchman device placed about 1 month ago at [facility] and has done well since then.  He recently saw his cardiologist Dr. Weathers as well for follow-up.  He continues on his meds noted.  He sees Dr. Giron for thrombocytosis follow-up and continues on clopidogrel and aspirin daily.  He also follows up with neurology for seizure disorder and continues on Briviact as noted.    Patient ID: Waldo Cohen is a 73 y.o. male who presents for No chief complaint on file.:    Problem List Items Addressed This Visit    None     Past Medical History:   Diagnosis Date    Body mass index (BMI) 28.0-28.9, adult     BMI 28.0-28.9,adult    Cerebral infarction, unspecified (CMS/HCC) 10/24/2016    Cryptogenic stroke    Gastrojejunal ulcer, unspecified as acute or chronic, without hemorrhage or perforation     Sepsis-related gastrointestinal ulceration    Overweight     Overweight (BMI 25.0-29.9)    Personal history of other diseases of the circulatory system     History of coronary atherosclerosis      Past Surgical History:   Procedure Laterality Date    CARDIAC CATHETERIZATION N/A 2/27/2024    Procedure: LAAO (Left Atrial Appendage Occlusion);  Surgeon: Golden Bray MD;  Location: [address] Cardiac Cath Lab;  Service: Cardiovascular;  Laterality: N/A;  Same Day CT 1000    MR HEAD ANGIO WO IV CONTRAST  3/1/2022    MR HEAD ANGIO WO IV CONTRAST 3/1/2022 [facility] EMERGENCY LEGACY    MR NECK ANGIO WO IV CONTRAST  3/1/2022    MR NECK ANGIO WO IV CONTRAST 3/1/2022 [facility] EMERGENCY LEGACY    OTHER SURGICAL HISTORY  02/25/2022    Finger amputation    OTHER SURGICAL HISTORY  03/14/2022    Colonoscopy      No family history on file.   Social History     Socioeconomic History    Marital status:      Spouse name: Not on file    Number of children: Not on  file    Years of education: Not on file    Highest education level: Not on file   Occupational History    Not on file   Tobacco Use    Smoking status: Former     Packs/day: 0.50     Years: 10.00     Additional pack years: 0.00     Total pack years: 5.00     Types: Cigarettes     Quit date:      Years since quittin.2    Smokeless tobacco: Never   Substance and Sexual Activity    Alcohol use: Yes     Alcohol/week: 1.0 standard drink of alcohol     Types: 1 Shots of liquor per week    Drug use: Never    Sexual activity: Not on file   Other Topics Concern    Not on file   Social History Narrative    Not on file     Social Determinants of Health     Financial Resource Strain: Not on file   Food Insecurity: Not on file   Transportation Needs: Not on file   Physical Activity: Not on file   Stress: Not on file   Social Connections: Not on file   Intimate Partner Violence: Not on file   Housing Stability: Not on file      Demerol [meperidine]   Current Outpatient Medications   Medication Sig Dispense Refill    aspirin 81 mg EC tablet Take 1 tablet (81 mg) by mouth once daily.      atorvastatin (Lipitor) 40 mg tablet TAKE 1 TABLET BY MOUTH ONCE DAILY 30 tablet 0    brivaracetam (Briviact) 50 mg tablet tablet TAKE 1 TABLET BY MOUTH TWO TIMES A  tablet 1    clopidogrel (Plavix) 75 mg tablet Take 1 tablet (75 mg) by mouth once daily. 180 tablet 0    multivitamin tablet Take 1 tablet by mouth once daily.      pantoprazole (ProtoNix) 40 mg EC tablet Take 1 tablet (40 mg) by mouth once daily in the morning. Take before meals. 90 tablet 0     No current facility-administered medications for this visit.       Immunization History   Administered Date(s) Administered    Influenza, High Dose Seasonal, Preservative Free 10/01/2018, 2022    Influenza, Unspecified 10/15/2010, 2015, 10/04/2016, 2021, 10/25/2023    Influenza, seasonal, injectable 10/01/2021    Moderna SARS-CoV-2 Vaccination 2022    PPD  Test 07/11/2018, 07/20/2020    Pfizer Purple Cap SARS-CoV-2 04/28/2021, 05/19/2021, 12/03/2021    Pneumococcal Conjugate PCV 7 03/21/2019    Pneumococcal polysaccharide vaccine, 23-valent, age 2 years and older (PNEUMOVAX 23) 10/01/2020, 09/27/2022        Review of Systems   Constitutional: Negative.    HENT: Negative.     Eyes: Negative.    Respiratory: Negative.     Cardiovascular: Negative.    Gastrointestinal: Negative.    Endocrine: Negative.    Genitourinary: Negative.    Musculoskeletal: Negative.    Skin: Negative.    Allergic/Immunologic: Negative.    Hematological: Negative.    Psychiatric/Behavioral: Negative.     All other systems reviewed and are negative.       Vitals:    03/21/24 1525   BP: 101/65   Pulse: 76   Temp: 36.3 °C (97.4 °F)     Vitals:    03/21/24 1525   Weight: 84.8 kg (187 lb)      Physical Exam  Constitutional:       General: He is not in acute distress.     Appearance: Normal appearance.   Neck:      Vascular: No carotid bruit.   Cardiovascular:      Rate and Rhythm: Normal rate and regular rhythm.      Pulses: Normal pulses.      Heart sounds: Normal heart sounds. No murmur heard.     No friction rub. No gallop.   Pulmonary:      Effort: Pulmonary effort is normal. No respiratory distress.      Breath sounds: Normal breath sounds. No wheezing or rales.   Abdominal:      General: There is no distension.      Palpations: Abdomen is soft. There is no mass.      Tenderness: There is no abdominal tenderness. There is no guarding.   Musculoskeletal:      Cervical back: Neck supple.   Neurological:      Mental Status: He is alert.   Psychiatric:         Mood and Affect: Mood normal.         Thought Content: Thought content normal.          ASSESSMENT/PLAN: Hyperlipidemia.  Check CMP and lipid profile.  Continue Lipitor daily as noted.    Status post Watchman device placement stable.  Follow-up with cardiology as scheduled.    History of atrial fibrillation    History of Sloan's esophagus.   Patient will call gastroenterology to determine date of next EGD.  Continue pantoprazole 40 mg daily.    Coronary artery disease followed by cardiology    Seizure disorder managed by neurology.  Continue current meds as noted    Thrombocytosis managed by hematology    Aortic stenosis also followed by cardiology    Obtain CBC CMP lipid profile and PSA  Follow-up with above consultants  Colonoscopy up-to-date  Recommended shingles vaccination  Follow-up in 1 year and call as needed       Scribe Attestation  By signing my name below, I, Alma Medel LPN, Scribe   attest that this documentation has been prepared under the direction and in the presence of Christopher Meyers MD.

## 2024-03-23 ASSESSMENT — ENCOUNTER SYMPTOMS
RESPIRATORY NEGATIVE: 1
ALLERGIC/IMMUNOLOGIC NEGATIVE: 1
MUSCULOSKELETAL NEGATIVE: 1
HEMATOLOGIC/LYMPHATIC NEGATIVE: 1
PSYCHIATRIC NEGATIVE: 1
EYES NEGATIVE: 1
CONSTITUTIONAL NEGATIVE: 1
CARDIOVASCULAR NEGATIVE: 1
ENDOCRINE NEGATIVE: 1
GASTROINTESTINAL NEGATIVE: 1

## 2024-03-28 ENCOUNTER — LAB (OUTPATIENT)
Dept: LAB | Facility: LAB | Age: 74
End: 2024-03-28
Payer: MEDICARE

## 2024-03-28 DIAGNOSIS — Z00.00 MEDICARE ANNUAL WELLNESS VISIT, SUBSEQUENT: ICD-10-CM

## 2024-03-28 DIAGNOSIS — Z12.5 PROSTATE CANCER SCREENING: ICD-10-CM

## 2024-03-28 DIAGNOSIS — E78.5 HYPERLIPIDEMIA, UNSPECIFIED HYPERLIPIDEMIA TYPE: ICD-10-CM

## 2024-03-28 DIAGNOSIS — I10 ESSENTIAL (PRIMARY) HYPERTENSION: ICD-10-CM

## 2024-03-28 DIAGNOSIS — E66.3 OVERWEIGHT WITH BODY MASS INDEX (BMI) OF 27 TO 27.9 IN ADULT: ICD-10-CM

## 2024-03-28 LAB
ALBUMIN SERPL BCP-MCNC: 4.5 G/DL (ref 3.4–5)
ALP SERPL-CCNC: 53 U/L (ref 33–136)
ALT SERPL W P-5'-P-CCNC: 20 U/L (ref 10–52)
ANION GAP SERPL CALC-SCNC: 13 MMOL/L (ref 10–20)
AST SERPL W P-5'-P-CCNC: 19 U/L (ref 9–39)
BILIRUB SERPL-MCNC: 0.9 MG/DL (ref 0–1.2)
BUN SERPL-MCNC: 21 MG/DL (ref 6–23)
CALCIUM SERPL-MCNC: 9.4 MG/DL (ref 8.6–10.3)
CHLORIDE SERPL-SCNC: 105 MMOL/L (ref 98–107)
CHOLEST SERPL-MCNC: 116 MG/DL (ref 0–199)
CHOLESTEROL/HDL RATIO: 4.5
CO2 SERPL-SCNC: 29 MMOL/L (ref 21–32)
CREAT SERPL-MCNC: 1.11 MG/DL (ref 0.5–1.3)
EGFRCR SERPLBLD CKD-EPI 2021: 71 ML/MIN/1.73M*2
ERYTHROCYTE [DISTWIDTH] IN BLOOD BY AUTOMATED COUNT: 15.9 % (ref 11.5–14.5)
GLUCOSE SERPL-MCNC: 105 MG/DL (ref 74–99)
HCT VFR BLD AUTO: 44 % (ref 41–52)
HDLC SERPL-MCNC: 25.8 MG/DL
HGB BLD-MCNC: 13.8 G/DL (ref 13.5–17.5)
LDLC SERPL CALC-MCNC: 65 MG/DL
MCH RBC QN AUTO: 27.2 PG (ref 26–34)
MCHC RBC AUTO-ENTMCNC: 31.4 G/DL (ref 32–36)
MCV RBC AUTO: 87 FL (ref 80–100)
NON HDL CHOLESTEROL: 90 MG/DL (ref 0–149)
NRBC BLD-RTO: 0.2 /100 WBCS (ref 0–0)
PLATELET # BLD AUTO: 492 X10*3/UL (ref 150–450)
POTASSIUM SERPL-SCNC: 4.5 MMOL/L (ref 3.5–5.3)
PROT SERPL-MCNC: 6.6 G/DL (ref 6.4–8.2)
PSA SERPL-MCNC: 0.21 NG/ML
RBC # BLD AUTO: 5.08 X10*6/UL (ref 4.5–5.9)
SODIUM SERPL-SCNC: 142 MMOL/L (ref 136–145)
TRIGL SERPL-MCNC: 128 MG/DL (ref 0–149)
VLDL: 26 MG/DL (ref 0–40)
WBC # BLD AUTO: 9.6 X10*3/UL (ref 4.4–11.3)

## 2024-03-28 PROCEDURE — 80061 LIPID PANEL: CPT

## 2024-03-28 PROCEDURE — 80053 COMPREHEN METABOLIC PANEL: CPT

## 2024-03-28 PROCEDURE — G0103 PSA SCREENING: HCPCS

## 2024-03-28 PROCEDURE — 36415 COLL VENOUS BLD VENIPUNCTURE: CPT

## 2024-03-28 PROCEDURE — 85027 COMPLETE CBC AUTOMATED: CPT

## 2024-04-30 DIAGNOSIS — K21.9 GASTROESOPHAGEAL REFLUX DISEASE WITHOUT ESOPHAGITIS: ICD-10-CM

## 2024-05-01 RX ORDER — PANTOPRAZOLE SODIUM 40 MG/1
TABLET, DELAYED RELEASE ORAL
Qty: 90 TABLET | Refills: 1 | Status: SHIPPED | OUTPATIENT
Start: 2024-05-01

## 2024-05-10 ENCOUNTER — LAB (OUTPATIENT)
Dept: LAB | Facility: CLINIC | Age: 74
End: 2024-05-10
Payer: MEDICARE

## 2024-05-10 DIAGNOSIS — D47.3 ESSENTIAL THROMBOCYTHEMIA (MULTI): ICD-10-CM

## 2024-05-10 LAB
ALBUMIN SERPL BCP-MCNC: 4.2 G/DL (ref 3.4–5)
ALP SERPL-CCNC: 45 U/L (ref 33–136)
ALT SERPL W P-5'-P-CCNC: 19 U/L (ref 10–52)
ANION GAP SERPL CALC-SCNC: 10 MMOL/L (ref 10–20)
AST SERPL W P-5'-P-CCNC: 19 U/L (ref 9–39)
BASOPHILS # BLD AUTO: 0.13 X10*3/UL (ref 0–0.1)
BASOPHILS NFR BLD AUTO: 1.3 %
BILIRUB SERPL-MCNC: 0.9 MG/DL (ref 0–1.2)
BUN SERPL-MCNC: 25 MG/DL (ref 6–23)
CALCIUM SERPL-MCNC: 9.1 MG/DL (ref 8.6–10.3)
CHLORIDE SERPL-SCNC: 104 MMOL/L (ref 98–107)
CO2 SERPL-SCNC: 29 MMOL/L (ref 21–32)
CREAT SERPL-MCNC: 1.07 MG/DL (ref 0.5–1.3)
EGFRCR SERPLBLD CKD-EPI 2021: 74 ML/MIN/1.73M*2
EOSINOPHIL # BLD AUTO: 0.16 X10*3/UL (ref 0–0.4)
EOSINOPHIL NFR BLD AUTO: 1.6 %
ERYTHROCYTE [DISTWIDTH] IN BLOOD BY AUTOMATED COUNT: 15.2 % (ref 11.5–14.5)
GLUCOSE SERPL-MCNC: 95 MG/DL (ref 74–99)
HCT VFR BLD AUTO: 40 % (ref 41–52)
HGB BLD-MCNC: 13 G/DL (ref 13.5–17.5)
IMM GRANULOCYTES # BLD AUTO: 0.1 X10*3/UL (ref 0–0.5)
IMM GRANULOCYTES NFR BLD AUTO: 1 % (ref 0–0.9)
LYMPHOCYTES # BLD AUTO: 0.73 X10*3/UL (ref 0.8–3)
LYMPHOCYTES NFR BLD AUTO: 7.1 %
MCH RBC QN AUTO: 28.1 PG (ref 26–34)
MCHC RBC AUTO-ENTMCNC: 32.5 G/DL (ref 32–36)
MCV RBC AUTO: 87 FL (ref 80–100)
MONOCYTES # BLD AUTO: 0.74 X10*3/UL (ref 0.05–0.8)
MONOCYTES NFR BLD AUTO: 7.2 %
NEUTROPHILS # BLD AUTO: 8.36 X10*3/UL (ref 1.6–5.5)
NEUTROPHILS NFR BLD AUTO: 81.8 %
PLATELET # BLD AUTO: 512 X10*3/UL (ref 150–450)
POTASSIUM SERPL-SCNC: 4.2 MMOL/L (ref 3.5–5.3)
PROT SERPL-MCNC: 6.2 G/DL (ref 6.4–8.2)
RBC # BLD AUTO: 4.62 X10*6/UL (ref 4.5–5.9)
SODIUM SERPL-SCNC: 139 MMOL/L (ref 136–145)
WBC # BLD AUTO: 10.2 X10*3/UL (ref 4.4–11.3)

## 2024-05-10 PROCEDURE — 80053 COMPREHEN METABOLIC PANEL: CPT

## 2024-05-10 PROCEDURE — 85025 COMPLETE CBC W/AUTO DIFF WBC: CPT

## 2024-05-10 PROCEDURE — 36415 COLL VENOUS BLD VENIPUNCTURE: CPT

## 2024-05-13 ENCOUNTER — OFFICE VISIT (OUTPATIENT)
Dept: HEMATOLOGY/ONCOLOGY | Facility: CLINIC | Age: 74
End: 2024-05-13
Payer: MEDICARE

## 2024-05-13 VITALS
DIASTOLIC BLOOD PRESSURE: 73 MMHG | RESPIRATION RATE: 16 BRPM | HEART RATE: 76 BPM | OXYGEN SATURATION: 98 % | TEMPERATURE: 97 F | WEIGHT: 185.41 LBS | SYSTOLIC BLOOD PRESSURE: 130 MMHG | BODY MASS INDEX: 27.38 KG/M2

## 2024-05-13 DIAGNOSIS — G45.9 TIA (TRANSIENT ISCHEMIC ATTACK): Primary | ICD-10-CM

## 2024-05-13 DIAGNOSIS — I34.0 MITRAL VALVE INSUFFICIENCY, UNSPECIFIED ETIOLOGY: ICD-10-CM

## 2024-05-13 DIAGNOSIS — R56.9 SEIZURE (MULTI): ICD-10-CM

## 2024-05-13 DIAGNOSIS — D47.3 ESSENTIAL THROMBOCYTHEMIA (MULTI): ICD-10-CM

## 2024-05-13 PROCEDURE — 99214 OFFICE O/P EST MOD 30 MIN: CPT | Performed by: INTERNAL MEDICINE

## 2024-05-13 PROCEDURE — 1157F ADVNC CARE PLAN IN RCRD: CPT | Performed by: INTERNAL MEDICINE

## 2024-05-13 PROCEDURE — 3078F DIAST BP <80 MM HG: CPT | Performed by: INTERNAL MEDICINE

## 2024-05-13 PROCEDURE — 3075F SYST BP GE 130 - 139MM HG: CPT | Performed by: INTERNAL MEDICINE

## 2024-05-13 PROCEDURE — 3008F BODY MASS INDEX DOCD: CPT | Performed by: INTERNAL MEDICINE

## 2024-05-13 PROCEDURE — 1126F AMNT PAIN NOTED NONE PRSNT: CPT | Performed by: INTERNAL MEDICINE

## 2024-05-13 PROCEDURE — 1160F RVW MEDS BY RX/DR IN RCRD: CPT | Performed by: INTERNAL MEDICINE

## 2024-05-13 PROCEDURE — 1159F MED LIST DOCD IN RCRD: CPT | Performed by: INTERNAL MEDICINE

## 2024-05-13 ASSESSMENT — ENCOUNTER SYMPTOMS
ENDOCRINE NEGATIVE: 1
CARDIOVASCULAR NEGATIVE: 1
BRUISES/BLEEDS EASILY: 1
GASTROINTESTINAL NEGATIVE: 1
MUSCULOSKELETAL NEGATIVE: 1
CONSTITUTIONAL NEGATIVE: 1
PSYCHIATRIC NEGATIVE: 1
RESPIRATORY NEGATIVE: 1
NEUROLOGICAL NEGATIVE: 1
EYES NEGATIVE: 1

## 2024-05-13 ASSESSMENT — PAIN SCALES - GENERAL: PAINLEVEL: 0-NO PAIN

## 2024-05-14 NOTE — PROGRESS NOTES
Patient ID: Waldo Cohen is a 73 y.o. male.  Referring Physician: Greg Giron MD  [address] [place] [address] [place] [address]  Primary Care Provider: Christopher Meyers MD  Visit Type: Follow Up      Subjective    HPI How was my platelet count?    Review of Systems   Constitutional: Negative.    HENT:  Negative.     Eyes: Negative.    Respiratory: Negative.     Cardiovascular: Negative.    Gastrointestinal: Negative.    Endocrine: Negative.    Genitourinary: Negative.     Musculoskeletal: Negative.    Skin: Negative.    Neurological: Negative.    Hematological:  Bruises/bleeds easily.   Psychiatric/Behavioral: Negative.          Objective   BSA: 2.02 meters squared  /73 (BP Location: Right arm)   Pulse 76   Temp 36.1 °C (97 °F) (Temporal)   Resp 16   Wt 84.1 kg (185 lb 6.5 oz)   SpO2 98%   BMI 27.38 kg/m²      has a past medical history of Body mass index (BMI) 28.0-28.9, adult, Cerebral infarction, unspecified (Multi) (10/24/2016), Gastrojejunal ulcer, unspecified as acute or chronic, without hemorrhage or perforation, Overweight, and Personal history of other diseases of the circulatory system.   has a past surgical history that includes Other surgical history (02/25/2022); Other surgical history (03/14/2022); MR angio head wo IV contrast (3/1/2022); MR angio neck wo IV contrast (3/1/2022); and Cardiac catheterization (N/A, 2/27/2024).  No family history on file.  Oncology History    No history exists.       Waldo Cohen  reports that he quit smoking about 40 years ago. His smoking use included cigarettes. He started smoking about 50 years ago. He has a 5 pack-year smoking history. He has never used smokeless tobacco.  He  reports current alcohol use of about 1.0 standard drink of alcohol per week.  He  reports no history of drug use.    Physical Exam  Vitals reviewed.   Constitutional:       Appearance: Normal appearance.   HENT:      Head: Normocephalic.      Mouth/Throat:      Mouth:  Mucous membranes are moist.   Eyes:      Extraocular Movements: Extraocular movements intact.      Pupils: Pupils are equal, round, and reactive to light.   Cardiovascular:      Rate and Rhythm: Normal rate and regular rhythm.      Heart sounds: Normal heart sounds.   Pulmonary:      Breath sounds: Normal breath sounds.   Abdominal:      General: Bowel sounds are normal.      Palpations: Abdomen is soft.   Musculoskeletal:         General: Normal range of motion.      Cervical back: Normal range of motion and neck supple.   Skin:     General: Skin is warm.      Findings: Bruising present.   Neurological:      General: No focal deficit present.      Mental Status: He is alert and oriented to person, place, and time.   Psychiatric:         Mood and Affect: Mood normal.         Behavior: Behavior normal.         WBC   Date/Time Value Ref Range Status   05/10/2024 09:40 AM 10.2 4.4 - 11.3 x10*3/uL Final   03/28/2024 07:23 AM 9.6 4.4 - 11.3 x10*3/uL Final   02/15/2024 10:46 AM 11.5 (H) 4.4 - 11.3 x10*3/uL Final     nRBC   Date Value Ref Range Status   03/28/2024 0.2 (H) 0.0 - 0.0 /100 WBCs Final   02/15/2024 0.3 (H) 0.0 - 0.0 /100 WBCs Final   01/15/2024 0.1 (H) 0.0 - 0.0 /100 WBCs Final     RBC   Date Value Ref Range Status   05/10/2024 4.62 4.50 - 5.90 x10*6/uL Final   03/28/2024 5.08 4.50 - 5.90 x10*6/uL Final   02/15/2024 4.77 4.50 - 5.90 x10*6/uL Final     Hemoglobin   Date Value Ref Range Status   05/10/2024 13.0 (L) 13.5 - 17.5 g/dL Final   03/28/2024 13.8 13.5 - 17.5 g/dL Final   02/15/2024 13.1 (L) 13.5 - 17.5 g/dL Final     Hematocrit   Date Value Ref Range Status   05/10/2024 40.0 (L) 41.0 - 52.0 % Final   03/28/2024 44.0 41.0 - 52.0 % Final   02/15/2024 41.4 41.0 - 52.0 % Final     MCV   Date/Time Value Ref Range Status   05/10/2024 09:40 AM 87 80 - 100 fL Final   03/28/2024 07:23 AM 87 80 - 100 fL Final   02/15/2024 10:46 AM 87 80 - 100 fL Final     MCH   Date/Time Value Ref Range Status   05/10/2024 09:40  "AM 28.1 26.0 - 34.0 pg Final   03/28/2024 07:23 AM 27.2 26.0 - 34.0 pg Final   02/15/2024 10:46 AM 27.5 26.0 - 34.0 pg Final     MCHC   Date/Time Value Ref Range Status   05/10/2024 09:40 AM 32.5 32.0 - 36.0 g/dL Final   03/28/2024 07:23 AM 31.4 (L) 32.0 - 36.0 g/dL Final   02/15/2024 10:46 AM 31.6 (L) 32.0 - 36.0 g/dL Final     RDW   Date/Time Value Ref Range Status   05/10/2024 09:40 AM 15.2 (H) 11.5 - 14.5 % Final   03/28/2024 07:23 AM 15.9 (H) 11.5 - 14.5 % Final   02/15/2024 10:46 AM 16.1 (H) 11.5 - 14.5 % Final     Platelets   Date/Time Value Ref Range Status   05/10/2024 09:40  (H) 150 - 450 x10*3/uL Final   03/28/2024 07:23  (H) 150 - 450 x10*3/uL Final   02/15/2024 10:46  (H) 150 - 450 x10*3/uL Final     No results found for: \"MPV\"  Neutrophils %   Date/Time Value Ref Range Status   05/10/2024 09:40 AM 81.8 40.0 - 80.0 % Final   11/13/2023 01:25 PM 81.8 40.0 - 80.0 % Final   07/20/2023 02:12 PM 80.7 40.0 - 80.0 % Final   07/01/2023 01:08 PM 86.9 40.0 - 80.0 % Final   05/08/2023 01:53 PM 80.3 40.0 - 80.0 % Final     Immature Granulocytes %, Automated   Date/Time Value Ref Range Status   05/10/2024 09:40 AM 1.0 (H) 0.0 - 0.9 % Final     Comment:     Immature Granulocyte Count (IG) includes promyelocytes, myelocytes and metamyelocytes but does not include bands. Percent differential counts (%) should be interpreted in the context of the absolute cell counts (cells/UL).   11/13/2023 01:25 PM 2.0 (H) 0.0 - 0.9 % Final     Comment:     Immature Granulocyte Count (IG) includes promyelocytes, myelocytes and metamyelocytes but does not include bands. Percent differential counts (%) should be interpreted in the context of the absolute cell counts (cells/UL).   07/20/2023 02:12 PM 2.6 (H) 0.0 - 0.9 % Final     Comment:      Immature Granulocyte Count (IG) includes promyelocytes,    myelocytes and metamyelocytes but does not include bands.   Percent differential counts (%) should be interpreted in " the   context of the absolute cell counts (cells/L).     07/01/2023 01:08 PM 1.0 (H) 0.0 - 0.9 % Final     Comment:      Immature Granulocyte Count (IG) includes promyelocytes,    myelocytes and metamyelocytes but does not include bands.   Percent differential counts (%) should be interpreted in the   context of the absolute cell counts (cells/L).     05/08/2023 01:53 PM 2.3 (H) 0.0 - 0.9 % Final     Comment:      Immature Granulocyte Count (IG) includes promyelocytes,    myelocytes and metamyelocytes but does not include bands.   Percent differential counts (%) should be interpreted in the   context of the absolute cell counts (cells/L).       Lymphocytes %   Date/Time Value Ref Range Status   05/10/2024 09:40 AM 7.1 13.0 - 44.0 % Final   11/13/2023 01:25 PM 6.7 13.0 - 44.0 % Final   07/20/2023 02:12 PM 6.3 13.0 - 44.0 % Final   07/01/2023 01:08 PM 4.1 13.0 - 44.0 % Final   05/08/2023 01:53 PM 7.7 13.0 - 44.0 % Final     Monocytes %   Date/Time Value Ref Range Status   05/10/2024 09:40 AM 7.2 2.0 - 10.0 % Final   11/13/2023 01:25 PM 6.9 2.0 - 10.0 % Final   07/20/2023 02:12 PM 6.6 2.0 - 10.0 % Final   07/01/2023 01:08 PM 5.2 2.0 - 10.0 % Final   05/08/2023 01:53 PM 6.7 2.0 - 10.0 % Final     Eosinophils %   Date/Time Value Ref Range Status   05/10/2024 09:40 AM 1.6 0.0 - 6.0 % Final   11/13/2023 01:25 PM 1.7 0.0 - 6.0 % Final   07/20/2023 02:12 PM 2.3 0.0 - 6.0 % Final   07/01/2023 01:08 PM 1.5 0.0 - 6.0 % Final   05/08/2023 01:53 PM 1.6 0.0 - 6.0 % Final     Basophils %   Date/Time Value Ref Range Status   05/10/2024 09:40 AM 1.3 0.0 - 2.0 % Final   11/13/2023 01:25 PM 0.9 0.0 - 2.0 % Final   07/20/2023 02:12 PM 1.5 0.0 - 2.0 % Final   07/01/2023 01:08 PM 1.3 0.0 - 2.0 % Final   05/08/2023 01:53 PM 1.4 0.0 - 2.0 % Final     Neutrophils Absolute   Date/Time Value Ref Range Status   05/10/2024 09:40 AM 8.36 (H) 1.60 - 5.50 x10*3/uL Final     Comment:     Percent differential counts (%) should be interpreted in the  context of the absolute cell counts (cells/uL).   11/13/2023 01:25 PM 10.62 (H) 1.20 - 7.70 x10*3/uL Final     Comment:     Percent differential counts (%) should be interpreted in the context of the absolute cell counts (cells/uL).   07/20/2023 02:12 PM 9.78 (H) 1.20 - 7.70 x10E9/L Final   07/01/2023 01:08 PM 14.68 (H) 1.20 - 7.70 x10E9/L Final   05/08/2023 01:53 PM 8.90 (H) 1.20 - 7.70 x10E9/L Final     Immature Granulocytes Absolute, Automated   Date/Time Value Ref Range Status   05/10/2024 09:40 AM 0.10 0.00 - 0.50 x10*3/uL Final   11/13/2023 01:25 PM 0.26 0.00 - 0.70 x10*3/uL Final     Lymphocytes Absolute   Date/Time Value Ref Range Status   05/10/2024 09:40 AM 0.73 (L) 0.80 - 3.00 x10*3/uL Final   11/13/2023 01:25 PM 0.87 (L) 1.20 - 4.80 x10*3/uL Final   07/20/2023 02:12 PM 0.76 (L) 1.20 - 4.80 x10E9/L Final   07/01/2023 01:08 PM 0.69 (L) 1.20 - 4.80 x10E9/L Final   05/08/2023 01:53 PM 0.85 (L) 1.20 - 4.80 x10E9/L Final     Monocytes Absolute   Date/Time Value Ref Range Status   05/10/2024 09:40 AM 0.74 0.05 - 0.80 x10*3/uL Final   11/13/2023 01:25 PM 0.90 0.10 - 1.00 x10*3/uL Final   07/20/2023 02:12 PM 0.80 0.10 - 1.00 x10E9/L Final   07/01/2023 01:08 PM 0.87 0.10 - 1.00 x10E9/L Final   05/08/2023 01:53 PM 0.74 0.10 - 1.00 x10E9/L Final     Eosinophils Absolute   Date/Time Value Ref Range Status   05/10/2024 09:40 AM 0.16 0.00 - 0.40 x10*3/uL Final   11/13/2023 01:25 PM 0.22 0.00 - 0.70 x10*3/uL Final   07/20/2023 02:12 PM 0.28 0.00 - 0.70 x10E9/L Final   07/01/2023 01:08 PM 0.25 0.00 - 0.70 x10E9/L Final   05/08/2023 01:53 PM 0.18 0.00 - 0.70 x10E9/L Final     Basophils Absolute   Date/Time Value Ref Range Status   05/10/2024 09:40 AM 0.13 (H) 0.00 - 0.10 x10*3/uL Final   11/13/2023 01:25 PM 0.12 (H) 0.00 - 0.10 x10*3/uL Final   07/20/2023 02:12 PM 0.18 (H) 0.00 - 0.10 x10E9/L Final   07/01/2023 01:08 PM 0.22 (H) 0.00 - 0.10 x10E9/L Final   05/08/2023 01:53 PM 0.16 (H) 0.00 - 0.10 x10E9/L Final       No  "components found for: \"PT\"  aPTT   Date/Time Value Ref Range Status   07/25/2023 12:03 AM 31 27 - 38 sec Final     Comment:     Note new reference range as of 6/20/2023 at 10:00am.   07/24/2023 01:15 PM CANCELED       Comment:     Note new reference range as of 6/20/2023 at 10:00am.    Result canceled by the ancillary.     07/24/2023 01:00 PM 39 (H) 27 - 38 sec Final     Comment:     Note new reference range as of 6/20/2023 at 10:00am.     Medication Documentation Review Audit       Reviewed by Kenyatta Henley MA (Medical Assistant) on 05/13/24 at 1431      Medication Order Taking? Sig Documenting Provider Last Dose Status   aspirin 81 mg EC tablet [id number] Yes Take 1 tablet (81 mg) by mouth once daily. Christopher Meyers MD Taking Active   atorvastatin (Lipitor) 40 mg tablet [id number] Yes TAKE 1 TABLET BY MOUTH ONCE DAILY Terri Wolff PA-C Taking Active   brivaracetam (Briviact) 50 mg tablet tablet [id number] Yes TAKE 1 TABLET BY MOUTH TWO TIMES A DAY Debi Gaytan MD Taking Active   clopidogrel (Plavix) 75 mg tablet [id number] Yes Take 1 tablet (75 mg) by mouth once daily. Rick Huston MD Taking Active   multivitamin tablet [id number] Yes Take 1 tablet by mouth once daily. Historical Provider, MD Taking Active   pantoprazole (ProtoNix) 40 mg EC tablet [id number]  TAKE 1 TABLET EVERY MORNING BEFORE A MEAL Christopher Meyers MD  Active                   Assessment/Plan    1) essential thrombocythemia  -JAK2+  -no history of thrombosis and his \"TIA\" was considered questionable  -on ASA  -since last visit, has transitioned from coumadin back to ASA + plavix  -he is wondering how come when he was hospitalized in 7/2023 after mitral valve repair--his platelet count went into normal range--this is because he was placed on unfractionated heparin drip for several days, which contributed to his platelet count trending down into normal range  -labs done on 5/10/2024 include dCBC + COMP  -results reviewed--wbc 10.2, hgb 13.0, " plt 512,000, creatinine 1.07, AST 19, ALT 19  -advised him to continue with ASA +plavix  -has been trying to repair/replace a fence around his property--this has resulted in a lot of bruising on his forearms  -will see him again in 6 months     2) TIA  -on asa  -on plavix     3) seizure disorder  -on keppra     4) mitral valve regurgitation  -on 7/24/2023 he underwent minimally invasive mitral valve repair with [place] near cords and 36 mm [employer] Simuform ring  -after surgery he apparently went into atrial fibrillation and is currently on coumadin     Problem List Items Addressed This Visit    None  Visit Diagnoses         Codes    Essential thrombocythemia (Multi)     D47.3    Relevant Orders    Clinic Appointment Request Follow Up; GREG GIRON; [facility]    CBC and Auto Differential    Comprehensive metabolic panel                 Greg Giron MD

## 2024-05-21 ENCOUNTER — APPOINTMENT (OUTPATIENT)
Dept: CARDIOLOGY | Facility: CLINIC | Age: 74
End: 2024-05-21
Payer: MEDICARE

## 2024-05-28 DIAGNOSIS — G40.909 SEIZURE DISORDER (MULTI): ICD-10-CM

## 2024-05-28 DIAGNOSIS — R56.9 SEIZURE (MULTI): Primary | ICD-10-CM

## 2024-05-28 RX ORDER — WARFARIN 2.5 MG/1
2.5 TABLET ORAL NIGHTLY
COMMUNITY
Start: 2023-07-31 | End: 2024-10-26

## 2024-05-30 ENCOUNTER — APPOINTMENT (OUTPATIENT)
Dept: RADIOLOGY | Facility: HOSPITAL | Age: 74
End: 2024-05-30
Payer: MEDICARE

## 2024-06-04 DIAGNOSIS — G40.909 SEIZURE DISORDER (MULTI): ICD-10-CM

## 2024-06-04 NOTE — PROGRESS NOTES
Pt hypotensive. SBP 70s. Asyptomatic. Pts refill sent to wrong pharmacy can it be resent?    **verified pharmacy**    Pt converted from NSR to AFib

## 2024-06-17 ENCOUNTER — LAB (OUTPATIENT)
Dept: LAB | Facility: LAB | Age: 74
End: 2024-06-17
Payer: MEDICARE

## 2024-06-17 DIAGNOSIS — I48.91 ATRIAL FIBRILLATION, UNSPECIFIED TYPE (MULTI): ICD-10-CM

## 2024-06-17 LAB
ALBUMIN SERPL BCP-MCNC: 4.3 G/DL (ref 3.4–5)
ANION GAP SERPL CALC-SCNC: 12 MMOL/L (ref 10–20)
BUN SERPL-MCNC: 27 MG/DL (ref 6–23)
CALCIUM SERPL-MCNC: 9.1 MG/DL (ref 8.6–10.3)
CHLORIDE SERPL-SCNC: 103 MMOL/L (ref 98–107)
CO2 SERPL-SCNC: 29 MMOL/L (ref 21–32)
CREAT SERPL-MCNC: 1.07 MG/DL (ref 0.5–1.3)
EGFRCR SERPLBLD CKD-EPI 2021: 74 ML/MIN/1.73M*2
GLUCOSE SERPL-MCNC: 115 MG/DL (ref 74–99)
PHOSPHATE SERPL-MCNC: 3.9 MG/DL (ref 2.5–4.9)
POTASSIUM SERPL-SCNC: 5.1 MMOL/L (ref 3.5–5.3)
SODIUM SERPL-SCNC: 139 MMOL/L (ref 136–145)

## 2024-06-17 PROCEDURE — 36415 COLL VENOUS BLD VENIPUNCTURE: CPT

## 2024-06-17 PROCEDURE — 80069 RENAL FUNCTION PANEL: CPT

## 2024-06-27 ENCOUNTER — HOSPITAL ENCOUNTER (OUTPATIENT)
Dept: RADIOLOGY | Facility: HOSPITAL | Age: 74
Discharge: HOME | End: 2024-06-27
Payer: MEDICARE

## 2024-06-27 DIAGNOSIS — I48.91 ATRIAL FIBRILLATION, UNSPECIFIED TYPE (MULTI): ICD-10-CM

## 2024-06-27 PROCEDURE — 75572 CT HRT W/3D IMAGE: CPT

## 2024-06-27 PROCEDURE — 2550000001 HC RX 255 CONTRASTS: Performed by: INTERNAL MEDICINE

## 2024-07-22 ENCOUNTER — CLINICAL SUPPORT (OUTPATIENT)
Dept: AUDIOLOGY | Facility: CLINIC | Age: 74
End: 2024-07-22
Payer: MEDICARE

## 2024-07-22 DIAGNOSIS — H90.3 ASYMMETRICAL SENSORINEURAL HEARING LOSS: ICD-10-CM

## 2024-07-22 DIAGNOSIS — H90.3 SENSORINEURAL HEARING LOSS (SNHL) OF BOTH EARS: ICD-10-CM

## 2024-07-22 DIAGNOSIS — H93.13 TINNITUS OF BOTH EARS: Primary | ICD-10-CM

## 2024-07-22 PROCEDURE — 92550 TYMPANOMETRY & REFLEX THRESH: CPT | Performed by: AUDIOLOGIST

## 2024-07-22 PROCEDURE — 92557 COMPREHENSIVE HEARING TEST: CPT | Performed by: AUDIOLOGIST

## 2024-07-22 ASSESSMENT — PAIN SCALES - GENERAL: PAINLEVEL_OUTOF10: 0 - NO PAIN

## 2024-07-22 ASSESSMENT — ENCOUNTER SYMPTOMS: OCCASIONAL FEELINGS OF UNSTEADINESS: 0

## 2024-07-22 ASSESSMENT — PAIN - FUNCTIONAL ASSESSMENT: PAIN_FUNCTIONAL_ASSESSMENT: 0-10

## 2024-07-22 NOTE — PROGRESS NOTES
AUDIOLOGY ADULT AUDIOMETRIC EVALUATION      Name:  Waldo Cohen  :  1950  Age:  73 y.o.  Date of Evaluation: 24    History:  Reason for visit:  Mr. Waldo Cohen was seen today as part of the visit with Hector De Luna D.O. for an evaluation of hearing.   Chief Complaint   Patient presents with    Tinnitus    Hearing Problem     Reported for the past few months, he has noticed a bilateral constant non-pulsatile ringing tinnitus. Stated the sound has been a constant tonal sound, however, it has not been bothersome to him. Denied any difficulty sleeping or communicating due to the tinnitus. Denied any negative thoughts, depression or anxiety due to the tinnitus.    Stated he has noticed some slight difficulty hearing in the presence of background noise and sometimes around softer speakers.   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   Denied any otalgia, aural fullness, ear pressure, dizziness/vertigo, ear surgery, recent ear/sinus infections, recent falls, sinus/throat concerns, ear drainage, or sudden hearing loss.    EVALUATION     See Audiogram    RESULTS:    Otoscopic Evaluation:   Right Ear: Otoscopy revealed a clear healthy canal and a healthy tympanic membrane was visualized.   Left Ear: Otoscopy revealed a clear healthy canal and a healthy tympanic membrane was visualized.     Immittance:  Immittance Measures: 226 Hz   Right Ear: Tympanometric testing revealed a normal type A tympanogram with normal middle ear pressure and normal static compliance.  Left Ear: Tympanometric testing revealed a normal type A tympanogram with normal middle ear pressure and normal static compliance.    Right Ear: Ipsilateral acoustic reflexes were present at, 500-2,000 Hz, at expected sensation levels and absent at 4,000 Hz.  Left Ear: Ipsilateral acoustic reflexes were present at, 500-2,000 Hz, at expected sensation levels and absent at 4,000  Hz.    Test technique:  Pure Tone Audiometry via insert earphones    Reliability:   excellent    Pure Tone Audiometry:    Right Ear: Audiometric testing indicated normal peripheral hearing sensitivity through 2,000 Hz, sloping to a moderate to mild sensorineural hearing loss above.   Left Ear:   Audiometric testing indicated normal peripheral hearing sensitivity through 1,500 Hz, gradually sloping to a mild sensorineural hearing loss at 2,000 Hz, sloping to a moderately severe sensorineural hearing loss above.   Note asymmetry in the left ear above 1,500 Hz.      Speech Audiometry:   Right Ear:  Speech Reception Threshold (SRT) was obtained at 10 dBHL                  Word Recognition scores were excellent (96%) in quiet when words were presented at 70 dBHL  Left Ear:  Speech Reception Threshold (SRT) was obtained at  15 dBHL                  Word Recognition scores were excellent (92%) in quiet when words were presented at 80 dBHL  Testing was performed with recorded NU-6 speech words in quiet. Speech thresholds were in good agreement with the pure tone averages in each ear.     IMPRESSIONS:  Today's test results are hearing loss requiring medical/otologic and audiologic follow-up.  The patient was counseled with regard to the findings.    Amplification needs:  Hearing aids should be considered pending medical clearance, due to the hearing loss and the patient's concerns for hearing difficulties and tinnitus.     RECOMMENDATIONS:  * Continue medical follow up with Hector De Luna D.O.  * Retest as medically indicated, or sooner if a change in hearing sensitivity or tinnitus is noticed.   * Wear hearing protection while in the presence of loud sounds.   * Use tinnitus coping strategies as needed, such as sound apps on a smart phone, utilizing calming noise in the room, running a fan at night, etc.   * Pending medical management and patient desire, consider returning for a hearing aid evaluation to discuss amplification  options and communication needs. The patient was instructed to call their insurance to check for any hearing aid benefits and to determine if [facility] was in network for hearing aids.   * Use effective communication strategies such as asking the speaker to gain attention prior to speaking, speaking in the same room, repeating words that were heard, etc.    PATIENT EDUCATION:   Discussed results and recommendations with the patient and a copy of the hearing test was provided.  Questions were addressed and the patient was encouraged to contact our department should concerns arise.  The patient was seen from  2:00-2:30 pm.

## 2024-07-23 ENCOUNTER — OFFICE VISIT (OUTPATIENT)
Dept: NEUROLOGY | Facility: CLINIC | Age: 74
End: 2024-07-23
Payer: MEDICARE

## 2024-07-23 ENCOUNTER — APPOINTMENT (OUTPATIENT)
Dept: OTOLARYNGOLOGY | Facility: CLINIC | Age: 74
End: 2024-07-23
Payer: MEDICARE

## 2024-07-23 VITALS
TEMPERATURE: 96.8 F | SYSTOLIC BLOOD PRESSURE: 132 MMHG | DIASTOLIC BLOOD PRESSURE: 82 MMHG | HEART RATE: 72 BPM | BODY MASS INDEX: 26.51 KG/M2 | WEIGHT: 179 LBS | HEIGHT: 69 IN

## 2024-07-23 DIAGNOSIS — G43.109 MIGRAINE WITH AURA AND WITHOUT STATUS MIGRAINOSUS, NOT INTRACTABLE: ICD-10-CM

## 2024-07-23 DIAGNOSIS — G40.909 SEIZURE DISORDER (MULTI): Primary | ICD-10-CM

## 2024-07-23 PROCEDURE — 3075F SYST BP GE 130 - 139MM HG: CPT | Performed by: STUDENT IN AN ORGANIZED HEALTH CARE EDUCATION/TRAINING PROGRAM

## 2024-07-23 PROCEDURE — 1036F TOBACCO NON-USER: CPT | Performed by: STUDENT IN AN ORGANIZED HEALTH CARE EDUCATION/TRAINING PROGRAM

## 2024-07-23 PROCEDURE — 1157F ADVNC CARE PLAN IN RCRD: CPT | Performed by: STUDENT IN AN ORGANIZED HEALTH CARE EDUCATION/TRAINING PROGRAM

## 2024-07-23 PROCEDURE — 3079F DIAST BP 80-89 MM HG: CPT | Performed by: STUDENT IN AN ORGANIZED HEALTH CARE EDUCATION/TRAINING PROGRAM

## 2024-07-23 PROCEDURE — 3008F BODY MASS INDEX DOCD: CPT | Performed by: STUDENT IN AN ORGANIZED HEALTH CARE EDUCATION/TRAINING PROGRAM

## 2024-07-23 PROCEDURE — 1159F MED LIST DOCD IN RCRD: CPT | Performed by: STUDENT IN AN ORGANIZED HEALTH CARE EDUCATION/TRAINING PROGRAM

## 2024-07-23 PROCEDURE — 99214 OFFICE O/P EST MOD 30 MIN: CPT | Performed by: STUDENT IN AN ORGANIZED HEALTH CARE EDUCATION/TRAINING PROGRAM

## 2024-07-23 ASSESSMENT — ENCOUNTER SYMPTOMS
FEVER: 0
FACIAL ASYMMETRY: 0
DEPRESSION: 0
HEADACHES: 1
LIGHT-HEADEDNESS: 0
WEAKNESS: 0
CONFUSION: 0
DIZZINESS: 0
NUMBNESS: 1
SEIZURES: 1
OCCASIONAL FEELINGS OF UNSTEADINESS: 0
HALLUCINATIONS: 0
SPEECH DIFFICULTY: 0
LOSS OF SENSATION IN FEET: 0

## 2024-07-23 ASSESSMENT — LIFESTYLE VARIABLES
HOW OFTEN DO YOU HAVE SIX OR MORE DRINKS ON ONE OCCASION: LESS THAN MONTHLY
SKIP TO QUESTIONS 9-10: 0
AUDIT-C TOTAL SCORE: 2
HOW MANY STANDARD DRINKS CONTAINING ALCOHOL DO YOU HAVE ON A TYPICAL DAY: 1 OR 2
HOW OFTEN DO YOU HAVE A DRINK CONTAINING ALCOHOL: MONTHLY OR LESS

## 2024-07-23 ASSESSMENT — PATIENT HEALTH QUESTIONNAIRE - PHQ9
2. FEELING DOWN, DEPRESSED OR HOPELESS: NOT AT ALL
SUM OF ALL RESPONSES TO PHQ9 QUESTIONS 1 & 2: 0
1. LITTLE INTEREST OR PLEASURE IN DOING THINGS: NOT AT ALL

## 2024-07-23 NOTE — PROGRESS NOTES
"Subjective   Patient ID: Waldo Cohen is a 73 y.o. male who presents for the following    Assessment/Plan   73-year-old male presenting with chief complaint of increased seizure-like activity noting endorse history of 1 seizure daily for the past 12 days.  Past medical history significant for focal nonepileptic seizure currently on Briviact 50 mg twice daily. Previously on Keppra which was effective but discontinued 2/2 mood side effects.       #Left temporal lobe epilepsy   #?Migraine with anant     Plan:  -Will increase patient's Briviact to 100 mg twice daily  -Will order repeat MRI of brain without contrast  -Patient has follow-up appointment with neurology in December advised to keep the appointment  -Counseled patient on reaching out contacting office if seizures do not improve with the increase of Briviact.  -If the symptoms do not improve with the increase with Briviact will consider treating the facial pressure as headache.       Seizures   Associated symptoms include headaches. Pertinent negatives include no confusion and no speech difficulty.     73 year old male presents with chief complaint of worsening seizures x 12 days. Pass medical history significant for focal seizure disorder on Briviact 50 mg twice daily. Patient seen and examined at bedside and able to provide history and good historian. He endorses right sided facial pressure that began at random 12 days ago associated with right hand tingling and right lower leg tingle, described as, \"an ant crawling up the leg\" He tells that the spells of facial pressure are heralded by nausea and nothing else. The facial pressure last about 1 hour to possible 2 and the tingling sensation to the hand and leg last only 30 minutes then resolving. Denies any recent medication change, diet changes or activity changes. He endorses taking his medications as prescribed without missing doses. Denies any chest pain, headache, weakness, lightheadedness, dizziness, " "recent illness, fevers or chills. Noting the patient typical seizures are focal to the right side the body described as dizziness and loss of function of the right upper/lower extremities.      Had a Watchman device placed 3/2024 without issue.     Visit Vitals  /82 (BP Location: Right arm, Patient Position: Sitting, BP Cuff Size: Adult)   Pulse 72   Temp 36 °C (96.8 °F) (Temporal)   Ht 1.753 m (5' 9\")   Wt 81.2 kg (179 lb)   BMI 26.43 kg/m²   Smoking Status Former   BSA 1.99 m²     PHYSICAL EXAM   Physical Exam  Vitals and nursing note reviewed.   Constitutional:       Appearance: Normal appearance.   HENT:      Head: Normocephalic and atraumatic.      Mouth/Throat:      Mouth: Mucous membranes are moist.      Pharynx: Oropharynx is clear.   Eyes:      Conjunctiva/sclera: Conjunctivae normal.      Pupils: Pupils are equal, round, and reactive to light.   Cardiovascular:      Rate and Rhythm: Normal rate and regular rhythm.      Pulses: Normal pulses.      Heart sounds: Normal heart sounds.   Pulmonary:      Effort: Pulmonary effort is normal.      Breath sounds: Normal breath sounds.   Abdominal:      General: Abdomen is flat.      Palpations: Abdomen is soft.   Musculoskeletal:         General: Normal range of motion.      Cervical back: Neck supple.      Right lower leg: No edema.      Left lower leg: No edema.   Skin:     General: Skin is warm and dry.      Capillary Refill: Capillary refill takes less than 2 seconds.   Neurological:      General: No focal deficit present.      Mental Status: He is alert and oriented to person, place, and time.      Comments: Mental Status: Awake and alert.  Oriented to person, place and time. Speech was fluent to history.   CN: (CN 2,3) PERRL. (CN 3,4,6) EOMI, ptosis, nystagmus. (CN 5)Facial sensation was intact to light touch bilaterally. (CN 7)Facial expression was symmetric; NL fold. (CN 8)- hearing. (CN 9,10) Palate elevated symmetrically. (CN 11) Shoulder shrug was " symmetric. (CN 12) Tongue protruded midline.   Motor: Normal muscle bulk and tone. Strength (confrontation testing) was (R/L) 5/5 shoulder abduction, elbow flexion/extension,  strenght, hip flexion, knee flexion/extension, ankle dorsi- and plantar flexion. There were no abnormal movements. NO Pronator drift (upper motor neuron weakness).   Sensory: Intact to light touch in all 4 extremities.   Coordination (cerebellar function): Finger to nose and heel to shin were intact with no dysmetria.   Reflexes: 2+/2+ biceps, brachioradialis, triceps, biceps, patella, and achilles. toes were downgoing bilaterally (plantar).   Gait: Narrow based and normal. Intact heel-raise, toe-raise and tandem gait. Stance, balance, arm swing, ability to heel, toe and tandem walk.             REVIEW OF SYSTEMS   Review of Systems   Constitutional:  Negative for fever.   Neurological:  Positive for seizures, numbness and headaches. Negative for dizziness, facial asymmetry, speech difficulty, weakness and light-headedness.   Psychiatric/Behavioral:  Negative for confusion and hallucinations.          Allergies   Allergen Reactions    Demerol [Meperidine] GI Upset       Current Outpatient Medications   Medication Sig Dispense Refill    aspirin 81 mg EC tablet Take 1 tablet (81 mg) by mouth once daily. 90 tablet 3    atorvastatin (Lipitor) 40 mg tablet TAKE 1 TABLET BY MOUTH ONCE DAILY 30 tablet 0    brivaracetam (Briviact) 50 mg tablet tablet TAKE 1 TABLET BY MOUTH TWO TIMES A  tablet 1    clopidogrel (Plavix) 75 mg tablet Take 1 tablet (75 mg) by mouth once daily. 180 tablet 0    multivitamin tablet Take 1 tablet by mouth once daily.      pantoprazole (ProtoNix) 40 mg EC tablet TAKE 1 TABLET EVERY MORNING BEFORE A MEAL 90 tablet 1    warfarin (Coumadin) 2.5 mg tablet Take 1 tablet (2.5 mg) by mouth once daily at bedtime.       No current facility-administered medications for this visit.       Objective     Lab on 06/17/2024    Component Date Value Ref Range Status    Glucose 06/17/2024 115 (H)  74 - 99 mg/dL Final    Sodium 06/17/2024 139  136 - 145 mmol/L Final    Potassium 06/17/2024 5.1  3.5 - 5.3 mmol/L Final    Chloride 06/17/2024 103  98 - 107 mmol/L Final    Bicarbonate 06/17/2024 29  21 - 32 mmol/L Final    Anion Gap 06/17/2024 12  10 - 20 mmol/L Final    Urea Nitrogen 06/17/2024 27 (H)  6 - 23 mg/dL Final    Creatinine 06/17/2024 1.07  0.50 - 1.30 mg/dL Final    eGFR 06/17/2024 74  >60 mL/min/1.73m*2 Final    Calcium 06/17/2024 9.1  8.6 - 10.3 mg/dL Final    Phosphorus 06/17/2024 3.9  2.5 - 4.9 mg/dL Final    Albumin 06/17/2024 4.3  3.4 - 5.0 g/dL Final   Lab on 05/10/2024   Component Date Value Ref Range Status    WBC 05/10/2024 10.2  4.4 - 11.3 x10*3/uL Final    RBC 05/10/2024 4.62  4.50 - 5.90 x10*6/uL Final    Hemoglobin 05/10/2024 13.0 (L)  13.5 - 17.5 g/dL Final    Hematocrit 05/10/2024 40.0 (L)  41.0 - 52.0 % Final    MCV 05/10/2024 87  80 - 100 fL Final    MCH 05/10/2024 28.1  26.0 - 34.0 pg Final    MCHC 05/10/2024 32.5  32.0 - 36.0 g/dL Final    RDW 05/10/2024 15.2 (H)  11.5 - 14.5 % Final    Platelets 05/10/2024 512 (H)  150 - 450 x10*3/uL Final    Neutrophils % 05/10/2024 81.8  40.0 - 80.0 % Final    Immature Granulocytes %, Automated 05/10/2024 1.0 (H)  0.0 - 0.9 % Final    Lymphocytes % 05/10/2024 7.1  13.0 - 44.0 % Final    Monocytes % 05/10/2024 7.2  2.0 - 10.0 % Final    Eosinophils % 05/10/2024 1.6  0.0 - 6.0 % Final    Basophils % 05/10/2024 1.3  0.0 - 2.0 % Final    Neutrophils Absolute 05/10/2024 8.36 (H)  1.60 - 5.50 x10*3/uL Final    Immature Granulocytes Absolute, Au* 05/10/2024 0.10  0.00 - 0.50 x10*3/uL Final    Lymphocytes Absolute 05/10/2024 0.73 (L)  0.80 - 3.00 x10*3/uL Final    Monocytes Absolute 05/10/2024 0.74  0.05 - 0.80 x10*3/uL Final    Eosinophils Absolute 05/10/2024 0.16  0.00 - 0.40 x10*3/uL Final    Basophils Absolute 05/10/2024 0.13 (H)  0.00 - 0.10 x10*3/uL Final    Glucose 05/10/2024  95  74 - 99 mg/dL Final    Sodium 05/10/2024 139  136 - 145 mmol/L Final    Potassium 05/10/2024 4.2  3.5 - 5.3 mmol/L Final    Chloride 05/10/2024 104  98 - 107 mmol/L Final    Bicarbonate 05/10/2024 29  21 - 32 mmol/L Final    Anion Gap 05/10/2024 10  10 - 20 mmol/L Final    Urea Nitrogen 05/10/2024 25 (H)  6 - 23 mg/dL Final    Creatinine 05/10/2024 1.07  0.50 - 1.30 mg/dL Final    eGFR 05/10/2024 74  >60 mL/min/1.73m*2 Final    Calcium 05/10/2024 9.1  8.6 - 10.3 mg/dL Final    Albumin 05/10/2024 4.2  3.4 - 5.0 g/dL Final    Alkaline Phosphatase 05/10/2024 45  33 - 136 U/L Final    Total Protein 05/10/2024 6.2 (L)  6.4 - 8.2 g/dL Final    AST 05/10/2024 19  9 - 39 U/L Final    Bilirubin, Total 05/10/2024 0.9  0.0 - 1.2 mg/dL Final    ALT 05/10/2024 19  10 - 52 U/L Final   Lab on 03/28/2024   Component Date Value Ref Range Status    Prostate Specific Antigen,Screen 03/28/2024 0.21  <=4.00 ng/mL Final    WBC 03/28/2024 9.6  4.4 - 11.3 x10*3/uL Final    nRBC 03/28/2024 0.2 (H)  0.0 - 0.0 /100 WBCs Final    RBC 03/28/2024 5.08  4.50 - 5.90 x10*6/uL Final    Hemoglobin 03/28/2024 13.8  13.5 - 17.5 g/dL Final    Hematocrit 03/28/2024 44.0  41.0 - 52.0 % Final    MCV 03/28/2024 87  80 - 100 fL Final    MCH 03/28/2024 27.2  26.0 - 34.0 pg Final    MCHC 03/28/2024 31.4 (L)  32.0 - 36.0 g/dL Final    RDW 03/28/2024 15.9 (H)  11.5 - 14.5 % Final    Platelets 03/28/2024 492 (H)  150 - 450 x10*3/uL Final    Glucose 03/28/2024 105 (H)  74 - 99 mg/dL Final    Sodium 03/28/2024 142  136 - 145 mmol/L Final    Potassium 03/28/2024 4.5  3.5 - 5.3 mmol/L Final    Chloride 03/28/2024 105  98 - 107 mmol/L Final    Bicarbonate 03/28/2024 29  21 - 32 mmol/L Final    Anion Gap 03/28/2024 13  10 - 20 mmol/L Final    Urea Nitrogen 03/28/2024 21  6 - 23 mg/dL Final    Creatinine 03/28/2024 1.11  0.50 - 1.30 mg/dL Final    eGFR 03/28/2024 71  >60 mL/min/1.73m*2 Final    Calcium 03/28/2024 9.4  8.6 - 10.3 mg/dL Final    Albumin 03/28/2024 4.5   3.4 - 5.0 g/dL Final    Alkaline Phosphatase 03/28/2024 53  33 - 136 U/L Final    Total Protein 03/28/2024 6.6  6.4 - 8.2 g/dL Final    AST 03/28/2024 19  9 - 39 U/L Final    Bilirubin, Total 03/28/2024 0.9  0.0 - 1.2 mg/dL Final    ALT 03/28/2024 20  10 - 52 U/L Final    Cholesterol 03/28/2024 116  0 - 199 mg/dL Final    HDL-Cholesterol 03/28/2024 25.8  mg/dL Final    Cholesterol/HDL Ratio 03/28/2024 4.5   Final    LDL Calculated 03/28/2024 65  <=99 mg/dL Final    VLDL 03/28/2024 26  0 - 40 mg/dL Final    Triglycerides 03/28/2024 128  0 - 149 mg/dL Final    Non HDL Cholesterol 03/28/2024 90  0 - 149 mg/dL Final       Radiology: Reviewed imaging in powerchart.  CT watchman full contrast    Result Date: 6/27/2024  Interpreted By:  Javed Morales, STUDY: CT WATCHMAN FULL CONTRAST;  6/27/2024 10:22 am   INDICATION: Signs/Symptoms:A-fib, Post-Watchman.   COMPARISON: None.   ACCESSION NUMBER(S): [id number]   ORDERING CLINICIAN: ASHLEY RAE   TECHNIQUE: Using multi detector CT technology,axial imaging with prospective gating was performed of the chest following the intravenous administration of N/A N/A.   For optimization of anatomic evaluation, multiplanar reconstruction, maximum intensity projections, and advanced 3-D off-line postprocessing were performed on a dedicated stand-alone workstation under the direct supervision of the interpreting physician.   FINDINGS: POTENTIAL STUDY LIMITATIONS:  None   CORONARY ARTERIES:   CORONARY ANATOMY: There is normal origin of the coronary arteries. Right dominant system. Mild coronary artery calcifications are seen. Please note,the study is not optimized for evaluation of coronary arteries.   CARDIAC CHAMBERS: The cardiac chambers demonstrate normal atrioventricular and ventriculoarterial concordance, and systemic and pulmonary venous return.   LEFT ATRIUM: Normal size (26.33-cm2). The patient is status post left atrial appendage closure device placement. Left atrial  appendage appears to be non patent ((ALPHONSO density < 100HU and ALPHONSO density < 25% of that of LA). No evidence of left atrial thrombus.   RIGHT ATRIUM: Normal size (20.04-cm2)   VENTRICLES: The left and right ventricles appear normal in appearance, although accurate size measurements are not possible on systolic phase imaging.   INTERATRIAL SEPTUM: Intact.   INTERVENTRICULAR SEPTUM: Intact.   AORTIC VALVE: The aortic valve is trileaflet in morphology. Aortic root and valve calcifications noted.   MITRAL VALVE: Moderate mitral annular calcifications. Status post mitral valve repair with a mitral annular ring.   THORACIC AORTA: The thoracic aorta normal in course and caliber.There is no evidence for acute aortic pathology, such as dissection, intramural hematoma, or contained rupture. The aortic arch is not included on this examination.   PERICARDIUM: There is no pericardial effusion seen.   CHEST: The trachea and central airways are patent. No endobronchial lesion is seen. The bilateral lungs are clear without evidence of focal consolidation, pleural effusion, or pneumothorax.   No evidence of lymphadenopathy within included mediastinum. Visualized esophagus appears within normal limits as seen.   UPPER ABDOMEN: Small hiatal hernia.     CHEST WALL AND OSSEOUS STRUCTURES: Visualized chest wall is within normal limits. Chronic degenerative changes noted.       1. The patient is status post left atrial appendage closure device placement. The left atrial appendage in non patent 2. No evidence of left atrial thrombus. 3. Mild coronary artery calcifications are seen. Please note,the study is not optimized for evaluation of coronary arteries.   Signed by: Javed Morales 6/27/2024 11:01 AM Dictation workstation:   [id number]      No family history on file.  Social History     Socioeconomic History    Marital status:    Tobacco Use    Smoking status: Former     Current packs/day: 0.00     Average packs/day: 0.5  packs/day for 10.0 years (5.0 ttl pk-yrs)     Types: Cigarettes     Start date:      Quit date:      Years since quittin.5    Smokeless tobacco: Never   Vaping Use    Vaping status: Never Used   Substance and Sexual Activity    Alcohol use: Yes     Alcohol/week: 1.0 standard drink of alcohol     Types: 1 Shots of liquor per week    Drug use: Never     Past Medical History:   Diagnosis Date    Body mass index (BMI) 28.0-28.9, adult     BMI 28.0-28.9,adult    Cerebral infarction, unspecified (Multi) 10/24/2016    Cryptogenic stroke    Gastrojejunal ulcer, unspecified as acute or chronic, without hemorrhage or perforation     Sepsis-related gastrointestinal ulceration    Overweight     Overweight (BMI 25.0-29.9)    Personal history of other diseases of the circulatory system     History of coronary atherosclerosis     Past Surgical History:   Procedure Laterality Date    CARDIAC CATHETERIZATION N/A 2024    Procedure: LAAO (Left Atrial Appendage Occlusion);  Surgeon: Golden Bray MD;  Location: [address] Cardiac Cath Lab;  Service: Cardiovascular;  Laterality: N/A;  Same Day CT 1000    MR HEAD ANGIO WO IV CONTRAST  3/1/2022    MR HEAD ANGIO WO IV CONTRAST 3/1/2022 [facility]    MR NECK ANGIO WO IV CONTRAST  3/1/2022    MR NECK ANGIO WO IV CONTRAST 3/1/2022 [facility]    OTHER SURGICAL HISTORY  2022    Finger amputation    OTHER SURGICAL HISTORY  2022    Colonoscopy       Charting was completed using voice recognition technology and may include unintended errors.    James Gutierrez,   PGY-3, Internal Medicine [facility]

## 2024-08-06 ENCOUNTER — APPOINTMENT (OUTPATIENT)
Dept: RADIOLOGY | Facility: HOSPITAL | Age: 74
End: 2024-08-06
Payer: MEDICARE

## 2024-08-06 ENCOUNTER — HOSPITAL ENCOUNTER (OUTPATIENT)
Dept: RADIOLOGY | Facility: CLINIC | Age: 74
Discharge: HOME | End: 2024-08-06
Payer: MEDICARE

## 2024-08-06 DIAGNOSIS — G40.909 SEIZURE DISORDER (MULTI): ICD-10-CM

## 2024-08-06 DIAGNOSIS — G43.109 MIGRAINE WITH AURA AND WITHOUT STATUS MIGRAINOSUS, NOT INTRACTABLE: ICD-10-CM

## 2024-08-06 PROCEDURE — A9575 INJ GADOTERATE MEGLUMI 0.1ML: HCPCS | Performed by: STUDENT IN AN ORGANIZED HEALTH CARE EDUCATION/TRAINING PROGRAM

## 2024-08-06 PROCEDURE — 70553 MRI BRAIN STEM W/O & W/DYE: CPT | Performed by: RADIOLOGY

## 2024-08-06 PROCEDURE — 2500000004 HC RX 250 GENERAL PHARMACY W/ HCPCS (ALT 636 FOR OP/ED): Performed by: STUDENT IN AN ORGANIZED HEALTH CARE EDUCATION/TRAINING PROGRAM

## 2024-08-06 PROCEDURE — 70553 MRI BRAIN STEM W/O & W/DYE: CPT

## 2024-08-06 RX ORDER — GADOTERATE MEGLUMINE 376.9 MG/ML
0.2 INJECTION INTRAVENOUS
Status: COMPLETED | OUTPATIENT
Start: 2024-08-06 | End: 2024-08-06

## 2024-08-13 ENCOUNTER — TELEPHONE (OUTPATIENT)
Dept: NEUROLOGY | Facility: CLINIC | Age: 74
End: 2024-08-13
Payer: MEDICARE

## 2024-08-13 DIAGNOSIS — G40.909 SEIZURE DISORDER (MULTI): ICD-10-CM

## 2024-08-13 NOTE — TELEPHONE ENCOUNTER
Pt message via [employer] but pt wanted me to add that his sx from the seizures are constant 24 hours a day with no break he states he also needs a refill on his Briviact and that he is really close to running out.

## 2024-08-15 ENCOUNTER — PATIENT MESSAGE (OUTPATIENT)
Dept: PRIMARY CARE | Facility: CLINIC | Age: 74
End: 2024-08-15
Payer: MEDICARE

## 2024-08-15 DIAGNOSIS — I35.0 NONRHEUMATIC AORTIC VALVE STENOSIS: Primary | ICD-10-CM

## 2024-08-15 RX ORDER — AMOXICILLIN 500 MG/1
CAPSULE ORAL
Qty: 4 CAPSULE | Refills: 0 | Status: SHIPPED | OUTPATIENT
Start: 2024-08-15

## 2024-08-21 ENCOUNTER — TELEPHONE (OUTPATIENT)
Dept: NEUROLOGY | Facility: CLINIC | Age: 74
End: 2024-08-21
Payer: MEDICARE

## 2024-08-21 DIAGNOSIS — I10 ESSENTIAL (PRIMARY) HYPERTENSION: ICD-10-CM

## 2024-08-21 DIAGNOSIS — G40.909 SEIZURE DISORDER (MULTI): Primary | ICD-10-CM

## 2024-08-21 DIAGNOSIS — G40.909 SEIZURE DISORDER (MULTI): ICD-10-CM

## 2024-08-21 RX ORDER — ATORVASTATIN CALCIUM 40 MG/1
40 TABLET, FILM COATED ORAL DAILY
Qty: 90 TABLET | Refills: 3 | Status: SHIPPED | OUTPATIENT
Start: 2024-08-21

## 2024-08-21 RX ORDER — ATORVASTATIN CALCIUM 40 MG/1
40 TABLET, FILM COATED ORAL DAILY
Qty: 90 TABLET | Refills: 3 | Status: CANCELLED | OUTPATIENT
Start: 2024-08-21

## 2024-08-21 NOTE — TELEPHONE ENCOUNTER
Pt is dealing directly with the  of briviact. He states that  Rx told him when she sent the last script in, she did not sign off on it. He said that they discussed increasing to 100 mg too but still at 50mg

## 2024-08-21 NOTE — TELEPHONE ENCOUNTER
Pts Briviact and Atorvastatin was sent to the wrong pharmacy. They need to be sent to [facility] pharmacy and I changed it in the system. Please review, thanks

## 2024-08-22 DIAGNOSIS — G40.909 SEIZURE DISORDER (MULTI): ICD-10-CM

## 2024-08-27 ENCOUNTER — APPOINTMENT (OUTPATIENT)
Dept: OTOLARYNGOLOGY | Facility: CLINIC | Age: 74
End: 2024-08-27
Payer: MEDICARE

## 2024-08-27 VITALS
SYSTOLIC BLOOD PRESSURE: 121 MMHG | TEMPERATURE: 97.5 F | HEIGHT: 69 IN | BODY MASS INDEX: 26.43 KG/M2 | DIASTOLIC BLOOD PRESSURE: 81 MMHG

## 2024-08-27 DIAGNOSIS — H93.13 TINNITUS OF BOTH EARS: ICD-10-CM

## 2024-08-27 DIAGNOSIS — H90.3 BILATERAL HIGH FREQUENCY SENSORINEURAL HEARING LOSS: Primary | ICD-10-CM

## 2024-08-27 PROCEDURE — 1159F MED LIST DOCD IN RCRD: CPT | Performed by: OTOLARYNGOLOGY

## 2024-08-27 PROCEDURE — 3074F SYST BP LT 130 MM HG: CPT | Performed by: OTOLARYNGOLOGY

## 2024-08-27 PROCEDURE — 99204 OFFICE O/P NEW MOD 45 MIN: CPT | Performed by: OTOLARYNGOLOGY

## 2024-08-27 PROCEDURE — 1036F TOBACCO NON-USER: CPT | Performed by: OTOLARYNGOLOGY

## 2024-08-27 PROCEDURE — 1157F ADVNC CARE PLAN IN RCRD: CPT | Performed by: OTOLARYNGOLOGY

## 2024-08-27 PROCEDURE — 3079F DIAST BP 80-89 MM HG: CPT | Performed by: OTOLARYNGOLOGY

## 2024-08-27 NOTE — PROGRESS NOTES
Impression:  1. Bilateral high frequency sensorineural hearing loss        2. Tinnitus of both ears             RECOMMENDATIONS/PLAN :  I explained to the patient that he does have a high-frequency sensorineural hearing loss in both ears and it slightly worse in the left ear.  We will follow this closely with a repeat audiogram over the next couple of years or sooner with any sudden changes.  We discussed various masking techniques to help cover up the ringing in his ears.  He is considering hearing aids in the near future as well.      **This electronic medical record note was created with the use of voice recognition software.  Despite proofreading, typographical or grammatical errors may be present that could affect meaning of content **    Subjective   Patient ID:     Waldo Cohen is a 73 y.o. male who presents to the office today complaining of a nonpulsating type ringing in his ears.  He states that this seems to come and go.  He denies any vertigo or fullness or pressure in the ears.  No imbalance or dizziness.  His hearing loss has come on gradually.  No history of middle ear infections or drainage from the ears.    ROS:  A detailed 12 system review of systems is noted on the intake form has been reviewed with the patient with details noted in the HPI and scanned into the patient's medical record.    Objective     Past Medical History:   Diagnosis Date    Body mass index (BMI) 28.0-28.9, adult     BMI 28.0-28.9,adult    Cerebral infarction, unspecified (Multi) 10/24/2016    Cryptogenic stroke    Gastrojejunal ulcer, unspecified as acute or chronic, without hemorrhage or perforation     Sepsis-related gastrointestinal ulceration    Overweight     Overweight (BMI 25.0-29.9)    Personal history of other diseases of the circulatory system     History of coronary atherosclerosis        Past Surgical History:   Procedure Laterality Date    CARDIAC CATHETERIZATION N/A 2/27/2024    Procedure: LAAO (Left Atrial  "Appendage Occlusion);  Surgeon: Golden Bray MD;  Location: [address] Cardiac Cath Lab;  Service: Cardiovascular;  Laterality: N/A;  Same Day CT 1000    MR HEAD ANGIO WO IV CONTRAST  3/1/2022    MR HEAD ANGIO WO IV CONTRAST 3/1/2022 [facility]    MR NECK ANGIO WO IV CONTRAST  3/1/2022    MR NECK ANGIO WO IV CONTRAST 3/1/2022 [facility]    OTHER SURGICAL HISTORY  02/25/2022    Finger amputation    OTHER SURGICAL HISTORY  03/14/2022    Colonoscopy        Allergies   Allergen Reactions    Demerol [Meperidine] GI Upset          Current Outpatient Medications:     aspirin 81 mg EC tablet, Take 1 tablet (81 mg) by mouth once daily., Disp: 90 tablet, Rfl: 3    atorvastatin (Lipitor) 40 mg tablet, TAKE 1 TABLET EVERY DAY, Disp: 90 tablet, Rfl: 3    brivaracetam (Briviact) 100 mg tablet tablet, Take 1 tablet (100 mg) by mouth 2 times a day., Disp: 60 tablet, Rfl: 5    clopidogrel (Plavix) 75 mg tablet, Take 1 tablet (75 mg) by mouth once daily., Disp: 180 tablet, Rfl: 0    multivitamin tablet, Take 1 tablet by mouth once daily., Disp: , Rfl:     pantoprazole (ProtoNix) 40 mg EC tablet, TAKE 1 TABLET EVERY MORNING BEFORE A MEAL, Disp: 90 tablet, Rfl: 1    amoxicillin (Amoxil) 500 mg capsule, Take 4 caps one hour prior to dental procedure, Disp: 4 capsule, Rfl: 0     Tobacco Use: Medium Risk (8/27/2024)    Patient History     Smoking Tobacco Use: Former     Smokeless Tobacco Use: Never     Passive Exposure: Not on file        Alcohol Use: Not At Risk (7/23/2024)    AUDIT-C     Frequency of Alcohol Consumption: Monthly or less     Average Number of Drinks: 1 or 2     Frequency of Binge Drinking: Less than monthly        Social History     Substance and Sexual Activity   Drug Use Never        Physical Exam:  Visit Vitals  /81   Temp 36.4 °C (97.5 °F) (Temporal)   Ht 1.753 m (5' 9\")   BMI 26.43 kg/m²   Smoking Status Former   BSA 1.99 m²      General: Patient is alert, oriented, cooperative in no " apparent distress.  Head: Normocephalic, atraumatic.  Eyes: PERRL, EOMI, Conjunctiva is clear. No nystagmus.  Ears: Right Ear-- Pinna is normal.  External auditory canal is patent. Tympanic membrane is [intact, translucent and has good mobility with my pneumatic otoscope. No effusion].  Mastoid is nontender.  Left ear-- Pinna is normal.  External auditory canal is patent. Tympanic membrane is [intact, translucent and has good mobility with my pneumatic otoscope.  No effusion].  Mastoid is nontender.  Nose: Septum is deviated to the left.  No septal perforation or lesions. No septal hematoma/ seroma.  No signs of bleeding.  Inferior turbinates are mildly swollen.   No evidence of intranasal polyps.  No infectious drainage.  Throat:  Floor of mouth is clear, no masses.  Tongue appears normal, no lesions or masses. Gums, gingiva, buccal mucosa appear pink and moist, no lesions. Teeth are in good repair.  No obvious dental infections.  Peritonsillar regions appear symmetric without swelling.  Hard and soft palate appear normal, no obvious cleft. Uvula is midline.  Oropharynx: No lesions. Retropharyngeal wall is flat.  No active postnasal drip.  Neck: Supple,  no lymphadenopathy.  No masses.  Salivary Glands: Symmetric bilaterally.  No palpable masses.  No evidence of acute infection or salivary stones  Neurologic: Cranial Nerves 2-12 are grossly intact without focal deficits. Cerebellar function testing is normal.     Results:   I reviewed his recent audiogram and the patient does have a bilateral sensorineural hearing loss and it is slightly worse in the left ear.  Tympanic membrane's have normal mobility on tympanometry.  Word recognition scores are 96% in the right ear and 92% in the left ear.  Speech reception threshold is 10 dB in the right ear and 15 dB in the left ear.    Procedure:   []    Hector De Luna, DO

## 2024-09-16 ENCOUNTER — PRE-ADMISSION TESTING (OUTPATIENT)
Dept: PREADMISSION TESTING | Facility: HOSPITAL | Age: 74
End: 2024-09-16
Payer: MEDICARE

## 2024-09-16 ENCOUNTER — LAB (OUTPATIENT)
Dept: LAB | Facility: LAB | Age: 74
End: 2024-09-16
Payer: MEDICARE

## 2024-09-16 VITALS
DIASTOLIC BLOOD PRESSURE: 60 MMHG | RESPIRATION RATE: 14 BRPM | SYSTOLIC BLOOD PRESSURE: 132 MMHG | BODY MASS INDEX: 27.56 KG/M2 | TEMPERATURE: 97.3 F | HEART RATE: 81 BPM | OXYGEN SATURATION: 94 % | HEIGHT: 69 IN | WEIGHT: 186.07 LBS

## 2024-09-16 DIAGNOSIS — M17.11 OSTEOARTHRITIS OF RIGHT KNEE, UNSPECIFIED OSTEOARTHRITIS TYPE: ICD-10-CM

## 2024-09-16 DIAGNOSIS — R73.9 TEMPORARY INCREASE IN BLOOD SUGAR FOLLOWING PROCEDURE: ICD-10-CM

## 2024-09-16 DIAGNOSIS — Z01.818 PREOP EXAMINATION: ICD-10-CM

## 2024-09-16 DIAGNOSIS — Z01.818 PREOP EXAMINATION: Primary | ICD-10-CM

## 2024-09-16 LAB
ANION GAP SERPL CALC-SCNC: 13 MMOL/L (ref 10–20)
BUN SERPL-MCNC: 33 MG/DL (ref 6–23)
CALCIUM SERPL-MCNC: 9.3 MG/DL (ref 8.6–10.3)
CHLORIDE SERPL-SCNC: 102 MMOL/L (ref 98–107)
CO2 SERPL-SCNC: 29 MMOL/L (ref 21–32)
CREAT SERPL-MCNC: 1.37 MG/DL (ref 0.5–1.3)
EGFRCR SERPLBLD CKD-EPI 2021: 55 ML/MIN/1.73M*2
ERYTHROCYTE [DISTWIDTH] IN BLOOD BY AUTOMATED COUNT: 16.1 % (ref 11.5–14.5)
GLUCOSE SERPL-MCNC: 105 MG/DL (ref 74–99)
HCT VFR BLD AUTO: 43.1 % (ref 41–52)
HGB BLD-MCNC: 13.6 G/DL (ref 13.5–17.5)
MCH RBC QN AUTO: 27.7 PG (ref 26–34)
MCHC RBC AUTO-ENTMCNC: 31.6 G/DL (ref 32–36)
MCV RBC AUTO: 88 FL (ref 80–100)
NRBC BLD-RTO: 0 /100 WBCS (ref 0–0)
PLATELET # BLD AUTO: 442 X10*3/UL (ref 150–450)
POTASSIUM SERPL-SCNC: 5.6 MMOL/L (ref 3.5–5.3)
RBC # BLD AUTO: 4.91 X10*6/UL (ref 4.5–5.9)
SODIUM SERPL-SCNC: 138 MMOL/L (ref 136–145)
WBC # BLD AUTO: 11.3 X10*3/UL (ref 4.4–11.3)

## 2024-09-16 PROCEDURE — 85027 COMPLETE CBC AUTOMATED: CPT

## 2024-09-16 PROCEDURE — 87081 CULTURE SCREEN ONLY: CPT | Mod: STJLAB | Performed by: ORTHOPAEDIC SURGERY

## 2024-09-16 PROCEDURE — 36415 COLL VENOUS BLD VENIPUNCTURE: CPT

## 2024-09-16 PROCEDURE — 80048 BASIC METABOLIC PNL TOTAL CA: CPT

## 2024-09-16 PROCEDURE — 93005 ELECTROCARDIOGRAM TRACING: CPT

## 2024-09-16 PROCEDURE — 93010 ELECTROCARDIOGRAM REPORT: CPT | Performed by: INTERNAL MEDICINE

## 2024-09-16 PROCEDURE — 83036 HEMOGLOBIN GLYCOSYLATED A1C: CPT

## 2024-09-16 RX ORDER — CHLORHEXIDINE GLUCONATE ORAL RINSE 1.2 MG/ML
SOLUTION DENTAL
Qty: 473 ML | Refills: 0 | Status: SHIPPED | OUTPATIENT
Start: 2024-09-16

## 2024-09-16 ASSESSMENT — PAIN SCALES - GENERAL: PAINLEVEL_OUTOF10: 0 - NO PAIN

## 2024-09-16 ASSESSMENT — DUKE ACTIVITY SCORE INDEX (DASI)
CAN YOU WALK INDOORS, SUCH AS AROUND YOUR HOUSE: YES
CAN YOU DO YARD WORK LIKE RAKING LEAVES, WEEDING OR PUSHING A MOWER: YES
CAN YOU DO HEAVY WORK AROUND THE HOUSE LIKE SCRUBBING FLOORS OR LIFTING AND MOVING HEAVY FURNITURE: YES
CAN YOU RUN A SHORT DISTANCE: YES
CAN YOU DO MODERATE WORK AROUND THE HOUSE LIKE VACUUMING, SWEEPING FLOORS OR CARRYING GROCERIES: YES
CAN YOU PARTICIPATE IN STRENOUS SPORTS LIKE SWIMMING, SINGLES TENNIS, FOOTBALL, BASKETBALL, OR SKIING: NO
TOTAL_SCORE: 50.7
CAN YOU PARTICIPATE IN MODERATE RECREATIONAL ACTIVITIES LIKE GOLF, BOWLING, DANCING, DOUBLES TENNIS OR THROWING A BASEBALL OR FOOTBALL: YES
CAN YOU DO LIGHT WORK AROUND THE HOUSE LIKE DUSTING OR WASHING DISHES: YES
CAN YOU HAVE SEXUAL RELATIONS: YES
CAN YOU WALK A BLOCK OR TWO ON LEVEL GROUND: YES
CAN YOU CLIMB A FLIGHT OF STAIRS OR WALK UP A HILL: YES
DASI METS SCORE: 9
CAN YOU TAKE CARE OF YOURSELF (EAT, DRESS, BATHE, OR USE TOILET): YES

## 2024-09-16 ASSESSMENT — ACTIVITIES OF DAILY LIVING (ADL): ADL_SCORE: 0

## 2024-09-16 ASSESSMENT — LIFESTYLE VARIABLES: SMOKING_STATUS: NONSMOKER

## 2024-09-16 ASSESSMENT — PAIN - FUNCTIONAL ASSESSMENT: PAIN_FUNCTIONAL_ASSESSMENT: 0-10

## 2024-09-16 NOTE — PREPROCEDURE INSTRUCTIONS
Medication List            Accurate as of September 16, 2024  8:37 AM. Always use your most recent med list.                amoxicillin 500 mg capsule  Commonly known as: Amoxil  Take 4 caps one hour prior to dental procedure  Additional Medication Adjustments for Surgery: Other (Comment)  Notes to patient: N/A     aspirin 81 mg EC tablet  Take 1 tablet (81 mg) by mouth once daily.  Additional Medication Adjustments for Surgery: Other (Comment)  Notes to patient: Per provider recommendations     atorvastatin 40 mg tablet  Commonly known as: Lipitor  TAKE 1 TABLET EVERY DAY  Medication Adjustments for Surgery: Take on the morning of surgery     brivaracetam 100 mg tablet tablet  Commonly known as: Briviact  Take 1 tablet (100 mg) by mouth 2 times a day.  Additional Medication Adjustments for Surgery: Other (Comment)  Notes to patient: Per provider recommendations     chlorhexidine 0.12 % solution  Commonly known as: Peridex  15 milliliter(s) orally once a day for 2 doses 15 ml  the night before surgery and 15 ml morning of surgery - swish for 30 seconds -DO NOT SWALLOW, SPIT OUT  Additional Medication Adjustments for Surgery: Other (Comment)  Notes to patient: As ordered     clopidogrel 75 mg tablet  Commonly known as: Plavix  Take 1 tablet (75 mg) by mouth once daily.  Additional Medication Adjustments for Surgery: Other (Comment)  Notes to patient: Per provider recommendations     multivitamin tablet  Additional Medication Adjustments for Surgery: Take last dose 7 days before surgery     pantoprazole 40 mg EC tablet  Commonly known as: ProtoNix  TAKE 1 TABLET EVERY MORNING BEFORE A MEAL  Medication Adjustments for Surgery: Take on the morning of surgery                         PRE-OPERATIVE INSTRUCTIONS    You will receive notification one business day prior to your procedure to confirm your arrival time. It is important that you answer your phone and/or check your messages during this time. If you do not hear  from the surgery center by 5 pm. the day before your procedure, please call [phone].     Please enter the building through the Outpatient entrance and take the elevator off the lobby to the 2nd floor then check in at the Outpatient Surgery desk on the 2nd floor.    INSTRUCTIONS:  Talk to your surgeon for instructions if you should stop your aspirin, blood thinner, or diabetes medicines.  DO NOT take any multivitamins or over the counter supplements for 7-10 days before surgery.  If not being admitted, you must have an adult immediately available to drive you home after surgery. We also highly recommend you have someone stay with you for the entire day and night of your surgery.  For children having surgery, a parent or legal guardian must accompany them to the surgery center. If this is not possible, please call [phone] to make additional arrangements.  For adults who are unable to consent or make medical decisions for themselves, a legal guardian or Power of  must accompany them to the surgery center. If this is not possible, please call [phone] to make additional arrangements.  Wear comfortable, loose fitting clothing.  All jewelry and piercings must be removed. If you are unable to remove an item or have a dermal piercing, please be sure to tell the nurse when you arrive for surgery.  Nail polish and make-up must be removed.  Avoid smoking or consuming alcohol for 24 hours before surgery.  To help prevent infection, please take a shower/bath and wash your hair the night before and/or morning of surgery (or follow other specific bathing instructions provided).    Preoperative Fasting Guidelines    Why must I stop eating and drinking near surgery time?  With sedation, food or liquid in your stomach can enter your lungs causing serious complications  Increases nausea and vomiting    When do I need to stop eating and drinking before my surgery?  Do not eat any solid food after midnight the  night before your surgery/procedure unless otherwise instructed by your surgeon.   You may have up to 13.5 ounces of clear liquid until TWO hours before your instructed arrival time to the hospital.  This includes water, black tea/coffee, (no milk or cream) apple juice, and electrolyte drinks (Gatorade).   You may chew gum until TWO hours before your surgery/procedure      If applicable, notify your surgeons office immediately of any new skin changes that occur to the surgical limb.      If you have any questions or concerns, please call Pre-Admission Testing at [phone].

## 2024-09-16 NOTE — CPM/PAT H&P
CPM/PAT Evaluation       Name: Waldo Cohen (Waldo Cohen)  /Age: 1950/73 y.o.     In-Person     HPI 72 yo male with history of aortic stenosis, mitral regurg s/p repair(followed by Dr. Weathers), PAF s/p Watchman, essential thrombocytopenia on ASA(followed by Dr. Giron), pulm HTN, TIA, HLD and seizure disorder(left temporal epilepsy) here for preop evaluation for right knee pain and osteoarthritis worse over the past 5 months. History of left TKR.    Past Medical History:   Diagnosis Date    Arrhythmia     afib    Body mass index (BMI) 28.0-28.9, adult     BMI 28.0-28.9,adult    Cerebral infarction, unspecified (Multi) 10/24/2016    Cryptogenic stroke    Gastrojejunal ulcer, unspecified as acute or chronic, without hemorrhage or perforation     Sepsis-related gastrointestinal ulceration    Heart disease     Heart valve disease     Overweight     Overweight (BMI 25.0-29.9)    Personal history of other diseases of the circulatory system     History of coronary atherosclerosis    Seizure disorder (Multi)        Past Surgical History:   Procedure Laterality Date    CARDIAC CATHETERIZATION N/A 2024    Procedure: LAAO (Left Atrial Appendage Occlusion);  Surgeon: Golden Bray MD;  Location: [address] Cardiac Cath Lab;  Service: Cardiovascular;  Laterality: N/A;  Same Day CT 1000    KNEE ARTHROPLASTY      MR HEAD ANGIO WO IV CONTRAST  2022    MR HEAD ANGIO WO IV CONTRAST 3/1/2022 [facility]    MR NECK ANGIO WO IV CONTRAST  2022    MR NECK ANGIO WO IV CONTRAST 3/1/2022 [facility]    OTHER SURGICAL HISTORY  2022    Finger amputation    OTHER SURGICAL HISTORY  2022    Colonoscopy       Patient  reports being sexually active.    Family History   Problem Relation Name Age of Onset    Heart disease Mother      Diabetes Mother      Hypertension Mother      Cancer Father         Allergies   Allergen Reactions    Demerol [Meperidine] GI Upset       Prior to Admission  medications    Medication Sig Start Date End Date Taking? Authorizing Provider   amoxicillin (Amoxil) 500 mg capsule Take 4 caps one hour prior to dental procedure 8/15/24   Christopher Meyers MD   aspirin 81 mg EC tablet Take 1 tablet (81 mg) by mouth once daily. 3/21/24   Christopher Meyers MD   atorvastatin (Lipitor) 40 mg tablet TAKE 1 TABLET EVERY DAY  Patient taking differently: Take 1 tablet (40 mg) by mouth once daily at bedtime. 8/21/24   Pramod Weathers MD   brivaracetam (Briviact) 100 mg tablet tablet Take 1 tablet (100 mg) by mouth 2 times a day. 8/22/24 2/18/25  Debi Gaytan MD   clopidogrel (Plavix) 75 mg tablet Take 1 tablet (75 mg) by mouth once daily. 2/27/24   Rick Huston MD   multivitamin tablet Take 1 tablet by mouth once daily.    Historical Provider, MD   pantoprazole (ProtoNix) 40 mg EC tablet TAKE 1 TABLET EVERY MORNING BEFORE A MEAL 5/1/24   Christopher Meyers MD      Constitutional: Negative for fever, chills, or sweats   ENMT: glasses. Negative for nasal discharge, congestion, ear pain, mouth pain, throat pain   Respiratory: Negative for cough, wheezing, shortness of breath   Cardiac: see hpi. Negative for chest pain, dyspnea on exertion, palpitations   Gastrointestinal: Negative for nausea, vomiting, diarrhea, constipation, abdominal pain  Genitourinary: Negative for dysuria, flank pain, frequency, hematuria  Musculoskeletal: see hpi  Neurological: Negative for dizziness, confusion, headache  Psychiatric: Negative for mood changes   Skin: Negative for itching, rash, ulcer    Hematologic/Lymph: +thrombocytopenia. Negative for bruising, easy bleeding  Allergic/Immunologic: Negative itching, sneezing, swelling     CONSTITUTIONAL - well nourished, well developed, looks like stated age  SKIN - normal skin color and pigmentation, no rash or lesions  HEAD - no trauma, normocephalic  EYES - glasses. pupils are equal and reactive to light, extraocular muscles are intact  ENT - uvula midline,  normal tongue movement and throat normal, no exudate, nasal passage without discharge and patent  NECK - supple without rigidity, full ROM  CHEST - clear to auscultation, no wheezing, no crackles and no rales, good effort  CARDIAC - II/VI SM LSB radiating across chest, regular rate and regular rhythm, no skipped beats,   ABDOMEN - no organomegaly, soft, nontender, nondistended, normal bowel sounds, no guarding/rebound/rigidity   EXTREMITIES - no edema, no deformities  NEUROLOGICAL - normal gait, normal balance, normal motor; alert, oriented and no focal signs  PSYCHIATRIC - alert, pleasant and cordial, age-appropriate  IMMUNOLOGIC - no cervical lymphadenopathy     PAT AIRWAY:   Airway:     Mallampati::  II    Neck ROM::  Full  normal        Visit Vitals  /60   Pulse 81   Temp 36.3 °C (97.3 °F) (Temporal)   Resp 14       EKG: sinus rhythm HR 79 bpm with PAC's    DASI Risk Score      Flowsheet Row Most Recent Value   DASI SCORE 50.7   METS Score (Will be calculated only when all the questions are answered) 9          Caprini DVT Assessment      Flowsheet Row Most Recent Value   DVT Score 9   Current Status Elective major lower extremity arthroplasty   History Prior major surgery   Age 60-75 years   BMI 30 or less          Modified Frailty Index      Flowsheet Row Most Recent Value   Modified Frailty Index Calculator .1818          CHADS2 Stroke Risk  Current as of 26 minutes ago        5.9% 3 to 100%: High Risk   2 to < 3%: Medium Risk   0 to < 2%: Low Risk     Last Change: N/A          This score determines the patient's risk of having a stroke if the patient has atrial fibrillation.          Points Metrics   0 Has Congestive Heart Failure:  No     Patients with congestive heart failure get 1 point.    Current as of 26 minutes ago   1 Has Hypertension:  Yes     Patients with hypertension get 1 point.    Current as of 26 minutes ago   0 Age:  73     Patients who are 75 years of age or older get 1 point.     Current as of 26 minutes ago   0 Has Diabetes:  No     Patients with diabetes get 1 point.    Current as of 26 minutes ago   2 Had Stroke:  Yes   Had TIA:  Yes  Had Thromboembolism:  No     Patients who have had a stroke, TIA, or thromboembolism get 2 points.    Current as of 26 minutes ago             Revised Cardiac Risk Index      Flowsheet Row Most Recent Value   Revised Cardiac Risk Calculator 1          Apfel Simplified Score      Flowsheet Row Most Recent Value   Apfel Simplified Score Calculator 2          Risk Analysis Index Results This Encounter         9/16/2024  0826             MULLER Cancer History: Patient does not indicate history of cancer    Total Risk Analysis Index Score Without Cancer: 25    Total Risk Analysis Index Score: 25          Stop Bang Score      Flowsheet Row Most Recent Value   Do you snore loudly? 0   Do you often feel tired or fatigued after your sleep? 0   Has anyone ever observed you stop breathing in your sleep? 0   Do you have or are you being treated for high blood pressure? 0   Recent BMI (Calculated) 27.5   Is BMI greater than 35 kg/m2? 0=No   Age older than 50 years old? 1=Yes   Is your neck circumference greater than 17 inches (Male) or 16 inches (Female)? 0   Gender - Male 1=Yes   STOP-BANG Total Score 2            Assessment and Plan:   74 yo male scheduled for right TKR with Dr. Plummer 9/30/2024. Labs/EKG ordered. Anesthesia to review.    See risk scores as previously documented.

## 2024-09-16 NOTE — H&P (VIEW-ONLY)
CPM/PAT Evaluation       Name: Waldo Cohen (Waldo Cohen)  /Age: 1950/73 y.o.     In-Person     HPI 72 yo male with history of aortic stenosis, mitral regurg s/p repair(followed by Dr. Weathers), PAF s/p Watchman, essential thrombocytopenia on ASA(followed by Dr. Giron), pulm HTN, TIA, HLD and seizure disorder(left temporal epilepsy) here for preop evaluation for right knee pain and osteoarthritis worse over the past 5 months. History of left TKR.    Past Medical History:   Diagnosis Date    Arrhythmia     afib    Body mass index (BMI) 28.0-28.9, adult     BMI 28.0-28.9,adult    Cerebral infarction, unspecified (Multi) 10/24/2016    Cryptogenic stroke    Gastrojejunal ulcer, unspecified as acute or chronic, without hemorrhage or perforation     Sepsis-related gastrointestinal ulceration    Heart disease     Heart valve disease     Overweight     Overweight (BMI 25.0-29.9)    Personal history of other diseases of the circulatory system     History of coronary atherosclerosis    Seizure disorder (Multi)        Past Surgical History:   Procedure Laterality Date    CARDIAC CATHETERIZATION N/A 2024    Procedure: LAAO (Left Atrial Appendage Occlusion);  Surgeon: Golden Bray MD;  Location: [address] Cardiac Cath Lab;  Service: Cardiovascular;  Laterality: N/A;  Same Day CT 1000    KNEE ARTHROPLASTY      MR HEAD ANGIO WO IV CONTRAST  2022    MR HEAD ANGIO WO IV CONTRAST 3/1/2022 [facility]    MR NECK ANGIO WO IV CONTRAST  2022    MR NECK ANGIO WO IV CONTRAST 3/1/2022 [facility]    OTHER SURGICAL HISTORY  2022    Finger amputation    OTHER SURGICAL HISTORY  2022    Colonoscopy       Patient  reports being sexually active.    Family History   Problem Relation Name Age of Onset    Heart disease Mother      Diabetes Mother      Hypertension Mother      Cancer Father         Allergies   Allergen Reactions    Demerol [Meperidine] GI Upset       Prior to Admission  medications    Medication Sig Start Date End Date Taking? Authorizing Provider   amoxicillin (Amoxil) 500 mg capsule Take 4 caps one hour prior to dental procedure 8/15/24   Christopher Meyers MD   aspirin 81 mg EC tablet Take 1 tablet (81 mg) by mouth once daily. 3/21/24   Christopher Meyers MD   atorvastatin (Lipitor) 40 mg tablet TAKE 1 TABLET EVERY DAY  Patient taking differently: Take 1 tablet (40 mg) by mouth once daily at bedtime. 8/21/24   Pramod Weathers MD   brivaracetam (Briviact) 100 mg tablet tablet Take 1 tablet (100 mg) by mouth 2 times a day. 8/22/24 2/18/25  Debi Gaytan MD   clopidogrel (Plavix) 75 mg tablet Take 1 tablet (75 mg) by mouth once daily. 2/27/24   Rick Huston MD   multivitamin tablet Take 1 tablet by mouth once daily.    Historical Provider, MD   pantoprazole (ProtoNix) 40 mg EC tablet TAKE 1 TABLET EVERY MORNING BEFORE A MEAL 5/1/24   Christopher Meyers MD      Constitutional: Negative for fever, chills, or sweats   ENMT: glasses. Negative for nasal discharge, congestion, ear pain, mouth pain, throat pain   Respiratory: Negative for cough, wheezing, shortness of breath   Cardiac: see hpi. Negative for chest pain, dyspnea on exertion, palpitations   Gastrointestinal: Negative for nausea, vomiting, diarrhea, constipation, abdominal pain  Genitourinary: Negative for dysuria, flank pain, frequency, hematuria  Musculoskeletal: see hpi  Neurological: Negative for dizziness, confusion, headache  Psychiatric: Negative for mood changes   Skin: Negative for itching, rash, ulcer    Hematologic/Lymph: +thrombocytopenia. Negative for bruising, easy bleeding  Allergic/Immunologic: Negative itching, sneezing, swelling     CONSTITUTIONAL - well nourished, well developed, looks like stated age  SKIN - normal skin color and pigmentation, no rash or lesions  HEAD - no trauma, normocephalic  EYES - glasses. pupils are equal and reactive to light, extraocular muscles are intact  ENT - uvula midline,  normal tongue movement and throat normal, no exudate, nasal passage without discharge and patent  NECK - supple without rigidity, full ROM  CHEST - clear to auscultation, no wheezing, no crackles and no rales, good effort  CARDIAC - II/VI SM LSB radiating across chest, regular rate and regular rhythm, no skipped beats,   ABDOMEN - no organomegaly, soft, nontender, nondistended, normal bowel sounds, no guarding/rebound/rigidity   EXTREMITIES - no edema, no deformities  NEUROLOGICAL - normal gait, normal balance, normal motor; alert, oriented and no focal signs  PSYCHIATRIC - alert, pleasant and cordial, age-appropriate  IMMUNOLOGIC - no cervical lymphadenopathy     PAT AIRWAY:   Airway:     Mallampati::  II    Neck ROM::  Full  normal        Visit Vitals  /60   Pulse 81   Temp 36.3 °C (97.3 °F) (Temporal)   Resp 14       EKG: sinus rhythm HR 79 bpm with PAC's    DASI Risk Score      Flowsheet Row Most Recent Value   DASI SCORE 50.7   METS Score (Will be calculated only when all the questions are answered) 9          Caprini DVT Assessment      Flowsheet Row Most Recent Value   DVT Score 9   Current Status Elective major lower extremity arthroplasty   History Prior major surgery   Age 60-75 years   BMI 30 or less          Modified Frailty Index      Flowsheet Row Most Recent Value   Modified Frailty Index Calculator .1818          CHADS2 Stroke Risk  Current as of 26 minutes ago        5.9% 3 to 100%: High Risk   2 to < 3%: Medium Risk   0 to < 2%: Low Risk     Last Change: N/A          This score determines the patient's risk of having a stroke if the patient has atrial fibrillation.          Points Metrics   0 Has Congestive Heart Failure:  No     Patients with congestive heart failure get 1 point.    Current as of 26 minutes ago   1 Has Hypertension:  Yes     Patients with hypertension get 1 point.    Current as of 26 minutes ago   0 Age:  73     Patients who are 75 years of age or older get 1 point.     Current as of 26 minutes ago   0 Has Diabetes:  No     Patients with diabetes get 1 point.    Current as of 26 minutes ago   2 Had Stroke:  Yes   Had TIA:  Yes  Had Thromboembolism:  No     Patients who have had a stroke, TIA, or thromboembolism get 2 points.    Current as of 26 minutes ago             Revised Cardiac Risk Index      Flowsheet Row Most Recent Value   Revised Cardiac Risk Calculator 1          Apfel Simplified Score      Flowsheet Row Most Recent Value   Apfel Simplified Score Calculator 2          Risk Analysis Index Results This Encounter         9/16/2024  0826             MULLER Cancer History: Patient does not indicate history of cancer    Total Risk Analysis Index Score Without Cancer: 25    Total Risk Analysis Index Score: 25          Stop Bang Score      Flowsheet Row Most Recent Value   Do you snore loudly? 0   Do you often feel tired or fatigued after your sleep? 0   Has anyone ever observed you stop breathing in your sleep? 0   Do you have or are you being treated for high blood pressure? 0   Recent BMI (Calculated) 27.5   Is BMI greater than 35 kg/m2? 0=No   Age older than 50 years old? 1=Yes   Is your neck circumference greater than 17 inches (Male) or 16 inches (Female)? 0   Gender - Male 1=Yes   STOP-BANG Total Score 2            Assessment and Plan:   72 yo male scheduled for right TKR with Dr. Plummer 9/30/2024. Labs/EKG ordered. Anesthesia to review.    See risk scores as previously documented.

## 2024-09-17 LAB
ATRIAL RATE: 79 BPM
EST. AVERAGE GLUCOSE BLD GHB EST-MCNC: 108 MG/DL
HBA1C MFR BLD: 5.4 %
P AXIS: 41 DEGREES
P OFFSET: 171 MS
P ONSET: 127 MS
PR INTERVAL: 192 MS
Q ONSET: 223 MS
QRS COUNT: 13 BEATS
QRS DURATION: 92 MS
QT INTERVAL: 378 MS
QTC CALCULATION(BAZETT): 433 MS
QTC FREDERICIA: 414 MS
R AXIS: 27 DEGREES
T AXIS: 47 DEGREES
T OFFSET: 412 MS
VENTRICULAR RATE: 79 BPM

## 2024-09-18 LAB — STAPHYLOCOCCUS SPEC CULT: NORMAL

## 2024-09-29 ENCOUNTER — ANESTHESIA EVENT (OUTPATIENT)
Dept: OPERATING ROOM | Facility: HOSPITAL | Age: 74
End: 2024-09-29
Payer: MEDICARE

## 2024-09-30 ENCOUNTER — APPOINTMENT (OUTPATIENT)
Dept: RADIOLOGY | Facility: HOSPITAL | Age: 74
End: 2024-09-30
Payer: MEDICARE

## 2024-09-30 ENCOUNTER — ANESTHESIA (OUTPATIENT)
Dept: OPERATING ROOM | Facility: HOSPITAL | Age: 74
End: 2024-09-30
Payer: MEDICARE

## 2024-09-30 ENCOUNTER — HOSPITAL ENCOUNTER (OUTPATIENT)
Facility: HOSPITAL | Age: 74
Discharge: HOME HEALTH | End: 2024-10-01
Attending: ORTHOPAEDIC SURGERY | Admitting: ORTHOPAEDIC SURGERY
Payer: MEDICARE

## 2024-09-30 DIAGNOSIS — Z96.651 STATUS POST RIGHT KNEE REPLACEMENT: Primary | ICD-10-CM

## 2024-09-30 DIAGNOSIS — I25.10 CORONARY ARTERY DISEASE INVOLVING NATIVE CORONARY ARTERY OF NATIVE HEART WITHOUT ANGINA PECTORIS: ICD-10-CM

## 2024-09-30 PROBLEM — M17.11 OSTEOARTHRITIS OF RIGHT KNEE, UNSPECIFIED OSTEOARTHRITIS TYPE: Status: ACTIVE | Noted: 2024-09-30

## 2024-09-30 PROCEDURE — 97161 PT EVAL LOW COMPLEX 20 MIN: CPT | Mod: GP

## 2024-09-30 PROCEDURE — 2720000007 HC OR 272 NO HCPCS: Performed by: ORTHOPAEDIC SURGERY

## 2024-09-30 PROCEDURE — 7100000001 HC RECOVERY ROOM TIME - INITIAL BASE CHARGE: Performed by: ORTHOPAEDIC SURGERY

## 2024-09-30 PROCEDURE — 3700000001 HC GENERAL ANESTHESIA TIME - INITIAL BASE CHARGE: Performed by: ORTHOPAEDIC SURGERY

## 2024-09-30 PROCEDURE — 3600000018 HC OR TIME - INITIAL BASE CHARGE - PROCEDURE LEVEL SIX: Performed by: ORTHOPAEDIC SURGERY

## 2024-09-30 PROCEDURE — 3600000017 HC OR TIME - EACH INCREMENTAL 1 MINUTE - PROCEDURE LEVEL SIX: Performed by: ORTHOPAEDIC SURGERY

## 2024-09-30 PROCEDURE — 2500000005 HC RX 250 GENERAL PHARMACY W/O HCPCS: Performed by: ORTHOPAEDIC SURGERY

## 2024-09-30 PROCEDURE — 97530 THERAPEUTIC ACTIVITIES: CPT | Mod: GP

## 2024-09-30 PROCEDURE — 99100 ANES PT EXTEME AGE<1 YR&>70: CPT | Performed by: STUDENT IN AN ORGANIZED HEALTH CARE EDUCATION/TRAINING PROGRAM

## 2024-09-30 PROCEDURE — 2780000003 HC OR 278 NO HCPCS: Performed by: ORTHOPAEDIC SURGERY

## 2024-09-30 PROCEDURE — 2500000004 HC RX 250 GENERAL PHARMACY W/ HCPCS (ALT 636 FOR OP/ED): Performed by: ANESTHESIOLOGIST ASSISTANT

## 2024-09-30 PROCEDURE — 71045 X-RAY EXAM CHEST 1 VIEW: CPT | Performed by: STUDENT IN AN ORGANIZED HEALTH CARE EDUCATION/TRAINING PROGRAM

## 2024-09-30 PROCEDURE — 2500000004 HC RX 250 GENERAL PHARMACY W/ HCPCS (ALT 636 FOR OP/ED): Mod: JZ | Performed by: ORTHOPAEDIC SURGERY

## 2024-09-30 PROCEDURE — 2500000002 HC RX 250 W HCPCS SELF ADMINISTERED DRUGS (ALT 637 FOR MEDICARE OP, ALT 636 FOR OP/ED): Performed by: ORTHOPAEDIC SURGERY

## 2024-09-30 PROCEDURE — 2500000001 HC RX 250 WO HCPCS SELF ADMINISTERED DRUGS (ALT 637 FOR MEDICARE OP): Performed by: ORTHOPAEDIC SURGERY

## 2024-09-30 PROCEDURE — 2500000004 HC RX 250 GENERAL PHARMACY W/ HCPCS (ALT 636 FOR OP/ED): Performed by: STUDENT IN AN ORGANIZED HEALTH CARE EDUCATION/TRAINING PROGRAM

## 2024-09-30 PROCEDURE — C1776 JOINT DEVICE (IMPLANTABLE): HCPCS | Performed by: ORTHOPAEDIC SURGERY

## 2024-09-30 PROCEDURE — 3700000002 HC GENERAL ANESTHESIA TIME - EACH INCREMENTAL 1 MINUTE: Performed by: ORTHOPAEDIC SURGERY

## 2024-09-30 PROCEDURE — 7100000002 HC RECOVERY ROOM TIME - EACH INCREMENTAL 1 MINUTE: Performed by: ORTHOPAEDIC SURGERY

## 2024-09-30 PROCEDURE — 2500000004 HC RX 250 GENERAL PHARMACY W/ HCPCS (ALT 636 FOR OP/ED): Performed by: ORTHOPAEDIC SURGERY

## 2024-09-30 PROCEDURE — 71045 X-RAY EXAM CHEST 1 VIEW: CPT

## 2024-09-30 PROCEDURE — 7100000011 HC EXTENDED STAY RECOVERY HOURLY - NURSING UNIT

## 2024-09-30 PROCEDURE — A27447 PR TOTAL KNEE ARTHROPLASTY: Performed by: ANESTHESIOLOGIST ASSISTANT

## 2024-09-30 PROCEDURE — A27447 PR TOTAL KNEE ARTHROPLASTY: Performed by: STUDENT IN AN ORGANIZED HEALTH CARE EDUCATION/TRAINING PROGRAM

## 2024-09-30 DEVICE — PATELLA
Type: IMPLANTABLE DEVICE | Site: KNEE | Status: FUNCTIONAL
Brand: TRIATHLON

## 2024-09-30 DEVICE — CRUCIATE RETAINING FEMORAL
Type: IMPLANTABLE DEVICE | Site: KNEE | Status: FUNCTIONAL
Brand: TRIATHLON

## 2024-09-30 DEVICE — TIBIAL BEARING INSERT - CR
Type: IMPLANTABLE DEVICE | Site: KNEE | Status: FUNCTIONAL
Brand: TRIATHLON

## 2024-09-30 DEVICE — TIBIAL COMPONENT
Type: IMPLANTABLE DEVICE | Site: KNEE | Status: FUNCTIONAL
Brand: TRIATHLON

## 2024-09-30 DEVICE — DISPOSABLE FLUTED HEADLESS PINS
Type: IMPLANTABLE DEVICE | Site: KNEE | Status: NON-FUNCTIONAL
Brand: INSTRUMENT

## 2024-09-30 RX ORDER — GABAPENTIN 300 MG/1
600 CAPSULE ORAL ONCE
Status: COMPLETED | OUTPATIENT
Start: 2024-09-30 | End: 2024-09-30

## 2024-09-30 RX ORDER — ACETAMINOPHEN 325 MG/1
650 TABLET ORAL EVERY 6 HOURS SCHEDULED
Status: DISCONTINUED | OUTPATIENT
Start: 2024-09-30 | End: 2024-10-01 | Stop reason: HOSPADM

## 2024-09-30 RX ORDER — SODIUM CHLORIDE 9 MG/ML
100 INJECTION, SOLUTION INTRAVENOUS CONTINUOUS
Status: ACTIVE | OUTPATIENT
Start: 2024-09-30 | End: 2024-10-01

## 2024-09-30 RX ORDER — OXYCODONE HYDROCHLORIDE 5 MG/1
5 TABLET ORAL EVERY 6 HOURS PRN
Status: DISCONTINUED | OUTPATIENT
Start: 2024-09-30 | End: 2024-09-30

## 2024-09-30 RX ORDER — POLYETHYLENE GLYCOL 3350 17 G/17G
17 POWDER, FOR SOLUTION ORAL DAILY
Status: DISCONTINUED | OUTPATIENT
Start: 2024-09-30 | End: 2024-10-01 | Stop reason: HOSPADM

## 2024-09-30 RX ORDER — ONDANSETRON HYDROCHLORIDE 2 MG/ML
4 INJECTION, SOLUTION INTRAVENOUS ONCE AS NEEDED
Status: DISCONTINUED | OUTPATIENT
Start: 2024-09-30 | End: 2024-09-30 | Stop reason: HOSPADM

## 2024-09-30 RX ORDER — NALOXONE HYDROCHLORIDE 0.4 MG/ML
0.2 INJECTION, SOLUTION INTRAMUSCULAR; INTRAVENOUS; SUBCUTANEOUS EVERY 5 MIN PRN
Status: DISCONTINUED | OUTPATIENT
Start: 2024-09-30 | End: 2024-10-01 | Stop reason: HOSPADM

## 2024-09-30 RX ORDER — MEPERIDINE HYDROCHLORIDE 50 MG/ML
12.5 INJECTION INTRAMUSCULAR; INTRAVENOUS; SUBCUTANEOUS EVERY 10 MIN PRN
Status: DISCONTINUED | OUTPATIENT
Start: 2024-09-30 | End: 2024-09-30 | Stop reason: HOSPADM

## 2024-09-30 RX ORDER — SODIUM CHLORIDE, SODIUM LACTATE, POTASSIUM CHLORIDE, CALCIUM CHLORIDE 600; 310; 30; 20 MG/100ML; MG/100ML; MG/100ML; MG/100ML
100 INJECTION, SOLUTION INTRAVENOUS CONTINUOUS
Status: DISCONTINUED | OUTPATIENT
Start: 2024-09-30 | End: 2024-10-01 | Stop reason: HOSPADM

## 2024-09-30 RX ORDER — OXYCODONE HYDROCHLORIDE 5 MG/1
5 TABLET ORAL EVERY 4 HOURS PRN
Status: DISCONTINUED | OUTPATIENT
Start: 2024-09-30 | End: 2024-09-30 | Stop reason: HOSPADM

## 2024-09-30 RX ORDER — BUPIVACAINE HYDROCHLORIDE 7.5 MG/ML
INJECTION, SOLUTION EPIDURAL; RETROBULBAR AS NEEDED
Status: DISCONTINUED | OUTPATIENT
Start: 2024-09-30 | End: 2024-09-30

## 2024-09-30 RX ORDER — ALBUTEROL SULFATE 0.83 MG/ML
2.5 SOLUTION RESPIRATORY (INHALATION) ONCE AS NEEDED
Status: DISCONTINUED | OUTPATIENT
Start: 2024-09-30 | End: 2024-09-30 | Stop reason: HOSPADM

## 2024-09-30 RX ORDER — PROPOFOL 10 MG/ML
INJECTION, EMULSION INTRAVENOUS CONTINUOUS PRN
Status: DISCONTINUED | OUTPATIENT
Start: 2024-09-30 | End: 2024-09-30

## 2024-09-30 RX ORDER — ONDANSETRON HYDROCHLORIDE 2 MG/ML
4 INJECTION, SOLUTION INTRAVENOUS EVERY 8 HOURS PRN
Status: DISCONTINUED | OUTPATIENT
Start: 2024-09-30 | End: 2024-10-01 | Stop reason: HOSPADM

## 2024-09-30 RX ORDER — SODIUM CHLORIDE 9 MG/ML
20 INJECTION, SOLUTION INTRAVENOUS CONTINUOUS
Status: DISCONTINUED | OUTPATIENT
Start: 2024-09-30 | End: 2024-10-01 | Stop reason: HOSPADM

## 2024-09-30 RX ORDER — TRANEXAMIC ACID 650 MG/1
1300 TABLET ORAL ONCE
Status: COMPLETED | OUTPATIENT
Start: 2024-09-30 | End: 2024-09-30

## 2024-09-30 RX ORDER — MIDAZOLAM HYDROCHLORIDE 1 MG/ML
1 INJECTION, SOLUTION INTRAMUSCULAR; INTRAVENOUS ONCE AS NEEDED
Status: DISCONTINUED | OUTPATIENT
Start: 2024-09-30 | End: 2024-09-30 | Stop reason: HOSPADM

## 2024-09-30 RX ORDER — LIDOCAINE HYDROCHLORIDE 10 MG/ML
0.1 INJECTION, SOLUTION INFILTRATION; PERINEURAL ONCE
Status: DISCONTINUED | OUTPATIENT
Start: 2024-09-30 | End: 2024-09-30 | Stop reason: HOSPADM

## 2024-09-30 RX ORDER — OXYCODONE HYDROCHLORIDE 10 MG/1
10 TABLET ORAL EVERY 4 HOURS PRN
Status: DISCONTINUED | OUTPATIENT
Start: 2024-09-30 | End: 2024-10-01 | Stop reason: HOSPADM

## 2024-09-30 RX ORDER — CLOPIDOGREL BISULFATE 75 MG/1
75 TABLET ORAL DAILY
Status: DISCONTINUED | OUTPATIENT
Start: 2024-10-01 | End: 2024-10-01 | Stop reason: HOSPADM

## 2024-09-30 RX ORDER — ONDANSETRON 4 MG/1
4 TABLET, FILM COATED ORAL EVERY 8 HOURS PRN
Status: DISCONTINUED | OUTPATIENT
Start: 2024-09-30 | End: 2024-10-01 | Stop reason: HOSPADM

## 2024-09-30 RX ORDER — FENTANYL CITRATE 50 UG/ML
25 INJECTION, SOLUTION INTRAMUSCULAR; INTRAVENOUS EVERY 5 MIN PRN
Status: DISCONTINUED | OUTPATIENT
Start: 2024-09-30 | End: 2024-09-30 | Stop reason: HOSPADM

## 2024-09-30 RX ORDER — CEFAZOLIN SODIUM 2 G/100ML
2 INJECTION, SOLUTION INTRAVENOUS EVERY 8 HOURS
Status: COMPLETED | OUTPATIENT
Start: 2024-09-30 | End: 2024-10-01

## 2024-09-30 RX ORDER — CEFAZOLIN SODIUM 2 G/100ML
2 INJECTION, SOLUTION INTRAVENOUS ONCE
Status: COMPLETED | OUTPATIENT
Start: 2024-09-30 | End: 2024-09-30

## 2024-09-30 RX ORDER — HYDROMORPHONE HYDROCHLORIDE 1 MG/ML
0.5 INJECTION, SOLUTION INTRAMUSCULAR; INTRAVENOUS; SUBCUTANEOUS EVERY 4 HOURS PRN
Status: DISCONTINUED | OUTPATIENT
Start: 2024-09-30 | End: 2024-09-30

## 2024-09-30 RX ORDER — MIDAZOLAM HYDROCHLORIDE 1 MG/ML
INJECTION, SOLUTION INTRAMUSCULAR; INTRAVENOUS AS NEEDED
Status: DISCONTINUED | OUTPATIENT
Start: 2024-09-30 | End: 2024-09-30

## 2024-09-30 RX ORDER — LABETALOL HYDROCHLORIDE 5 MG/ML
5 INJECTION, SOLUTION INTRAVENOUS ONCE AS NEEDED
Status: DISCONTINUED | OUTPATIENT
Start: 2024-09-30 | End: 2024-09-30 | Stop reason: HOSPADM

## 2024-09-30 RX ORDER — CELECOXIB 200 MG/1
200 CAPSULE ORAL ONCE
Status: COMPLETED | OUTPATIENT
Start: 2024-09-30 | End: 2024-09-30

## 2024-09-30 RX ORDER — OXYCODONE HYDROCHLORIDE 5 MG/1
5 TABLET ORAL EVERY 4 HOURS PRN
Status: DISCONTINUED | OUTPATIENT
Start: 2024-09-30 | End: 2024-10-01 | Stop reason: HOSPADM

## 2024-09-30 RX ORDER — DIPHENHYDRAMINE HYDROCHLORIDE 50 MG/ML
12.5 INJECTION INTRAMUSCULAR; INTRAVENOUS EVERY 6 HOURS PRN
Status: DISCONTINUED | OUTPATIENT
Start: 2024-09-30 | End: 2024-10-01 | Stop reason: HOSPADM

## 2024-09-30 RX ORDER — HYDROMORPHONE HYDROCHLORIDE 1 MG/ML
0.5 INJECTION, SOLUTION INTRAMUSCULAR; INTRAVENOUS; SUBCUTANEOUS EVERY 4 HOURS PRN
Status: DISCONTINUED | OUTPATIENT
Start: 2024-09-30 | End: 2024-10-01 | Stop reason: HOSPADM

## 2024-09-30 RX ORDER — ACETAMINOPHEN 325 MG/1
975 TABLET ORAL ONCE
Status: COMPLETED | OUTPATIENT
Start: 2024-09-30 | End: 2024-09-30

## 2024-09-30 SDOH — ECONOMIC STABILITY: FOOD INSECURITY: WITHIN THE PAST 12 MONTHS, YOU WORRIED THAT YOUR FOOD WOULD RUN OUT BEFORE YOU GOT MONEY TO BUY MORE.: PATIENT DECLINED

## 2024-09-30 SDOH — ECONOMIC STABILITY: FOOD INSECURITY: WITHIN THE PAST 12 MONTHS, THE FOOD YOU BOUGHT JUST DIDN'T LAST AND YOU DIDN'T HAVE MONEY TO GET MORE.: PATIENT DECLINED

## 2024-09-30 SDOH — SOCIAL STABILITY: SOCIAL INSECURITY
WITHIN THE LAST YEAR, HAVE TO BEEN RAPED OR FORCED TO HAVE ANY KIND OF SEXUAL ACTIVITY BY YOUR PARTNER OR EX-PARTNER?: NO

## 2024-09-30 SDOH — SOCIAL STABILITY: SOCIAL INSECURITY: HAS ANYONE EVER THREATENED TO HURT YOUR FAMILY OR YOUR PETS?: NO

## 2024-09-30 SDOH — SOCIAL STABILITY: SOCIAL INSECURITY: WITHIN THE LAST YEAR, HAVE YOU BEEN HUMILIATED OR EMOTIONALLY ABUSED IN OTHER WAYS BY YOUR PARTNER OR EX-PARTNER?: NO

## 2024-09-30 SDOH — SOCIAL STABILITY: SOCIAL INSECURITY: ARE YOU OR HAVE YOU BEEN THREATENED OR ABUSED PHYSICALLY, EMOTIONALLY, OR SEXUALLY BY ANYONE?: NO

## 2024-09-30 SDOH — SOCIAL STABILITY: SOCIAL INSECURITY
WITHIN THE LAST YEAR, HAVE YOU BEEN KICKED, HIT, SLAPPED, OR OTHERWISE PHYSICALLY HURT BY YOUR PARTNER OR EX-PARTNER?: NO

## 2024-09-30 SDOH — SOCIAL STABILITY: SOCIAL INSECURITY: ABUSE: ADULT

## 2024-09-30 SDOH — SOCIAL STABILITY: SOCIAL INSECURITY: DO YOU FEEL ANYONE HAS EXPLOITED OR TAKEN ADVANTAGE OF YOU FINANCIALLY OR OF YOUR PERSONAL PROPERTY?: NO

## 2024-09-30 SDOH — SOCIAL STABILITY: SOCIAL INSECURITY: WITHIN THE LAST YEAR, HAVE YOU BEEN AFRAID OF YOUR PARTNER OR EX-PARTNER?: NO

## 2024-09-30 SDOH — ECONOMIC STABILITY: TRANSPORTATION INSECURITY
IN THE PAST 12 MONTHS, HAS THE LACK OF TRANSPORTATION KEPT YOU FROM MEDICAL APPOINTMENTS OR FROM GETTING MEDICATIONS?: PATIENT DECLINED

## 2024-09-30 SDOH — SOCIAL STABILITY: SOCIAL INSECURITY: DOES ANYONE TRY TO KEEP YOU FROM HAVING/CONTACTING OTHER FRIENDS OR DOING THINGS OUTSIDE YOUR HOME?: NO

## 2024-09-30 SDOH — ECONOMIC STABILITY: INCOME INSECURITY: IN THE LAST 12 MONTHS, WAS THERE A TIME WHEN YOU WERE NOT ABLE TO PAY THE MORTGAGE OR RENT ON TIME?: PATIENT DECLINED

## 2024-09-30 SDOH — SOCIAL STABILITY: SOCIAL INSECURITY: HAVE YOU HAD THOUGHTS OF HARMING ANYONE ELSE?: NO

## 2024-09-30 SDOH — SOCIAL STABILITY: SOCIAL INSECURITY: ARE THERE ANY APPARENT SIGNS OF INJURIES/BEHAVIORS THAT COULD BE RELATED TO ABUSE/NEGLECT?: NO

## 2024-09-30 SDOH — ECONOMIC STABILITY: INCOME INSECURITY: HOW HARD IS IT FOR YOU TO PAY FOR THE VERY BASICS LIKE FOOD, HOUSING, MEDICAL CARE, AND HEATING?: PATIENT DECLINED

## 2024-09-30 SDOH — ECONOMIC STABILITY: HOUSING INSECURITY: AT ANY TIME IN THE PAST 12 MONTHS, WERE YOU HOMELESS OR LIVING IN A SHELTER (INCLUDING NOW)?: PATIENT DECLINED

## 2024-09-30 SDOH — ECONOMIC STABILITY: TRANSPORTATION INSECURITY
IN THE PAST 12 MONTHS, HAS LACK OF TRANSPORTATION KEPT YOU FROM MEETINGS, WORK, OR FROM GETTING THINGS NEEDED FOR DAILY LIVING?: PATIENT DECLINED

## 2024-09-30 SDOH — ECONOMIC STABILITY: INCOME INSECURITY
IN THE PAST 12 MONTHS, HAS THE ELECTRIC, GAS, OIL, OR WATER COMPANY THREATENED TO SHUT OFF SERVICE IN YOUR HOME?: PATIENT DECLINED

## 2024-09-30 SDOH — ECONOMIC STABILITY: HOUSING INSECURITY: IN THE PAST 12 MONTHS, HOW MANY TIMES HAVE YOU MOVED WHERE YOU WERE LIVING?: 1

## 2024-09-30 SDOH — SOCIAL STABILITY: SOCIAL INSECURITY: WERE YOU ABLE TO COMPLETE ALL THE BEHAVIORAL HEALTH SCREENINGS?: YES

## 2024-09-30 SDOH — HEALTH STABILITY: MENTAL HEALTH: CURRENT SMOKER: 0

## 2024-09-30 SDOH — SOCIAL STABILITY: SOCIAL INSECURITY: DO YOU FEEL UNSAFE GOING BACK TO THE PLACE WHERE YOU ARE LIVING?: NO

## 2024-09-30 ASSESSMENT — COLUMBIA-SUICIDE SEVERITY RATING SCALE - C-SSRS
6. HAVE YOU EVER DONE ANYTHING, STARTED TO DO ANYTHING, OR PREPARED TO DO ANYTHING TO END YOUR LIFE?: NO
2. HAVE YOU ACTUALLY HAD ANY THOUGHTS OF KILLING YOURSELF?: NO
1. IN THE PAST MONTH, HAVE YOU WISHED YOU WERE DEAD OR WISHED YOU COULD GO TO SLEEP AND NOT WAKE UP?: NO

## 2024-09-30 ASSESSMENT — PAIN DESCRIPTION - DESCRIPTORS
DESCRIPTORS: ACHING;BURNING;CRAMPING

## 2024-09-30 ASSESSMENT — PAIN - FUNCTIONAL ASSESSMENT
PAIN_FUNCTIONAL_ASSESSMENT: 0-10

## 2024-09-30 ASSESSMENT — COGNITIVE AND FUNCTIONAL STATUS - GENERAL
MOVING TO AND FROM BED TO CHAIR: A LITTLE
STANDING UP FROM CHAIR USING ARMS: A LITTLE
MOBILITY SCORE: 18
PATIENT BASELINE BEDBOUND: NO
MOVING FROM LYING ON BACK TO SITTING ON SIDE OF FLAT BED WITH BEDRAILS: A LITTLE
HELP NEEDED FOR BATHING: A LITTLE
DRESSING REGULAR UPPER BODY CLOTHING: A LITTLE
TURNING FROM BACK TO SIDE WHILE IN FLAT BAD: A LITTLE
STANDING UP FROM CHAIR USING ARMS: A LITTLE
MOVING TO AND FROM BED TO CHAIR: A LITTLE
WALKING IN HOSPITAL ROOM: A LITTLE
DAILY ACTIVITIY SCORE: 21
HELP NEEDED FOR BATHING: A LITTLE
WALKING IN HOSPITAL ROOM: A LITTLE
DAILY ACTIVITIY SCORE: 20
TURNING FROM BACK TO SIDE WHILE IN FLAT BAD: A LITTLE
TOILETING: A LITTLE
CLIMB 3 TO 5 STEPS WITH RAILING: A LITTLE
WALKING IN HOSPITAL ROOM: A LITTLE
MOBILITY SCORE: 19
CLIMB 3 TO 5 STEPS WITH RAILING: A LITTLE
CLIMB 3 TO 5 STEPS WITH RAILING: A LITTLE
MOVING TO AND FROM BED TO CHAIR: A LITTLE
MOBILITY SCORE: 20
DRESSING REGULAR LOWER BODY CLOTHING: A LITTLE
DRESSING REGULAR LOWER BODY CLOTHING: A LITTLE
TOILETING: A LITTLE
STANDING UP FROM CHAIR USING ARMS: A LITTLE

## 2024-09-30 ASSESSMENT — PAIN SCALES - GENERAL
PAINLEVEL_OUTOF10: 8
PAINLEVEL_OUTOF10: 10 - WORST POSSIBLE PAIN
PAINLEVEL_OUTOF10: 10 - WORST POSSIBLE PAIN
PAINLEVEL_OUTOF10: 0 - NO PAIN
PAINLEVEL_OUTOF10: 10 - WORST POSSIBLE PAIN
PAINLEVEL_OUTOF10: 7
PAINLEVEL_OUTOF10: 5 - MODERATE PAIN
PAINLEVEL_OUTOF10: 5 - MODERATE PAIN
PAINLEVEL_OUTOF10: 7
PAINLEVEL_OUTOF10: 5 - MODERATE PAIN
PAINLEVEL_OUTOF10: 10 - WORST POSSIBLE PAIN

## 2024-09-30 ASSESSMENT — PATIENT HEALTH QUESTIONNAIRE - PHQ9
SUM OF ALL RESPONSES TO PHQ9 QUESTIONS 1 & 2: 0
1. LITTLE INTEREST OR PLEASURE IN DOING THINGS: NOT AT ALL
2. FEELING DOWN, DEPRESSED OR HOPELESS: NOT AT ALL

## 2024-09-30 ASSESSMENT — PAIN DESCRIPTION - LOCATION: LOCATION: KNEE

## 2024-09-30 ASSESSMENT — ACTIVITIES OF DAILY LIVING (ADL)
TOILETING: NEEDS ASSISTANCE
ADEQUATE_TO_COMPLETE_ADL: YES
HEARING - LEFT EAR: FUNCTIONAL
ASSISTIVE_DEVICE: WALKER
WALKS IN HOME: NEEDS ASSISTANCE
HEARING - RIGHT EAR: FUNCTIONAL
BATHING: NEEDS ASSISTANCE
PATIENT'S MEMORY ADEQUATE TO SAFELY COMPLETE DAILY ACTIVITIES?: YES
LACK_OF_TRANSPORTATION: PATIENT DECLINED
JUDGMENT_ADEQUATE_SAFELY_COMPLETE_DAILY_ACTIVITIES: YES
GROOMING: INDEPENDENT
FEEDING YOURSELF: INDEPENDENT
DRESSING YOURSELF: NEEDS ASSISTANCE

## 2024-09-30 ASSESSMENT — LIFESTYLE VARIABLES
SKIP TO QUESTIONS 9-10: 1
AUDIT-C TOTAL SCORE: 2
HOW OFTEN DO YOU HAVE 6 OR MORE DRINKS ON ONE OCCASION: NEVER
HOW OFTEN DO YOU HAVE A DRINK CONTAINING ALCOHOL: 2-4 TIMES A MONTH
AUDIT-C TOTAL SCORE: 2
HOW MANY STANDARD DRINKS CONTAINING ALCOHOL DO YOU HAVE ON A TYPICAL DAY: 1 OR 2

## 2024-09-30 ASSESSMENT — PAIN DESCRIPTION - ORIENTATION: ORIENTATION: RIGHT

## 2024-09-30 NOTE — OP NOTE
Knee Replacement Total Uncement Uni (R) Operative Note     Date: 2024  OR Location: [facility] OR    Name: Waldo Cohen, : 1950, Age: 73 y.o., MRN: [id number], Sex: male    Diagnosis  * No Diagnosis Codes entered * * No Diagnosis Codes entered *     Procedures  Knee Replacement Total Uncement Uni  68531 - [place] ARTHRP KNE CONDYLE&PLATU MEDIAL&LAT COMPARTMENTS      Surgeons      * Daniel Plummer - Primary    Resident/Fellow/Other Assistant:  Surgeons and Role:  * No surgeons found with a matching role *    Procedure Summary  Anesthesia: Anesthesia type not filed in the log.  ASA: ASA status not filed in the log.  Anesthesia Staff: Anesthesiologist: DO KO Diaz-AA: DIANNE Akbar  Estimated Blood Loss: 100 cc mL  Intra-op Medications:   Administrations occurring from 0800 to 1025 on 24:   Medication Name Total Dose   ceFAZolin (Ancef) 2 g in dextrose (iso)  mL 2 g     Tourniquet time: 19 minutes         Anesthesia Record               Intraprocedure I/O Totals       None           Specimen: No specimens collected     Staff:   Circulator: Aura  Scrub Person: Deborah  Scrub Person: Mercy  Scrub Person: Piter         Drains and/or Catheters: * None in log *    Tourniquet Times:     Total Tourniquet Time Documented:  Thigh (Right) - 20 minutes  Total: Thigh (Right) - 20 minutes      Implants:  Implants       Type Name Action Serial No.      Joint PIN, HEADLESS 1/8 X 3.5 FLUTE 4/PK STERILE - [id number] Implanted      Joint BASEPLATE, TRIATHLON TRITANIUM, SIZE 5, 49MM - [id number] Implanted      Joint INSERT, TIBIAL, TRIATHLON CR X3, SZ-5, 9MM - [id number] Implanted      Joint COMPONENT, CR FEMORAL, P5, BEADED W/PA, RIGHT - [id number] Implanted      Joint PATELLA, TRIATHLON, ASYMMETRIC,  SIZE A35 - [id number] Implanted               Findings: See below    Indications: Waldo Cohen is an 73 y.o. male who is having surgery for M17.11 right knee osteoarthritis.  Conservative management no  longer successful    The patient was seen in the preoperative area. The risks, benefits, complications, treatment options, non-operative alternatives, expected recovery and outcomes were discussed with the patient. The possibilities of reaction to medication, pulmonary aspiration, injury to surrounding structures, bleeding, recurrent infection, the need for additional procedures, failure to diagnose a condition, and creating a complication requiring transfusion or operation were discussed with the patient. The patient concurred with the proposed plan, giving informed consent.  The site of surgery was properly noted/marked if necessary per policy. The patient has been actively warmed in preoperative area. Preoperative antibiotics given 20 minutes preop venous thrombosis prophylaxis contralateral mechanical  Procedure Details: The patient was brought to the operating suite, identified, and induced into successful spinal anesthesia.  A tourniquet was placed around his right proximal thigh and he was prepped and draped in the usual sterile fashion distal to this.  After this was done a timeout was performed an anterior midline incision was made the extensor mechanism was carefully exposed and the medial parapatellar t arthrotomy was performed inverting the patella.  Severe arthritis was present involving the medial compartment as well as the patellofemoral joint.  Distal femoral cutting guide was then inserted through an intramedullary drill hole, distal femur was cut at 3 degrees of valgus, resecting 9 mm of bone.  The distal femur was then carefully sized  holes were drilled the distal femur was then cut to a #5 using the 4-in-1 cutting block.  The ACL was removed PCL was protected proximal tibia was subluxed anteriorly and the proximal tibial cut was then made using an edge and an extramedullary cutting guide neutral medial to lateral with a 3 degree posterior slope, matching the physiologic slope  And the  surface of the patella was also removed using an oscillating saw.  After the medial and lateral menisci removed the tourniquet was released all bleeding points were carefully bovied.  Proximal tibia is prepared by using of the slot keel broach, and the #5 TriTanium triathlon press-fit tibial component was impacted into place.  9 mm AP lipped polyethylene insert was then inserted onto the tibia baseplate.     Femoral component was impacted on the distal end of the femur, maintaining excellent bone implant contact.  The knee was passed through full range of motion found to be very stable.  3  holes were drilled the undersurface the patella and a 35 mm triathlon TriTanium press-fit patella was inserted press-fit into place.  The wound was then thoroughly irrigated with Normal Saline solution,  The medial parapatellar thyrotomy was closed using figure-of-eight Vicryl sutures as well as #1 barbed suture.  The subcutaneous tissue and skin were then closed in layers a Prineo type dressing was adhered to the skin.  Final dressing was applied the patient was then transferred to the postanesthesia care unit in stable addition  Complications:  None; patient tolerated the procedure well.    Disposition: PACU - hemodynamically stable.  Condition: stable             Attending Attestation:     Daniel Plummre  Phone Number: [phone]

## 2024-09-30 NOTE — PROGRESS NOTES
Physical Therapy    Physical Therapy Evaluation    Patient Name: Waldo Cohen  MRN: [id number]  Today's Date: 9/30/2024   Time Calculation  Start Time: 1400  Stop Time: 1426  Time Calculation (min): 26 min  4118/4118-A    Assessment/Plan   PT Assessment: Pt demonstrates decreased strength, decreased ROM, decreased activity tolerance, increased pain, and impaired balance/mobility.  Pt appears below baseline level of function and based on current level of function, pt would benefit from continued skilled therapy while in the hospital to ensure safety, decrease risk of falls, and regain strength/mobility back to baseline.  Once stable enough for discharge, pt would benefit from low intensity therapy.     PT Assessment Results: Decreased strength, Decreased range of motion, Impaired balance, Decreased mobility, Pain  Rehab Prognosis: Good  Evaluation/Treatment Tolerance: Patient tolerated treatment well, Patient limited by pain  Medical Staff Made Aware: Yes  End of Session Communication: Bedside nurse  End of Session Patient Position: Up in chair, Alarm on  IP OR SWING BED PT PLAN  Inpatient or Swing Bed: Inpatient  PT Plan  Treatment/Interventions: Bed mobility, Transfer training, Gait training, Stair training, Balance training, Neuromuscular re-education, Strengthening, Range of motion, Therapeutic exercise, Therapeutic activity, Home exercise program  PT Plan: Ongoing PT  PT Frequency: BID  PT Discharge Recommendations: Low intensity level of continued care  Equipment Recommended upon Discharge: Wheeled walker  PT Recommended Transfer Status: Assist x1 (Min A)  PT - OK to Discharge: Yes - To next level of care when cleared by medical team    Subjective     Current Problem:  No diagnosis found.    Past Medical History:  Patient Active Problem List   Diagnosis    Personal history of surgery to heart and great vessels, presenting hazards to health    Coronary artery disease involving native coronary artery of native  heart without angina pectoris    Nonrheumatic aortic valve stenosis    Essential thrombocytosis (Multi)    Dyslipidemia    Atrial fibrillation, unspecified type (Multi)    Presence of Watchman left atrial appendage closure device    Hyperglycemia, unspecified    Essential (primary) hypertension    Hyperkalemia    Hyperlipidemia    Essential thrombocythemia (Multi)    TIA (transient ischemic attack)    Seizure (Multi)    Mitral valve insufficiency    Osteoarthritis of right knee, unspecified osteoarthritis type       General Visit Information:  POD #0 R TKA    On arrival, pt supine in bed.  Pt in no apparent distress and agreeable to therapy.    General  Reason for Referral: R TKA  Referred By: Daniel Plummer  Past Medical History Relevant to Rehab: aortic stenosis, mitral regurgitation s/p repair, PAF s/p watchman, pulmonary HTN, TIA, seizure disorder  Prior to Session Communication: Bedside nurse  Patient Position Received: Bed, 3 rail up, Alarm on    Home Living/PLOF:  Pt lives in house with wife with 1 MICHELLE.  Pt states he will be staying on the main level after surgery.  Has regular bed on this level.  Also has full bath on this level with tub shower and transfer bench with Gbs.  Otherwise, pt's bedroom and additional full bath is on 2nd floor with 12-13 steps with rail.  Has tub shower on 2nd level.  Pt was IND with mobility prior to surgery.  Owns FWW and SPC.  Pt drives.  Pt denies any falls.  Pt's wife will be with him after surgery.     Precautions:  Precautions  Medical Precautions: Fall precautions  Post-Surgical Precautions: Right total knee precautions (WBAT)    Vital Signs:  Vital Signs  SpO2: 99 % (on RA)  Objective     Pain:  Pain Assessment  Pain Assessment: 0-10  0-10 (Numeric) Pain Score:  (7/10 at beginning of session; 9/10 at EOS; RN alerted)    Cognition:  Cognition  Overall Cognitive Status: Within Functional Limits  Orientation Level: Oriented X4    General Assessments:      Activity  Tolerance  Endurance: Decreased tolerance for upright activites    Static Sitting Balance  Static Sitting-Comment/Number of Minutes: good  Dynamic Sitting Balance  Dynamic Sitting-Comments: good  Static Standing Balance  Static Standing-Comment/Number of Minutes: fair plus  Dynamic Standing Balance  Dynamic Standing-Comments: fair    Extremity/Trunk Assessments:  RLE strength: not formally tested 2/2 to recent surgery   LLE strength: WFL    Functional Mobility:  Bed mobility  Supine to sit: Min A x1; assist required at RLE    Transfers  Sit to stand: Min A x1 with Vcs for hand placement; Vcs for weight shifting in standing; x2 trials 2/2 feeling dizzy upon standing    Stand to sit: Min A x1; Vcs for hand placement; x2 trials    Ambulation/Stairs  Pt ambulated 3 steps from bed to chair with CGA and FWW; Vcs for FWW sequencing; pt c/o increased RLE pain and dizziness therefore further ambulation was deferred    Outcome Measures:  [facility] Basic Mobility  Turning from your back to your side while in a flat bed without using bedrails: A little  Moving from lying on your back to sitting on the side of a flat bed without using bedrails: A little  Moving to and from bed to chair (including a wheelchair): A little  Standing up from a chair using your arms (e.g. wheelchair or bedside chair): A little  To walk in hospital room: A little  Climbing 3-5 steps with railing: A little  Basic Mobility - Total Score: 18    Goals:  Encounter Problems       Encounter Problems (Active)       PT Problem       Pt will be able to perform all bed mobility tasks with Mod I.  (Progressing)       Start:  09/30/24    Expected End:  10/14/24            Pt will perform all transfers with Mod I and FWW with proper safety mechanics.   (Progressing)       Start:  09/30/24    Expected End:  10/14/24            Pt will ambulate 250 ft with Mod I using FWW for improved functional independence.  (Progressing)       Start:  09/30/24    Expected End:   10/14/24            Pt will be able to negotiate 1 step with 1 HR with SBA.  (Progressing)       Start:  09/30/24    Expected End:  10/14/24            Pt will improve R knee ROM to 0-120 degrees for ease with functional mobility.   (Progressing)       Start:  09/30/24    Expected End:  10/14/24                 Education Documentation  Precautions, taught by yLn Santana, PT at 9/30/2024  3:34 PM.  Learner: Patient  Readiness: Acceptance  Method: Explanation  Response: Verbalizes Understanding    Body Mechanics, taught by Lyn Santana, PT at 9/30/2024  3:34 PM.  Learner: Patient  Readiness: Acceptance  Method: Explanation  Response: Verbalizes Understanding    Home Exercise Program, taught by Lyn Santana, PT at 9/30/2024  3:34 PM.  Learner: Patient  Readiness: Acceptance  Method: Explanation  Response: Verbalizes Understanding    Mobility Training, taught by Lyn Santana, PT at 9/30/2024  3:34 PM.  Learner: Patient  Readiness: Acceptance  Method: Explanation  Response: Verbalizes Understanding    Education Comments  No comments found.

## 2024-09-30 NOTE — ANESTHESIA PREPROCEDURE EVALUATION
Patient: Waldo Cohen    Procedure Information       Anesthesia Start Date/Time: 09/30/24 0808    Procedure: Knee Replacement Total Uncement Uni (Right: Knee)    Location: [facility] OR 02 / Virtual [facility]    Surgeons: Daniel Plummer MD          Vitals:    09/30/24 0754   BP: 132/72   Pulse: 68   Resp: 20   Temp: 36.3 °C (97.3 °F)   SpO2: 97%       Past Surgical History:   Procedure Laterality Date    CARDIAC CATHETERIZATION N/A 02/27/2024    Procedure: LAAO (Left Atrial Appendage Occlusion);  Surgeon: Golden rBay MD;  Location: [address] Cardiac Cath Lab;  Service: Cardiovascular;  Laterality: N/A;  Same Day CT 1000    KNEE ARTHROPLASTY      MR HEAD ANGIO WO IV CONTRAST  03/01/2022    MR HEAD ANGIO WO IV CONTRAST 3/1/2022 [facility]    MR NECK ANGIO WO IV CONTRAST  03/01/2022    MR NECK ANGIO WO IV CONTRAST 3/1/2022 [facility]    OTHER SURGICAL HISTORY  02/25/2022    Finger amputation    OTHER SURGICAL HISTORY  03/14/2022    Colonoscopy     Past Medical History:   Diagnosis Date    Arrhythmia     afib    Body mass index (BMI) 28.0-28.9, adult     BMI 28.0-28.9,adult    Cerebral infarction, unspecified (Multi) 10/24/2016    Cryptogenic stroke    Gastrojejunal ulcer, unspecified as acute or chronic, without hemorrhage or perforation     Sepsis-related gastrointestinal ulceration    Heart disease     Heart valve disease     Overweight     Overweight (BMI 25.0-29.9)    Personal history of other diseases of the circulatory system     History of coronary atherosclerosis    Seizure disorder (Multi)        Current Facility-Administered Medications:     sodium chloride 0.9% infusion, 20 mL/hr, intravenous, Continuous, Daniel Plummer MD    Facility-Administered Medications Ordered in Other Encounters:     bupivacaine PF (Marcaine) injection 0.75 %, , intrathecal, PRN, DIANNE Akbar, 1.8 mL at 09/30/24 0822    lactated Ringer's bolus, , intravenous, Continuous PRN, DIANNE Akbar, New Bag  at 24 0808    midazolam (Versed) injection, , intravenous, PRN, DIANNE Akbar, 2 mg at 24    propofol (Diprivan) injection, , intravenous, Continuous PRN, DIANNE Akbar, Last Rate: 39.168 mL/hr at 24 09, 80 mcg/kg/min at 24 09  Prior to Admission medications    Medication Sig Start Date End Date Taking? Authorizing Provider   aspirin 81 mg EC tablet Take 1 tablet (81 mg) by mouth once daily. 3/21/24  Yes Christopher Meyers MD   atorvastatin (Lipitor) 40 mg tablet TAKE 1 TABLET EVERY DAY  Patient taking differently: Take 1 tablet (40 mg) by mouth once daily at bedtime. 24  Yes Pramod Weathers MD   brivaracetam (Briviact) 100 mg tablet tablet Take 1 tablet (100 mg) by mouth 2 times a day. 24 Yes Debi Gaytan MD   chlorhexidine (Peridex) 0.12 % solution 15 milliliter(s) orally once a day for 2 doses 15 ml  the night before surgery and 15 ml morning of surgery - swish for 30 seconds -DO NOT SWALLOW, SPIT OUT 24  Yes Vero Gomez APRN-CNP   clopidogrel (Plavix) 75 mg tablet Take 1 tablet (75 mg) by mouth once daily. 24  Yes Rick Huston MD   multivitamin tablet Take 1 tablet by mouth once daily.   Yes Historical Provider, MD   pantoprazole (ProtoNix) 40 mg EC tablet TAKE 1 TABLET EVERY MORNING BEFORE A MEAL 24  Yes Christopher Meyers MD   amoxicillin (Amoxil) 500 mg capsule Take 4 caps one hour prior to dental procedure  Patient not taking: Reported on 2024 8/15/24   Christopher Meyers MD     Allergies   Allergen Reactions    Demerol [Meperidine] GI Upset     Social History     Tobacco Use    Smoking status: Former     Current packs/day: 0.00     Average packs/day: 0.5 packs/day for 10.0 years (5.0 ttl pk-yrs)     Types: Cigarettes     Start date:      Quit date:      Years since quittin.7    Smokeless tobacco: Never   Substance Use Topics    Alcohol use: Yes     Alcohol/week: 1.0 standard drink of alcohol      Types: 1 Shots of liquor per week         Chemistry    Lab Results   Component Value Date/Time     09/16/2024 0844    K 5.6 (H) 09/16/2024 0844     09/16/2024 0844    CO2 29 09/16/2024 0844    BUN 33 (H) 09/16/2024 0844    CREATININE 1.37 (H) 09/16/2024 0844    Lab Results   Component Value Date/Time    CALCIUM 9.3 09/16/2024 0844    ALKPHOS 45 05/10/2024 0940    AST 19 05/10/2024 0940    ALT 19 05/10/2024 0940    BILITOT 0.9 05/10/2024 0940          Lab Results   Component Value Date/Time    WBC 11.3 09/16/2024 0844    HGB 13.6 09/16/2024 0844    HCT 43.1 09/16/2024 0844     09/16/2024 0844     Lab Results   Component Value Date/Time    PROTIME CANCELED 08/01/2023 0500    INR 2.50 01/15/2024 0832     Encounter Date: 09/16/24   ECG 12 Lead   Result Value    Ventricular Rate 79    Atrial Rate 79    NJ Interval 192    QRS Duration 92    QT Interval 378    QTC Calculation(Bazett) 433    P Axis 41    R Axis 27    T Axis 47    QRS Count 13    Q Onset 223    P Onset 127    P Offset 171    T Offset 412    QTC Fredericia 414    Narrative    Sinus rhythm with Premature atrial complexes  Possible Left atrial enlargement  Otherwise normal ECG  When compared with ECG of 27-FEB-2024 10:10,  Premature atrial complexes are now Present  NJ interval has decreased  Confirmed by Christopher Corley ([id number]) on 9/17/2024 5:08:37 PM        Relevant Problems   Cardiac   (+) Atrial fibrillation, unspecified type (Multi)   (+) Coronary artery disease involving native coronary artery of native heart without angina pectoris   (+) Essential (primary) hypertension   (+) Hyperlipidemia   (+) Mitral valve insufficiency   (+) Nonrheumatic aortic valve stenosis      Neuro   (+) Seizure (Multi)   (+) TIA (transient ischemic attack)      Hematology   (+) Essential thrombocythemia (Multi)   (+) Essential thrombocytosis       Clinical information reviewed:   Tobacco  Allergies  Meds   Med Hx  Surg Hx   Fam Hx  Soc Hx        NPO  Detail:  NPO/Void Status  Carbohydrate Drink Given Prior to Surgery? : Y  Date of Last Liquid: 09/30/24  Time of Last Liquid: 0530  Date of Last Solid: 09/29/24  Time of Last Solid: 1730  Last Intake Type: Clear fluids  Time of Last Void: 0753         Physical Exam    Airway  Mallampati: II  TM distance: >3 FB     Cardiovascular - normal exam  Rhythm: regular  Rate: normal     Dental - normal exam     Pulmonary - normal exam     Abdominal - normal exam  Abdomen: soft             Anesthesia Plan    History of general anesthesia?: yes  History of complications of general anesthesia?: no    ASA 3     regional, spinal and general     The patient is not a current smoker.  Patient was previously instructed to abstain from smoking on day of procedure.  Patient did not smoke on day of procedure.  Education provided regarding risk of obstructive sleep apnea.  intravenous induction   Postoperative administration of opioids is intended.  Anesthetic plan and risks discussed with patient.  Use of blood products discussed with patient who.    Plan discussed with CAA.

## 2024-09-30 NOTE — DISCHARGE INSTR - ACTIVITY
Call Dr. Plummer for any problems and/or concerns.  *Full weight bearing with walker  *Maintain knee precautions  *No pillow under affected knee  *Raise (elevate) your leg above the level of your heart while you are sitting or lying down (place pillow underneath calf)  *You may shower, do not soak or scrub incision; pat dry  *Change mepilex dressing post-operative day #7 to new dressing  *If you have a polar care cooling device, use as instructed; otherwise  Apply ice to affected knee as needed to minimize swelling, on 20 minutes, off 20 minutes  *Move your toes often to avoid stiffness and to lessen swelling  *Avoid sitting for a long time without moving. Get up to take short walks every 1-2 hours. This is important to improve blood flow and breathing. Ask for help if you feel weak or unsteady  *Continue the coughing and deep breathing exercises that you learned in the hospital    Call Doctor right away for:  *Fever of 100.4 or above, shaking chills  *Stiffness, or inability to move the knee  *Increased swelling in your leg  *Increased redness, tenderness, or swelling in or around the knee incision  *Drainage from the knee incision  *Increased knee pain  *An incision that breaks open  *Chest pain/shortness of breath-call 911    After the procedure you can expect pain, swelling, and limited range of motion    Narcotic pain medication may cause constipation. You may need to take actions to prevent or treat constipation, such as:  -drink enough fluid to keep your urine pale yellow  -take over-the-counter or prescription medicines  -eat foods that are high in fiber, such as beans, whole grains, and fresh fruits and vegetables  -limit foods that are high in fat and processed sugars, such as fried or sweet foods    Take your blood thinner (anticoagulant) as prescribed by your physician to prevent blood clots.   If your physician prescribed PETRA Hose (compression stockings)-wear as instructed as they will help reduce  swelling and the formation of blood clots    Do not use any products that contain nicotine or tobacco, such as cigarettes, e-cigarettes, and chewing tobacco. These can delay healing after surgery. If you need help quitting, ask your health care provider    If you were given a knee immobilizer, please take it home with you and keep it handy for at least 4 weeks just in case any issues arise (i.e. knee buckling)

## 2024-09-30 NOTE — ANESTHESIA PROCEDURE NOTES
Spinal Block    Patient location during procedure: OR  Start time: 9/30/2024 8:15 AM  End time: 9/30/2024 8:23 AM  Reason for block: primary anesthetic  Staffing  Performed: DIANNE   Authorized by: Bulmaro Henry DO    Performed by: DIANNE Akbar    Preanesthetic Checklist  Completed: patient identified, IV checked, site marked, risks and benefits discussed, surgical consent, monitors and equipment checked, pre-op evaluation, timeout performed and sterile techniques followed  Block Timeout  RN/Licensed healthcare professional reads aloud to the Anesthesia provider and entire team: Patient identity, procedure with side and site, patient position, and as applicable the availability of implants/special equipment/special requirements.  Patient on coagulant treatment: no  Timeout performed at: 9/30/2024 8:10 AM  Spinal Block  Patient position: sitting  Prep: ChloraPrep  Sterility prep: cap, drape, gloves, hand hygiene and mask  Sedation level: light sedation  Patient monitoring: blood pressure, continuous pulse oximetry and heart rate  Approach: midline  Vertebral space: L3-4  Injection technique: single-shot  Needle  Needle type: pencil-point   Needle gauge: 25 G  Needle length: 3.5 in  Free flowing CSF: yes    Assessment  Sensory level: T6 bilateral  Block outcome: patient comfortable  Procedure assessment: patient tolerated procedure well with no immediate complications

## 2024-09-30 NOTE — ANESTHESIA POSTPROCEDURE EVALUATION
Patient: Waldo Cohen    Procedure Summary       Date: 09/30/24 Room / Location: [facility] OR 02 / Virtual [facility] OR    Anesthesia Start: 0808 Anesthesia Stop:     Procedure: Knee Replacement Total Uncement Uni (Right: Knee) Diagnosis: (M17.11 right knee osteoarthritis)    Surgeons: Daniel Plummer MD Responsible Provider: Bulmaro Henry DO    Anesthesia Type: spinal ASA Status: 3            Anesthesia Type: spinal    Vitals Value Taken Time   /87 09/30/24 0952   Temp 36 09/30/24 0952   Pulse 74 09/30/24 0952   Resp 16 09/30/24 0952   SpO2 98 09/30/24 0952       Anesthesia Post Evaluation    Patient location during evaluation: PACU  Patient participation: complete - patient cannot participate  Level of consciousness: sleepy but conscious  Pain management: adequate  Airway patency: patent  Cardiovascular status: acceptable  Respiratory status: acceptable and spontaneous ventilation  Hydration status: acceptable  Postoperative Nausea and Vomiting: none        There were no known notable events for this encounter.

## 2024-09-30 NOTE — BRIEF OP NOTE
Date: 2024  OR Location: [facility]    Name: Waldo Cohen, : 1950, Age: 73 y.o., MRN: [id number], Sex: male    Diagnosis  * No Diagnosis Codes entered * * No Diagnosis Codes entered *     Procedures  Knee Replacement Total [facility]  16165 - [place] ARTHRP KNE CONDYLE&PLATU MEDIAL&LAT COMPARTMENTS      Surgeons      * Daniel Plummer - Primary    Resident/Fellow/Other Assistant:  Surgeons and Role:  * No surgeons found with a matching role *    Procedure Summary  Anesthesia: Anesthesia type not filed in the log.  ASA: ASA status not filed in the log.  Anesthesia Staff: Anesthesiologist: Bulmaro Henry DO  C-AA: DIANNE Akbar  Estimated Blood Loss: 100 mL  Intra-op Medications:   Administrations occurring from 0800 to 1025 on 24:   Medication Name Total Dose   ceFAZolin (Ancef) 2 g in dextrose (iso)  mL 2 g     Tourniquet time 19 minutes         Anesthesia Record               Intraprocedure I/O Totals       None           Specimen: No specimens collected     Staff:   Circulator: Aura  Scrub Person: Deborah  Scrub Person: Mercy  Scrub Person: Piter          Findings: See op note    Complications:  None; patient tolerated the procedure well.     Disposition: PACU - hemodynamically stable.  Condition: stable  Specimens Collected: No specimens collected  Attending Attestation:     Daniel Plummer  Phone Number: [phone]

## 2024-10-01 VITALS
WEIGHT: 180 LBS | RESPIRATION RATE: 18 BRPM | HEIGHT: 69 IN | TEMPERATURE: 97.5 F | SYSTOLIC BLOOD PRESSURE: 161 MMHG | HEART RATE: 81 BPM | DIASTOLIC BLOOD PRESSURE: 90 MMHG | BODY MASS INDEX: 26.66 KG/M2 | OXYGEN SATURATION: 97 %

## 2024-10-01 LAB
ANION GAP SERPL CALC-SCNC: 10 MMOL/L (ref 10–20)
BUN SERPL-MCNC: 18 MG/DL (ref 6–23)
CALCIUM SERPL-MCNC: 8 MG/DL (ref 8.6–10.3)
CHLORIDE SERPL-SCNC: 102 MMOL/L (ref 98–107)
CO2 SERPL-SCNC: 26 MMOL/L (ref 21–32)
CREAT SERPL-MCNC: 0.88 MG/DL (ref 0.5–1.3)
EGFRCR SERPLBLD CKD-EPI 2021: >90 ML/MIN/1.73M*2
ERYTHROCYTE [DISTWIDTH] IN BLOOD BY AUTOMATED COUNT: 15.9 % (ref 11.5–14.5)
GLUCOSE SERPL-MCNC: 117 MG/DL (ref 74–99)
HCT VFR BLD AUTO: 35.5 % (ref 41–52)
HGB BLD-MCNC: 11.2 G/DL (ref 13.5–17.5)
MCH RBC QN AUTO: 27.7 PG (ref 26–34)
MCHC RBC AUTO-ENTMCNC: 31.5 G/DL (ref 32–36)
MCV RBC AUTO: 88 FL (ref 80–100)
NRBC BLD-RTO: 0 /100 WBCS (ref 0–0)
PLATELET # BLD AUTO: 340 X10*3/UL (ref 150–450)
POTASSIUM SERPL-SCNC: 4.2 MMOL/L (ref 3.5–5.3)
RBC # BLD AUTO: 4.04 X10*6/UL (ref 4.5–5.9)
SODIUM SERPL-SCNC: 134 MMOL/L (ref 136–145)
WBC # BLD AUTO: 15.9 X10*3/UL (ref 4.4–11.3)

## 2024-10-01 PROCEDURE — 97110 THERAPEUTIC EXERCISES: CPT | Mod: GP,CQ

## 2024-10-01 PROCEDURE — 36415 COLL VENOUS BLD VENIPUNCTURE: CPT | Performed by: ORTHOPAEDIC SURGERY

## 2024-10-01 PROCEDURE — 85027 COMPLETE CBC AUTOMATED: CPT | Performed by: ORTHOPAEDIC SURGERY

## 2024-10-01 PROCEDURE — 7100000011 HC EXTENDED STAY RECOVERY HOURLY - NURSING UNIT

## 2024-10-01 PROCEDURE — 97165 OT EVAL LOW COMPLEX 30 MIN: CPT | Mod: GO | Performed by: OCCUPATIONAL THERAPIST

## 2024-10-01 PROCEDURE — 80048 BASIC METABOLIC PNL TOTAL CA: CPT | Performed by: ORTHOPAEDIC SURGERY

## 2024-10-01 PROCEDURE — 2500000001 HC RX 250 WO HCPCS SELF ADMINISTERED DRUGS (ALT 637 FOR MEDICARE OP): Performed by: ORTHOPAEDIC SURGERY

## 2024-10-01 PROCEDURE — 97116 GAIT TRAINING THERAPY: CPT | Mod: GP,CQ

## 2024-10-01 PROCEDURE — 2500000004 HC RX 250 GENERAL PHARMACY W/ HCPCS (ALT 636 FOR OP/ED): Performed by: ORTHOPAEDIC SURGERY

## 2024-10-01 RX ORDER — OXYCODONE HYDROCHLORIDE 5 MG/1
5 TABLET ORAL EVERY 6 HOURS PRN
Qty: 28 TABLET | Refills: 0 | Status: SHIPPED | OUTPATIENT
Start: 2024-10-01 | End: 2024-10-08

## 2024-10-01 RX ORDER — ASPIRIN 81 MG/1
81 TABLET ORAL 2 TIMES DAILY
Start: 2024-10-01 | End: 2024-10-31

## 2024-10-01 RX ORDER — POLYETHYLENE GLYCOL 3350 17 G/17G
17 POWDER, FOR SOLUTION ORAL DAILY
Qty: 7 PACKET | Refills: 0 | Status: SHIPPED | OUTPATIENT
Start: 2024-10-02 | End: 2024-10-09

## 2024-10-01 RX ORDER — ACETAMINOPHEN 325 MG/1
650 TABLET ORAL EVERY 6 HOURS PRN
Qty: 240 TABLET | Refills: 0 | Status: SHIPPED | OUTPATIENT
Start: 2024-10-01 | End: 2024-10-31

## 2024-10-01 ASSESSMENT — COGNITIVE AND FUNCTIONAL STATUS - GENERAL
MOBILITY SCORE: 18
DRESSING REGULAR UPPER BODY CLOTHING: A LITTLE
CLIMB 3 TO 5 STEPS WITH RAILING: A LITTLE
MOVING FROM LYING ON BACK TO SITTING ON SIDE OF FLAT BED WITH BEDRAILS: A LITTLE
TOILETING: A LITTLE
PERSONAL GROOMING: A LITTLE
DRESSING REGULAR LOWER BODY CLOTHING: A LITTLE
DAILY ACTIVITIY SCORE: 19
HELP NEEDED FOR BATHING: A LITTLE
STANDING UP FROM CHAIR USING ARMS: A LITTLE
WALKING IN HOSPITAL ROOM: A LITTLE
TURNING FROM BACK TO SIDE WHILE IN FLAT BAD: A LITTLE
MOVING TO AND FROM BED TO CHAIR: A LITTLE

## 2024-10-01 ASSESSMENT — PAIN SCALES - GENERAL
PAINLEVEL_OUTOF10: 8
PAINLEVEL_OUTOF10: 7
PAINLEVEL_OUTOF10: 6
PAINLEVEL_OUTOF10: 7
PAINLEVEL_OUTOF10: 8

## 2024-10-01 ASSESSMENT — PAIN - FUNCTIONAL ASSESSMENT
PAIN_FUNCTIONAL_ASSESSMENT: 0-10

## 2024-10-01 ASSESSMENT — PAIN DESCRIPTION - LOCATION: LOCATION: KNEE

## 2024-10-01 ASSESSMENT — PAIN DESCRIPTION - ORIENTATION: ORIENTATION: RIGHT

## 2024-10-01 NOTE — PROGRESS NOTES
Spiritual Care Visit    Clinical Encounter Type  Visited With: Patient and family together  Routine Visit: Introduction                                            Taxonomy  Intended Effects: Build relationship of care and support, Preserve dignity and respect, Demonstrate caring and concern  Methods: Offer spiritual/Oriental orthodox support  Interventions: Share words of hope and inspiration

## 2024-10-01 NOTE — PROGRESS NOTES
Physical Therapy    Physical Therapy    Physical Therapy Treatment    Patient Name: Waldo Cohen  MRN: [id number]  Today's Date: 10/1/2024  Time Calculation  Start Time: 1132  Stop Time: 1200  Time Calculation (min): 28 min     4118/4118-A    Assessment/Plan   PT Assessment  PT Assessment Results: Decreased strength, Decreased range of motion, Impaired balance, Decreased mobility, Pain  End of Session Communication: Bedside nurse  End of Session Patient Position: Up in chair, Alarm on  PT Plan  Inpatient/Swing Bed or Outpatient: Inpatient  PT Plan  Treatment/Interventions: Bed mobility, Transfer training, Gait training, Stair training, Balance training, Neuromuscular re-education, Strengthening, Range of motion, Therapeutic exercise, Therapeutic activity, Home exercise program  PT Plan: Ongoing PT  PT Frequency: BID  PT Discharge Recommendations: Low intensity level of continued care  Equipment Recommended upon Discharge: Wheeled walker  PT Recommended Transfer Status: Assist x1 (Min A)  PT - OK to Discharge: Yes     10/01/24 1132   PT  Visit   PT Received On 10/01/24   Response to Previous Treatment Patient with no complaints from previous session.   General   Reason for Referral R TKA   Referred By Daniel Plummer   Patient Position Received Bed, 3 rail up   Preferred Learning Style verbal;visual   General Comment Patient motivated wife presnet for PT session   Precautions   LE Weight Bearing Status Weight Bearing as Tolerated   Medical Precautions Fall precautions   Post-Surgical Precautions Right total knee precautions   Pain Assessment   Pain Assessment 0-10   0-10 (Numeric) Pain Score 8   Pain Type Surgical pain   Pain Location Knee   Pain Orientation Right   Therapeutic Exercise   Therapeutic Exercise Performed Yes   Therapeutic Exercise Activity 1 AP,QS,GS,SLR,HIP ABD, MARCHING,BALL SQUEEZES X 20 B WITH EMPHASIS ON OPERATED LE  (assist  for active quad exercises R LE)   Therapeutic Exercise Activity 2  ROM  (10-65)   Bed Mobility   Bed Mobility Yes   Bed Mobility 1   Bed Mobility 1 Supine to sitting   Level of Assistance 1 Contact guard   Ambulation/Gait Training   Ambulation/Gait Training Performed Yes   Ambulation/Gait Training 1   Surface 1 Level tile   Device 1 Rolling walker   Gait Support Devices Gait belt   Assistance 1 Contact guard   Quality of Gait 1 Decreased step length;Inconsistent stride length;Diminished heel strike   Comments/Distance (ft) 1 100 x 2   Transfers   Transfer Yes   Transfer 1   Technique 1 Sit to stand;Stand to sit   Transfer Device 1 Walker   Transfer Level of Assistance 1 Close supervision;Contact guard   Stairs   Stairs Yes   Stairs   Rails 1 Right   Device 1 Single point cane   Assistance 1 Minimum assistance   Comment/Number of Steps 1 1  (Patient dmeos good stability on step)   Activity Tolerance   Endurance Decreased tolerance for upright activites   PT Assessment   PT Assessment Results Decreased strength;Decreased range of motion;Impaired balance;Decreased mobility;Pain   End of Session Communication Bedside nurse   End of Session Patient Position Up in chair;Alarm on   PT Plan   Inpatient/Swing Bed or Outpatient Inpatient     Outcome Measures:  [facility] Basic Mobility  Turning from your back to your side while in a flat bed without using bedrails: A little  Moving from lying on your back to sitting on the side of a flat bed without using bedrails: A little  Moving to and from bed to chair (including a wheelchair): A little  Standing up from a chair using your arms (e.g. wheelchair or bedside chair): A little  To walk in hospital room: A little  Climbing 3-5 steps with railing: A little  Basic Mobility - Total Score: 18                             EDUCATION:     Education Documentation  No documentation found.  Education Comments  No comments found.        GOALS:  Encounter Problems       Encounter Problems (Active)       PT Problem       Pt will be able to perform all bed  mobility tasks with Mod I.  (Progressing)       Start:  09/30/24    Expected End:  10/14/24            Pt will perform all transfers with Mod I and FWW with proper safety mechanics.   (Progressing)       Start:  09/30/24    Expected End:  10/14/24            Pt will ambulate 250 ft with Mod I using FWW for improved functional independence.  (Progressing)       Start:  09/30/24    Expected End:  10/14/24            Pt will be able to negotiate 1 step with 1 HR with SBA.  (Progressing)       Start:  09/30/24    Expected End:  10/14/24            Pt will improve R knee ROM to 0-120 degrees for ease with functional mobility.   (Progressing)       Start:  09/30/24    Expected End:  10/14/24               Pain - Adult

## 2024-10-01 NOTE — NURSING NOTE
Discharge teaching completed, voiced understanding.  Taken via w/c at this time to private vehicle.  Vitals stable, see flow sheet.

## 2024-10-01 NOTE — CARE PLAN
The clinical goals for the shift include pain control.    Over the shift, the patient did make progress toward the following goals.     Problem: Pain - Adult  Goal: Verbalizes/displays adequate comfort level or baseline comfort level  10/1/2024 0445 by Adeel Serrano RN  Outcome: Progressing  10/1/2024 0443 by Adeel Serrano RN  Outcome: Progressing     Problem: Safety - Adult  Goal: Free from fall injury  10/1/2024 0445 by Adeel Serrano RN  Outcome: Progressing     Problem: Fall/Injury  Goal: Not fall by end of shift  10/1/2024 0443 by Adeel Serrano RN  Outcome: Progressing

## 2024-10-01 NOTE — PROGRESS NOTES
Physical Therapy    Physical Therapy    Physical Therapy Treatment    Patient Name: Waldo Cohen  MRN: [id number]  Today's Date: 10/1/2024  Time Calculation  Start Time: 0744  Stop Time: 0825  Time Calculation (min): 41 min     4118/4118-A    Assessment/Plan   PT Assessment  PT Assessment Results: Decreased strength, Decreased range of motion, Impaired balance, Decreased mobility, Pain  End of Session Communication: Bedside nurse  End of Session Patient Position: Up in chair, Alarm on  PT Plan  Inpatient/Swing Bed or Outpatient: Inpatient  PT Plan  Treatment/Interventions: Bed mobility, Transfer training, Gait training, Stair training, Balance training, Neuromuscular re-education, Strengthening, Range of motion, Therapeutic exercise, Therapeutic activity, Home exercise program  PT Plan: Ongoing PT  PT Frequency: BID  PT Discharge Recommendations: Low intensity level of continued care  Equipment Recommended upon Discharge: Wheeled walker  PT Recommended Transfer Status: Assist x1 (Min A)  PT - OK to Discharge: Yes     10/01/24 0744   PT  Visit   PT Received On 10/01/24   Response to Previous Treatment Patient with no complaints from previous session.   General   Reason for Referral R TKA   Referred By Daniel Plummer   Patient Position Received Bed, 3 rail up   Preferred Learning Style verbal;visual   General Comment Patient motivated wife presnet for PT session   Precautions   LE Weight Bearing Status Weight Bearing as Tolerated   Medical Precautions Fall precautions   Post-Surgical Precautions Right total knee precautions   Pain Assessment   Pain Assessment 0-10   0-10 (Numeric) Pain Score 7   Pain Type Surgical pain   Pain Location Knee   Pain Orientation Right   Pain Interventions Cold applied   Cognition   Overall Cognitive Status WFL   Therapeutic Exercise   Therapeutic Exercise Performed Yes   Therapeutic Exercise Activity 1 AP,QS,GS,SLR,HIP ABD, MARCHING,BALL SQUEEZES X 20 B WITH EMPHASIS ON OPERATED LE  (assist   for active quad exercises R LE)   Therapeutic Exercise Activity 2 ROM  (10-65)   Bed Mobility   Bed Mobility Yes   Bed Mobility 1   Bed Mobility 1 Supine to sitting   Level of Assistance 1 Contact guard   Bed Mobility Comments 1 Instructyed in use of gait belt as leg    Ambulation/Gait Training   Ambulation/Gait Training Performed Yes   Ambulation/Gait Training 1   Surface 1 Level tile   Device 1 Rolling walker   Gait Support Devices Gait belt   Assistance 1 Contact guard   Quality of Gait 1 Decreased step length;Inconsistent stride length;Diminished heel strike   Comments/Distance (ft) 1 100,150  (Patient is able to complete reciprocating steps by second gait trial)   Transfers   Transfer Yes   Transfer 1   Technique 1 Sit to stand;Stand to sit   Transfer Device 1 Walker   Transfer Level of Assistance 1 Close supervision;Contact guard   Trials/Comments 1 x4   Activity Tolerance   Endurance Decreased tolerance for upright activites   PT Assessment   PT Assessment Results Decreased strength;Decreased range of motion;Impaired balance;Decreased mobility;Pain   End of Session Communication Bedside nurse   End of Session Patient Position Up in chair;Alarm on   PT Plan   Inpatient/Swing Bed or Outpatient Inpatient     Outcome Measures:  [facility] Basic Mobility  Turning from your back to your side while in a flat bed without using bedrails: A little  Moving from lying on your back to sitting on the side of a flat bed without using bedrails: A little  Moving to and from bed to chair (including a wheelchair): A little  Standing up from a chair using your arms (e.g. wheelchair or bedside chair): A little  To walk in hospital room: A little  Climbing 3-5 steps with railing: A little  Basic Mobility - Total Score: 18                             EDUCATION:     Education Documentation  No documentation found.  Education Comments  No comments found.        GOALS:  Encounter Problems       Encounter Problems (Active)       PT  Problem       Pt will be able to perform all bed mobility tasks with Mod I.  (Progressing)       Start:  09/30/24    Expected End:  10/14/24            Pt will perform all transfers with Mod I and FWW with proper safety mechanics.   (Progressing)       Start:  09/30/24    Expected End:  10/14/24            Pt will ambulate 250 ft with Mod I using FWW for improved functional independence.  (Progressing)       Start:  09/30/24    Expected End:  10/14/24            Pt will be able to negotiate 1 step with 1 HR with SBA.  (Progressing)       Start:  09/30/24    Expected End:  10/14/24            Pt will improve R knee ROM to 0-120 degrees for ease with functional mobility.   (Progressing)       Start:  09/30/24    Expected End:  10/14/24               Pain - Adult

## 2024-10-01 NOTE — PROGRESS NOTES
10/01/24 1106   Discharge Planning   Living Arrangements Spouse/significant other   Support Systems Spouse/significant other   Type of Residence Private residence   Home or Post Acute Services In home services   Type of Home Care Services Home PT   Expected Discharge Disposition Othe   Does the patient need discharge transport arranged? No   Patient Choice   Patient / Family choosing to utilize agency / facility established prior to hospitalization Yes     Met with patient at bedside. Admitted for R TKA. Pt lives with spouse and was independent PTA with no HHC. Pt has a walker. PCP is Christopher Meyers. Pt feels he is able to manage his health and understands his medications. Pt was able to drive and obtain meds. PT AMPAC 18. Pt plans to return home with [facility] PT. Provided pt with [facility] contact information to call them when he arrives home. Family will provide transport.

## 2024-10-01 NOTE — PROGRESS NOTES
"Waldo Cohen is a 73 y.o. male on day 0 of admission presenting with Osteoarthritis of right knee, unspecified osteoarthritis type.    Subjective   Sitting up in chair upon entering room.  Postoperative pain at this time is high, patient is due for pain medication.  Reports pain improves considerably with pain medication.  No complaints of chest pain, shortness of breath, lightheaded or dizziness.  Tolerating a diet with no nausea vomiting.  Voiding well.  Fully participating in physical and Occupational Therapy.  Using incentive spirometry.  Plan is for discharge to home with home health care.       Objective     Physical Exam  Constitutional:       Appearance: Normal appearance.   HENT:      Head: Normocephalic and atraumatic.      Nose: Nose normal.      Mouth/Throat:      Mouth: Mucous membranes are moist.   Cardiovascular:      Rate and Rhythm: Normal rate.   Pulmonary:      Effort: Pulmonary effort is normal.   Abdominal:      General: Abdomen is flat.      Palpations: Abdomen is soft.   Musculoskeletal:      Cervical back: Neck supple.      Comments: Bulky surgical dressing to right lower extremity parted and dressing beneath appears clean dry and intact sensation present throughout right lower extremity, plantar and dorsiflexion of foot against resistance present, DP pulse palpable right foot   Skin:     General: Skin is warm and dry.   Neurological:      General: No focal deficit present.      Mental Status: He is alert.   Psychiatric:         Mood and Affect: Mood normal.         Last Recorded Vitals  Blood pressure 132/75, pulse 82, temperature 36.6 °C (97.9 °F), temperature source Temporal, resp. rate 18, height 1.753 m (5' 9\"), weight 81.6 kg (180 lb), SpO2 96%.  Intake/Output last 3 Shifts:  I/O last 3 completed shifts:  In: 1190 (14.6 mL/kg) [P.O.:90; IV Piggyback:1100]  Out: 770 (9.4 mL/kg) [Urine:770 (0.3 mL/kg/hr)]  Weight: 81.6 kg     Relevant Results  Results for orders placed or performed " during the hospital encounter of 09/30/24 (from the past 24 hour(s))   CBC   Result Value Ref Range    WBC 15.9 (H) 4.4 - 11.3 x10*3/uL    nRBC 0.0 0.0 - 0.0 /100 WBCs    RBC 4.04 (L) 4.50 - 5.90 x10*6/uL    Hemoglobin 11.2 (L) 13.5 - 17.5 g/dL    Hematocrit 35.5 (L) 41.0 - 52.0 %    MCV 88 80 - 100 fL    MCH 27.7 26.0 - 34.0 pg    MCHC 31.5 (L) 32.0 - 36.0 g/dL    RDW 15.9 (H) 11.5 - 14.5 %    Platelets 340 150 - 450 x10*3/uL   Basic metabolic panel   Result Value Ref Range    Glucose 117 (H) 74 - 99 mg/dL    Sodium 134 (L) 136 - 145 mmol/L    Potassium 4.2 3.5 - 5.3 mmol/L    Chloride 102 98 - 107 mmol/L    Bicarbonate 26 21 - 32 mmol/L    Anion Gap 10 10 - 20 mmol/L    Urea Nitrogen 18 6 - 23 mg/dL    Creatinine 0.88 0.50 - 1.30 mg/dL    eGFR >90 >60 mL/min/1.73m*2    Calcium 8.0 (L) 8.6 - 10.3 mg/dL     Scheduled medications  acetaminophen, 650 mg, oral, q6h CONSTANZA  brivaracetam, 100 mg, oral, BID  clopidogrel, 75 mg, oral, Daily  polyethylene glycol, 17 g, oral, Daily      Continuous medications  lactated Ringer's, 100 mL/hr, Last Rate: 100 mL/hr (09/30/24 1021)  oxygen, 2 L/min  sodium chloride 0.9%, 20 mL/hr  sodium chloride 0.9%, 100 mL/hr, Last Rate: Stopped (10/01/24 0807)      PRN medications  PRN medications: diphenhydrAMINE, HYDROmorphone, naloxone, ondansetron **OR** ondansetron, oxyCODONE, oxyCODONE    Assessment/Plan   Principal Problem:    Osteoarthritis of right knee, unspecified osteoarthritis type    Postop day 1 of right TKA  Physical and Occupational Therapy are on consult  Weightbearing as tolerated with walker  ASA for VTE prophylaxis  Labs reviewed  Multimodal pain regime  Home medications ordered for chronic medical conditions as appropriate  Bowel regime  Encourage incentive spirometry  Plans on discharge to home with home health care  Follow-up with Dr. Plummer as directed       I spent 35 minutes in the professional and overall care of this patient.      Connie Gomez PA-C

## 2024-10-01 NOTE — PROGRESS NOTES
Occupational Therapy    Evaluation    Patient Name: Waldo Cohen  MRN: [id number]  Today's Date: 10/1/2024  Time Calculation  Start Time: 0937  Stop Time: 0959  Time Calculation (min): 22 min  4118/4118-A  Eval only     Assessment  IP OT Assessment  Prognosis: Good  Medical Staff Made Aware: Yes  End of Session Communication: Bedside nurse  End of Session Patient Position: Bed, 3 rail up, Alarm on  Patient presents with decline in ADLs, functional transfers, functional mobility and would benefit from OT during acute stay to improve functional independence and safety. Recommend low intensity OT to maximize functional independence and safety.     Plan:  Treatment Interventions: ADL retraining, Functional transfer training, Patient/family training, Equipment evaluation/education, Compensatory technique education  OT Frequency: Daily  OT Discharge Recommendations: Low intensity level of continued care  Equipment Recommended upon Discharge: Wheeled walker (shower chair, reacher, sock aide, LH shoe horn)  OT - OK to Discharge: Yes from acute care OT services to the next level of care when cleared by medical team      Subjective   Current Problem:  1. Status post right knee replacement  acetaminophen (Tylenol) 325 mg tablet    oxyCODONE (Roxicodone) 5 mg immediate release tablet    polyethylene glycol (Glycolax, Miralax) 17 gram packet      2. Coronary artery disease involving native coronary artery of native heart without angina pectoris  aspirin 81 mg EC tablet          General:  General  Reason for Referral: right TKA  Referred By: Daniel Plummer MD  Past Medical History Relevant to Rehab: Admitted 9/30/2024 after having right TKA completed by Dr Plummer. PMH: seizure, CVA  Family/Caregiver Present: Yes (spouse)  Patient Position Received: Up in chair, Alarm on  Preferred Learning Style: verbal  General Comment: Patient seated in bedside chair and agreeable to participate in OT evaluation.    Precautions:  LE Weight Bearing  Status: Weight Bearing as Tolerated  Medical Precautions: Fall precautions  Post-Surgical Precautions: Right total knee precautions    Pain:  Pain Assessment  Pain Assessment: 0-10  0-10 (Numeric) Pain Score:  (reported 2/10 knee pain at rest and 8/10 knee pain with activity)  Pain Interventions: Rest    Objective   Cognition:  Overall Cognitive Status: Within Functional Limits    Home Living:  Home Living Comments: Lives at home with spouse with 1 MICHELLE. Has 1st floor setup.  Has full bathroom on first floor. Has tub shower with transfer bench and GB     Prior Function:  Prior Function Comments: Was independent with all ADLs. Spouse completed IADLs.  Patient was independent without AD functional mobility tasks.  Spouse will be home to assist.    ADL:  UE Dressing Assistance: Stand by (don pull over shirt)  LE Dressing Assistance: Moderate (don undewear and pants)  ADL Comments: Educated patient on safety and comp strategies for LB dressing. Spouse who was at bedside reported that they have reacher, sock aide and LH shoe horn at home    Activity Tolerance:  Endurance: Endurance does not limit participation in activity    Bed Mobility/Transfers:   Bed Mobility  Bed Mobility:  (min assist sit to supine)  Transfers  Transfer:  (CGA sit <>stand with min verbal cues for proper hand placement and technique)  Educated patient on safety with car transfers and patient verbalized understanding.    Educated patient on safety and use of shower chair for safety and patient verbalized understanding. Educated patient on having [facility] assess shower safety prior to completing first shower and patient verbalized understanding.     Ambulation/Gait Training:  Functional Mobility  Functional Mobility Performed:  (CGA with WW functional mobility tasks in room)    Extremities: RUE   RUE : Within Functional Limits and LUE   LUE: Within Functional Limits    Outcome Measures: [facility] Daily Activity  Putting on and taking off regular lower body  clothing: A little  Bathing (including washing, rinsing, drying): A little  Putting on and taking off regular upper body clothing: A little  Toileting, which includes using toilet, bedpan or urinal: A little  Taking care of personal grooming such as brushing teeth: A little  Eating Meals: None  Daily Activity - Total Score: 19    EDUCATION:  Education  Individual(s) Educated: Patient  Education Provided: Fall precautons (safety and comp strategies for LB dressing, safety with transfers)  Patient Response to Education: Patient/Caregiver Verbalized Understanding of Information    Goals:   Encounter Problems       Encounter Problems (Active)       Dressings Lower Extremities       STG - Patient will complete lower body dressing independently with AE and comp strategies  (Progressing)       Start:  10/01/24    Expected End:  10/15/24               Functional Balance       STG-Patient will be independent with assistive device dynamic stand task >5 minutes for ADL completion   (Progressing)       Start:  10/01/24    Expected End:  10/15/24               Functional Mobility       STG-Patient will be independent with assistive device functional mobility tasks   (Progressing)       Start:  10/01/24    Expected End:  10/15/24               OT Transfers       STG - Patient will perform car transfers independently demonstrating good safety  (Progressing)       Start:  10/01/24    Expected End:  10/15/24            STG-Patient will be independent with functional transfers demonstrating good safety   (Progressing)       Start:  10/01/24    Expected End:  10/15/24

## 2024-10-01 NOTE — CARE PLAN
The patient's goals for the shift include      The clinical goals for the shift include Pt will remain safe thoughout the shift.    Problem: Pain - Adult  Goal: Verbalizes/displays adequate comfort level or baseline comfort level  Outcome: Adequate for Discharge     Problem: Safety - Adult  Goal: Free from fall injury  Outcome: Adequate for Discharge     Pt was safely discharged to home.

## 2024-10-01 NOTE — DISCHARGE SUMMARY
Discharge Diagnosis  Osteoarthritis of right knee, unspecified osteoarthritis type    Issues Requiring Follow-Up  Right total knee arthroplasty    Test Results Pending At Discharge  Pending Labs       No current pending labs.            Hospital Course   Mr. Cohen underwent right total knee arthroplasty on 9/30/2024.  Had a successful postoperative period with no complications.  Fully participated in physical and Occupational Therapy.  Used incentive spirometry.  Tolerated a diet with no nausea vomiting.  Voided well.  ASA 81 mg p.o. twice daily initiated for 30 days with patient returning to once a day daily dosing after that.  Patient was provided with a prescription for narcotic pain medications and educated in its appropriate use.  Discharged to home in satisfactory condition with home health care.    Pertinent Physical Exam At Time of Discharge  Physical Exam    Home Medications     Medication List      START taking these medications     acetaminophen 325 mg tablet; Commonly known as: Tylenol; Take 2 tablets   (650 mg) by mouth every 6 hours if needed for mild pain (1 - 3).   oxyCODONE 5 mg immediate release tablet; Commonly known as: Roxicodone;   Take 1 tablet (5 mg) by mouth every 6 hours if needed for severe pain (7 -   10) for up to 7 days.   polyethylene glycol 17 gram packet; Commonly known as: Glycolax,   Miralax; Take 17 g by mouth once daily for 7 days.; Start taking on:   October 2, 2024     CHANGE how you take these medications     aspirin 81 mg EC tablet; Take 1 tablet (81 mg) by mouth 2 times a day.   Resume once daily dosing after 30 days; What changed: when to take this,   additional instructions   atorvastatin 40 mg tablet; Commonly known as: Lipitor; TAKE 1 TABLET   EVERY DAY; What changed: when to take this     CONTINUE taking these medications     brivaracetam 100 mg tablet tablet; Commonly known as: Briviact; Take 1   tablet (100 mg) by mouth 2 times a day.   clopidogrel 75 mg tablet;  Commonly known as: Plavix; Take 1 tablet (75   mg) by mouth once daily.   multivitamin tablet   pantoprazole 40 mg EC tablet; Commonly known as: ProtoNix; TAKE 1 TABLET   EVERY MORNING BEFORE A MEAL     STOP taking these medications     chlorhexidine 0.12 % solution; Commonly known as: Peridex     ASK your doctor about these medications     amoxicillin 500 mg capsule; Commonly known as: Amoxil; Take 4 caps one   hour prior to dental procedure       Outpatient Follow-Up  Future Appointments   Date Time Provider Department Center   11/11/2024 11:00 AM Greg Giron MD [id number] [place]   12/31/2024  2:00 PM Pramod Weathers MD [id number] [place]   1/8/2025  9:30 AM Debi Gaytan MD [id number] [place]   3/24/2025  8:30 AM Christopher Meyers MD [id number] [place]       Connie Gomez [email or web address]

## 2024-10-03 ENCOUNTER — APPOINTMENT (OUTPATIENT)
Dept: CARDIOLOGY | Facility: CLINIC | Age: 74
End: 2024-10-03
Payer: MEDICARE

## 2024-11-02 DIAGNOSIS — K21.9 GASTROESOPHAGEAL REFLUX DISEASE WITHOUT ESOPHAGITIS: ICD-10-CM

## 2024-11-05 RX ORDER — PANTOPRAZOLE SODIUM 40 MG/1
TABLET, DELAYED RELEASE ORAL
Qty: 90 TABLET | Refills: 1 | Status: SHIPPED | OUTPATIENT
Start: 2024-11-05

## 2024-11-06 ENCOUNTER — LAB (OUTPATIENT)
Dept: LAB | Facility: LAB | Age: 74
End: 2024-11-06
Payer: MEDICARE

## 2024-11-06 DIAGNOSIS — D47.3 ESSENTIAL THROMBOCYTHEMIA (MULTI): ICD-10-CM

## 2024-11-06 LAB
ALBUMIN SERPL BCP-MCNC: 4.5 G/DL (ref 3.4–5)
ALP SERPL-CCNC: 56 U/L (ref 33–136)
ALT SERPL W P-5'-P-CCNC: 16 U/L (ref 10–52)
ANION GAP SERPL CALC-SCNC: 12 MMOL/L (ref 10–20)
AST SERPL W P-5'-P-CCNC: 16 U/L (ref 9–39)
BASOPHILS # BLD AUTO: 0.14 X10*3/UL (ref 0–0.1)
BASOPHILS NFR BLD AUTO: 1.3 %
BILIRUB SERPL-MCNC: 1 MG/DL (ref 0–1.2)
BUN SERPL-MCNC: 24 MG/DL (ref 6–23)
CALCIUM SERPL-MCNC: 9.2 MG/DL (ref 8.6–10.3)
CHLORIDE SERPL-SCNC: 101 MMOL/L (ref 98–107)
CO2 SERPL-SCNC: 32 MMOL/L (ref 21–32)
CREAT SERPL-MCNC: 1.06 MG/DL (ref 0.5–1.3)
EGFRCR SERPLBLD CKD-EPI 2021: 75 ML/MIN/1.73M*2
EOSINOPHIL # BLD AUTO: 0.18 X10*3/UL (ref 0–0.4)
EOSINOPHIL NFR BLD AUTO: 1.7 %
ERYTHROCYTE [DISTWIDTH] IN BLOOD BY AUTOMATED COUNT: 16.2 % (ref 11.5–14.5)
GLUCOSE SERPL-MCNC: 89 MG/DL (ref 74–99)
HCT VFR BLD AUTO: 40.9 % (ref 41–52)
HGB BLD-MCNC: 12.7 G/DL (ref 13.5–17.5)
IMM GRANULOCYTES # BLD AUTO: 0.18 X10*3/UL (ref 0–0.5)
IMM GRANULOCYTES NFR BLD AUTO: 1.7 % (ref 0–0.9)
LYMPHOCYTES # BLD AUTO: 0.86 X10*3/UL (ref 0.8–3)
LYMPHOCYTES NFR BLD AUTO: 8.2 %
MCH RBC QN AUTO: 28.1 PG (ref 26–34)
MCHC RBC AUTO-ENTMCNC: 31.1 G/DL (ref 32–36)
MCV RBC AUTO: 91 FL (ref 80–100)
MONOCYTES # BLD AUTO: 0.72 X10*3/UL (ref 0.05–0.8)
MONOCYTES NFR BLD AUTO: 6.9 %
NEUTROPHILS # BLD AUTO: 8.39 X10*3/UL (ref 1.6–5.5)
NEUTROPHILS NFR BLD AUTO: 80.2 %
NRBC BLD-RTO: 0 /100 WBCS (ref 0–0)
PLATELET # BLD AUTO: 534 X10*3/UL (ref 150–450)
POTASSIUM SERPL-SCNC: 5 MMOL/L (ref 3.5–5.3)
PROT SERPL-MCNC: 6.5 G/DL (ref 6.4–8.2)
RBC # BLD AUTO: 4.52 X10*6/UL (ref 4.5–5.9)
SODIUM SERPL-SCNC: 140 MMOL/L (ref 136–145)
WBC # BLD AUTO: 10.5 X10*3/UL (ref 4.4–11.3)

## 2024-11-06 PROCEDURE — 85025 COMPLETE CBC W/AUTO DIFF WBC: CPT

## 2024-11-06 PROCEDURE — 80053 COMPREHEN METABOLIC PANEL: CPT

## 2024-11-06 PROCEDURE — 36415 COLL VENOUS BLD VENIPUNCTURE: CPT

## 2024-11-11 ENCOUNTER — OFFICE VISIT (OUTPATIENT)
Dept: HEMATOLOGY/ONCOLOGY | Facility: CLINIC | Age: 74
End: 2024-11-11
Payer: MEDICARE

## 2024-11-11 VITALS
SYSTOLIC BLOOD PRESSURE: 135 MMHG | OXYGEN SATURATION: 97 % | HEART RATE: 74 BPM | TEMPERATURE: 97.2 F | BODY MASS INDEX: 27.48 KG/M2 | DIASTOLIC BLOOD PRESSURE: 81 MMHG | WEIGHT: 186.07 LBS

## 2024-11-11 DIAGNOSIS — D47.3 ESSENTIAL THROMBOCYTHEMIA (MULTI): Primary | ICD-10-CM

## 2024-11-11 DIAGNOSIS — I48.11 LONGSTANDING PERSISTENT ATRIAL FIBRILLATION (MULTI): ICD-10-CM

## 2024-11-11 DIAGNOSIS — I34.0 MITRAL VALVE INSUFFICIENCY, UNSPECIFIED ETIOLOGY: ICD-10-CM

## 2024-11-11 DIAGNOSIS — R56.9 SEIZURE (MULTI): ICD-10-CM

## 2024-11-11 DIAGNOSIS — G45.9 TIA (TRANSIENT ISCHEMIC ATTACK): ICD-10-CM

## 2024-11-11 PROCEDURE — 1159F MED LIST DOCD IN RCRD: CPT | Performed by: INTERNAL MEDICINE

## 2024-11-11 PROCEDURE — 3075F SYST BP GE 130 - 139MM HG: CPT | Performed by: INTERNAL MEDICINE

## 2024-11-11 PROCEDURE — 1125F AMNT PAIN NOTED PAIN PRSNT: CPT | Performed by: INTERNAL MEDICINE

## 2024-11-11 PROCEDURE — 99214 OFFICE O/P EST MOD 30 MIN: CPT | Performed by: INTERNAL MEDICINE

## 2024-11-11 PROCEDURE — 1157F ADVNC CARE PLAN IN RCRD: CPT | Performed by: INTERNAL MEDICINE

## 2024-11-11 PROCEDURE — 3079F DIAST BP 80-89 MM HG: CPT | Performed by: INTERNAL MEDICINE

## 2024-11-11 PROCEDURE — 99417 PROLNG OP E/M EACH 15 MIN: CPT | Performed by: INTERNAL MEDICINE

## 2024-11-11 RX ORDER — CLOPIDOGREL BISULFATE 75 MG/1
75 TABLET ORAL DAILY
Qty: 90 TABLET | Refills: 3 | Status: SHIPPED | OUTPATIENT
Start: 2024-11-11

## 2024-11-11 ASSESSMENT — PAIN SCALES - GENERAL: PAINLEVEL_OUTOF10: 4

## 2024-11-11 NOTE — PROGRESS NOTES
Patient ID: Waldo Cohen is a 73 y.o. male.  Referring Physician: No referring provider defined for this encounter.  Primary Care Provider: Christopher Meyers MD  Visit Type: Follow Up      Subjective    HPI How was my bloodwork?    Review of Systems   Constitutional: Negative.    HENT:  Negative.     Eyes: Negative.    Respiratory: Negative.     Cardiovascular: Negative.    Gastrointestinal: Negative.    Endocrine: Negative.    Genitourinary: Negative.     Musculoskeletal: Negative.    Skin: Negative.    Neurological: Negative.    Hematological: Negative.    Psychiatric/Behavioral: Negative.          Objective   BSA: 2.03 meters squared  /81 (BP Location: Right arm)   Pulse 74   Temp 36.2 °C (97.2 °F) (Temporal)   Wt 84.4 kg (186 lb 1.1 oz)   SpO2 97%   BMI 27.48 kg/m²      has a past medical history of Arrhythmia, Body mass index (BMI) 28.0-28.9, adult, Cerebral infarction, unspecified (Multi) (10/24/2016), Gastrojejunal ulcer, unspecified as acute or chronic, without hemorrhage or perforation, Heart disease, Heart valve disease, Overweight, Personal history of other diseases of the circulatory system, and Seizure disorder (Multi).   has a past surgical history that includes Other surgical history (02/25/2022); Other surgical history (03/14/2022); MR angio head wo IV contrast (03/01/2022); MR angio neck wo IV contrast (03/01/2022); Cardiac catheterization (N/A, 02/27/2024); Knee Arthroplasty; and Knee Arthroplasty (Right, 09/30/2024).  Family History   Problem Relation Name Age of Onset    Heart disease Mother      Diabetes Mother      Hypertension Mother      Cancer Father       Oncology History    No history exists.       Waldo Cohen  reports that he quit smoking about 40 years ago. His smoking use included cigarettes. He started smoking about 50 years ago. He has a 5 pack-year smoking history. He has never used smokeless tobacco.  He  reports current alcohol use of about 1.0 standard drink of  alcohol per week.  He  reports no history of drug use.    Physical Exam  Vitals reviewed.   Constitutional:       Appearance: Normal appearance.   HENT:      Head: Normocephalic.      Mouth/Throat:      Mouth: Mucous membranes are moist.   Eyes:      Extraocular Movements: Extraocular movements intact.      Pupils: Pupils are equal, round, and reactive to light.   Cardiovascular:      Rate and Rhythm: Normal rate and regular rhythm.      Pulses: Normal pulses.      Heart sounds: Normal heart sounds.   Pulmonary:      Effort: Pulmonary effort is normal.      Breath sounds: Normal breath sounds.   Abdominal:      General: Bowel sounds are normal.      Palpations: Abdomen is soft.   Musculoskeletal:         General: Normal range of motion.      Cervical back: Normal range of motion and neck supple.   Skin:     General: Skin is warm.   Neurological:      General: No focal deficit present.      Mental Status: He is alert and oriented to person, place, and time.   Psychiatric:         Mood and Affect: Mood normal.         Behavior: Behavior normal.         WBC   Date/Time Value Ref Range Status   11/06/2024 10:47 AM 10.5 4.4 - 11.3 x10*3/uL Final   10/01/2024 05:05 AM 15.9 (H) 4.4 - 11.3 x10*3/uL Final   09/16/2024 08:44 AM 11.3 4.4 - 11.3 x10*3/uL Final     nRBC   Date Value Ref Range Status   11/06/2024 0.0 0.0 - 0.0 /100 WBCs Final   10/01/2024 0.0 0.0 - 0.0 /100 WBCs Final   09/16/2024 0.0 0.0 - 0.0 /100 WBCs Final     RBC   Date Value Ref Range Status   11/06/2024 4.52 4.50 - 5.90 x10*6/uL Final   10/01/2024 4.04 (L) 4.50 - 5.90 x10*6/uL Final   09/16/2024 4.91 4.50 - 5.90 x10*6/uL Final     Hemoglobin   Date Value Ref Range Status   11/06/2024 12.7 (L) 13.5 - 17.5 g/dL Final   10/01/2024 11.2 (L) 13.5 - 17.5 g/dL Final   09/16/2024 13.6 13.5 - 17.5 g/dL Final     Hematocrit   Date Value Ref Range Status   11/06/2024 40.9 (L) 41.0 - 52.0 % Final   10/01/2024 35.5 (L) 41.0 - 52.0 % Final   09/16/2024 43.1 41.0 - 52.0  "% Final     MCV   Date/Time Value Ref Range Status   11/06/2024 10:47 AM 91 80 - 100 fL Final   10/01/2024 05:05 AM 88 80 - 100 fL Final   09/16/2024 08:44 AM 88 80 - 100 fL Final     MCH   Date/Time Value Ref Range Status   11/06/2024 10:47 AM 28.1 26.0 - 34.0 pg Final   10/01/2024 05:05 AM 27.7 26.0 - 34.0 pg Final   09/16/2024 08:44 AM 27.7 26.0 - 34.0 pg Final     MCHC   Date/Time Value Ref Range Status   11/06/2024 10:47 AM 31.1 (L) 32.0 - 36.0 g/dL Final   10/01/2024 05:05 AM 31.5 (L) 32.0 - 36.0 g/dL Final   09/16/2024 08:44 AM 31.6 (L) 32.0 - 36.0 g/dL Final     RDW   Date/Time Value Ref Range Status   11/06/2024 10:47 AM 16.2 (H) 11.5 - 14.5 % Final   10/01/2024 05:05 AM 15.9 (H) 11.5 - 14.5 % Final   09/16/2024 08:44 AM 16.1 (H) 11.5 - 14.5 % Final     Platelets   Date/Time Value Ref Range Status   11/06/2024 10:47  (H) 150 - 450 x10*3/uL Final   10/01/2024 05:05  150 - 450 x10*3/uL Final   09/16/2024 08:44  150 - 450 x10*3/uL Final     No results found for: \"MPV\"  Neutrophils %   Date/Time Value Ref Range Status   11/06/2024 10:47 AM 80.2 40.0 - 80.0 % Final   05/10/2024 09:40 AM 81.8 40.0 - 80.0 % Final   11/13/2023 01:25 PM 81.8 40.0 - 80.0 % Final     Immature Granulocytes %, Automated   Date/Time Value Ref Range Status   11/06/2024 10:47 AM 1.7 (H) 0.0 - 0.9 % Final     Comment:     Immature Granulocyte Count (IG) includes promyelocytes, myelocytes and metamyelocytes but does not include bands. Percent differential counts (%) should be interpreted in the context of the absolute cell counts (cells/UL).   05/10/2024 09:40 AM 1.0 (H) 0.0 - 0.9 % Final     Comment:     Immature Granulocyte Count (IG) includes promyelocytes, myelocytes and metamyelocytes but does not include bands. Percent differential counts (%) should be interpreted in the context of the absolute cell counts (cells/UL).   11/13/2023 01:25 PM 2.0 (H) 0.0 - 0.9 % Final     Comment:     Immature Granulocyte Count (IG) " includes promyelocytes, myelocytes and metamyelocytes but does not include bands. Percent differential counts (%) should be interpreted in the context of the absolute cell counts (cells/UL).     Lymphocytes %   Date/Time Value Ref Range Status   11/06/2024 10:47 AM 8.2 13.0 - 44.0 % Final   05/10/2024 09:40 AM 7.1 13.0 - 44.0 % Final   11/13/2023 01:25 PM 6.7 13.0 - 44.0 % Final     Monocytes %   Date/Time Value Ref Range Status   11/06/2024 10:47 AM 6.9 2.0 - 10.0 % Final   05/10/2024 09:40 AM 7.2 2.0 - 10.0 % Final   11/13/2023 01:25 PM 6.9 2.0 - 10.0 % Final     Eosinophils %   Date/Time Value Ref Range Status   11/06/2024 10:47 AM 1.7 0.0 - 6.0 % Final   05/10/2024 09:40 AM 1.6 0.0 - 6.0 % Final   11/13/2023 01:25 PM 1.7 0.0 - 6.0 % Final     Basophils %   Date/Time Value Ref Range Status   11/06/2024 10:47 AM 1.3 0.0 - 2.0 % Final   05/10/2024 09:40 AM 1.3 0.0 - 2.0 % Final   11/13/2023 01:25 PM 0.9 0.0 - 2.0 % Final     Neutrophils Absolute   Date/Time Value Ref Range Status   11/06/2024 10:47 AM 8.39 (H) 1.60 - 5.50 x10*3/uL Final     Comment:     Percent differential counts (%) should be interpreted in the context of the absolute cell counts (cells/uL).   05/10/2024 09:40 AM 8.36 (H) 1.60 - 5.50 x10*3/uL Final     Comment:     Percent differential counts (%) should be interpreted in the context of the absolute cell counts (cells/uL).   11/13/2023 01:25 PM 10.62 (H) 1.20 - 7.70 x10*3/uL Final     Comment:     Percent differential counts (%) should be interpreted in the context of the absolute cell counts (cells/uL).     Immature Granulocytes Absolute, Automated   Date/Time Value Ref Range Status   11/06/2024 10:47 AM 0.18 0.00 - 0.50 x10*3/uL Final   05/10/2024 09:40 AM 0.10 0.00 - 0.50 x10*3/uL Final   11/13/2023 01:25 PM 0.26 0.00 - 0.70 x10*3/uL Final     Lymphocytes Absolute   Date/Time Value Ref Range Status   11/06/2024 10:47 AM 0.86 0.80 - 3.00 x10*3/uL Final   05/10/2024 09:40 AM 0.73 (L) 0.80 - 3.00  "x10*3/uL Final   11/13/2023 01:25 PM 0.87 (L) 1.20 - 4.80 x10*3/uL Final     Monocytes Absolute   Date/Time Value Ref Range Status   11/06/2024 10:47 AM 0.72 0.05 - 0.80 x10*3/uL Final   05/10/2024 09:40 AM 0.74 0.05 - 0.80 x10*3/uL Final   11/13/2023 01:25 PM 0.90 0.10 - 1.00 x10*3/uL Final     Eosinophils Absolute   Date/Time Value Ref Range Status   11/06/2024 10:47 AM 0.18 0.00 - 0.40 x10*3/uL Final   05/10/2024 09:40 AM 0.16 0.00 - 0.40 x10*3/uL Final   11/13/2023 01:25 PM 0.22 0.00 - 0.70 x10*3/uL Final     Basophils Absolute   Date/Time Value Ref Range Status   11/06/2024 10:47 AM 0.14 (H) 0.00 - 0.10 x10*3/uL Final   05/10/2024 09:40 AM 0.13 (H) 0.00 - 0.10 x10*3/uL Final   11/13/2023 01:25 PM 0.12 (H) 0.00 - 0.10 x10*3/uL Final       No components found for: \"PT\"  aPTT   Date/Time Value Ref Range Status   07/25/2023 12:03 AM 31 27 - 38 sec Final     Comment:     Note new reference range as of 6/20/2023 at 10:00am.   07/24/2023 01:15 PM CANCELED       Comment:     Note new reference range as of 6/20/2023 at 10:00am.    Result canceled by the ancillary.     07/24/2023 01:00 PM 39 (H) 27 - 38 sec Final     Comment:     Note new reference range as of 6/20/2023 at 10:00am.     Medication Documentation Review Audit       Reviewed by Kenyatta Henley MA (Medical Assistant) on 11/11/24 at 1057      Medication Order Taking? Sig Documenting Provider Last Dose Status   amoxicillin (Amoxil) 500 mg capsule [id number] No Take 4 caps one hour prior to dental procedure   Patient not taking: Reported on 11/11/2024    Christopher Meyers MD More than a month Active   atorvastatin (Lipitor) 40 mg tablet [id number] Yes TAKE 1 TABLET EVERY DAY Pramod Weathers MD 9/29/2024 Active   brivaracetam (Briviact) 100 mg tablet tablet [id number] Yes Take 1 tablet (100 mg) by mouth 2 times a day. Debi Gaytan MD 9/30/2024 0530 Active   clopidogrel (Plavix) 75 mg tablet [id number] Yes Take 1 tablet (75 mg) by mouth once daily. Rick Huston, " "MD Past Week Active   multivitamin tablet [id number] Yes Take 1 tablet by mouth once daily. Historical Provider, MD Past Week Active   Discontinued 11/05/24 1118   pantoprazole (ProtoNix) 40 mg EC tablet [id number] Yes TAKE 1 TABLET EVERY MORNING BEFORE A MEAL Christopher Meyers MD  Active                   Assessment/Plan    1) essential thrombocythemia  -JAK2+  -no history of thrombosis and his \"TIA\" was considered questionable  -on ASA  -since last visit, has transitioned from coumadin back to ASA + plavix  -doing well, has no complaints  -labs done on 11/6/2024 included CBC + COMP  -results reviewed--wbc 10.5, hgb 12.7, plt 534,000, creatinine 1.06, AST 16, ALT 16  -advised him to continue with ASA +plavix  -limited physical exam was done today--no cervical, supraclavicular, axillary adenopathy  --will see him again in 6 months     2) TIA  -on asa  -on plavix     3) seizure disorder  -on keppra     4) mitral valve regurgitation  -on 7/24/2023 he underwent minimally invasive mitral valve repair with [place] near cords and 36 mm [employer] Simuform ring  -after surgery he apparently went into atrial fibrillation and is currently on coumadin     Problem List Items Addressed This Visit             ICD-10-CM    Essential thrombocythemia (Multi) D47.3    Relevant Orders    Clinic Appointment Request Follow Up; MESERET SARMIENTO; [facility] [id number]    CBC and Auto Differential    Comprehensive metabolic panel            Greg Giron MD                         "

## 2024-11-12 ASSESSMENT — ENCOUNTER SYMPTOMS
PSYCHIATRIC NEGATIVE: 1
GASTROINTESTINAL NEGATIVE: 1
MUSCULOSKELETAL NEGATIVE: 1
RESPIRATORY NEGATIVE: 1
CONSTITUTIONAL NEGATIVE: 1
CARDIOVASCULAR NEGATIVE: 1
NEUROLOGICAL NEGATIVE: 1
EYES NEGATIVE: 1
HEMATOLOGIC/LYMPHATIC NEGATIVE: 1
ENDOCRINE NEGATIVE: 1

## 2024-11-13 ENCOUNTER — HOSPITAL ENCOUNTER (EMERGENCY)
Facility: HOSPITAL | Age: 74
Discharge: HOME | End: 2024-11-13
Attending: STUDENT IN AN ORGANIZED HEALTH CARE EDUCATION/TRAINING PROGRAM
Payer: MEDICARE

## 2024-11-13 ENCOUNTER — APPOINTMENT (OUTPATIENT)
Dept: CARDIOLOGY | Facility: HOSPITAL | Age: 74
End: 2024-11-13
Payer: MEDICARE

## 2024-11-13 ENCOUNTER — APPOINTMENT (OUTPATIENT)
Dept: RADIOLOGY | Facility: HOSPITAL | Age: 74
End: 2024-11-13
Payer: MEDICARE

## 2024-11-13 VITALS
WEIGHT: 186 LBS | HEIGHT: 69 IN | HEART RATE: 87 BPM | SYSTOLIC BLOOD PRESSURE: 138 MMHG | OXYGEN SATURATION: 97 % | BODY MASS INDEX: 27.55 KG/M2 | TEMPERATURE: 97.7 F | RESPIRATION RATE: 16 BRPM | DIASTOLIC BLOOD PRESSURE: 86 MMHG

## 2024-11-13 DIAGNOSIS — N20.0 KIDNEY STONE ON LEFT SIDE: Primary | ICD-10-CM

## 2024-11-13 LAB
ABO GROUP (TYPE) IN BLOOD: NORMAL
ALBUMIN SERPL BCP-MCNC: 4.7 G/DL (ref 3.4–5)
ALP SERPL-CCNC: 60 U/L (ref 33–136)
ALT SERPL W P-5'-P-CCNC: 13 U/L (ref 10–52)
ANION GAP SERPL CALC-SCNC: 13 MMOL/L (ref 10–20)
ANTIBODY SCREEN: NORMAL
APPEARANCE UR: CLEAR
AST SERPL W P-5'-P-CCNC: 18 U/L (ref 9–39)
ATRIAL RATE: 72 BPM
BASOPHILS # BLD AUTO: 0.11 X10*3/UL (ref 0–0.1)
BASOPHILS NFR BLD AUTO: 0.5 %
BILIRUB SERPL-MCNC: 1.5 MG/DL (ref 0–1.2)
BILIRUB UR STRIP.AUTO-MCNC: NEGATIVE MG/DL
BUN SERPL-MCNC: 22 MG/DL (ref 6–23)
CALCIUM SERPL-MCNC: 9.3 MG/DL (ref 8.6–10.3)
CHLORIDE SERPL-SCNC: 95 MMOL/L (ref 98–107)
CO2 SERPL-SCNC: 30 MMOL/L (ref 21–32)
COLOR UR: YELLOW
CREAT SERPL-MCNC: 1.5 MG/DL (ref 0.5–1.3)
EGFRCR SERPLBLD CKD-EPI 2021: 49 ML/MIN/1.73M*2
EOSINOPHIL # BLD AUTO: 0.03 X10*3/UL (ref 0–0.4)
EOSINOPHIL NFR BLD AUTO: 0.1 %
ERYTHROCYTE [DISTWIDTH] IN BLOOD BY AUTOMATED COUNT: 15.8 % (ref 11.5–14.5)
GLUCOSE SERPL-MCNC: 120 MG/DL (ref 74–99)
GLUCOSE UR STRIP.AUTO-MCNC: NORMAL MG/DL
HCT VFR BLD AUTO: 43.3 % (ref 41–52)
HGB BLD-MCNC: 14.2 G/DL (ref 13.5–17.5)
HOLD SPECIMEN: NORMAL
IMM GRANULOCYTES # BLD AUTO: 0.23 X10*3/UL (ref 0–0.5)
IMM GRANULOCYTES NFR BLD AUTO: 1.1 % (ref 0–0.9)
INR PPP: 1.1 (ref 0.9–1.1)
KETONES UR STRIP.AUTO-MCNC: NEGATIVE MG/DL
LACTATE SERPL-SCNC: 1.4 MMOL/L (ref 0.4–2)
LEUKOCYTE ESTERASE UR QL STRIP.AUTO: NEGATIVE
LIPASE SERPL-CCNC: 23 U/L (ref 9–82)
LYMPHOCYTES # BLD AUTO: 0.45 X10*3/UL (ref 0.8–3)
LYMPHOCYTES NFR BLD AUTO: 2.1 %
MCH RBC QN AUTO: 28.5 PG (ref 26–34)
MCHC RBC AUTO-ENTMCNC: 32.8 G/DL (ref 32–36)
MCV RBC AUTO: 87 FL (ref 80–100)
MONOCYTES # BLD AUTO: 1.27 X10*3/UL (ref 0.05–0.8)
MONOCYTES NFR BLD AUTO: 6 %
MUCOUS THREADS #/AREA URNS AUTO: ABNORMAL /LPF
NEUTROPHILS # BLD AUTO: 19.24 X10*3/UL (ref 1.6–5.5)
NEUTROPHILS NFR BLD AUTO: 90.2 %
NITRITE UR QL STRIP.AUTO: NEGATIVE
NRBC BLD-RTO: 0 /100 WBCS (ref 0–0)
P AXIS: 29 DEGREES
P OFFSET: 180 MS
P ONSET: 127 MS
PH UR STRIP.AUTO: 5.5 [PH]
PLATELET # BLD AUTO: 564 X10*3/UL (ref 150–450)
POTASSIUM SERPL-SCNC: 4.5 MMOL/L (ref 3.5–5.3)
PR INTERVAL: 190 MS
PROT SERPL-MCNC: 6.9 G/DL (ref 6.4–8.2)
PROT UR STRIP.AUTO-MCNC: ABNORMAL MG/DL
PROTHROMBIN TIME: 12.7 SECONDS (ref 9.8–12.8)
Q ONSET: 222 MS
QRS COUNT: 12 BEATS
QRS DURATION: 100 MS
QT INTERVAL: 404 MS
QTC CALCULATION(BAZETT): 442 MS
QTC FREDERICIA: 429 MS
R AXIS: 38 DEGREES
RBC # BLD AUTO: 4.99 X10*6/UL (ref 4.5–5.9)
RBC # UR STRIP.AUTO: ABNORMAL /UL
RBC #/AREA URNS AUTO: ABNORMAL /HPF
RH FACTOR (ANTIGEN D): NORMAL
SODIUM SERPL-SCNC: 133 MMOL/L (ref 136–145)
SP GR UR STRIP.AUTO: 1.02
T AXIS: 41 DEGREES
T OFFSET: 424 MS
UROBILINOGEN UR STRIP.AUTO-MCNC: NORMAL MG/DL
VENTRICULAR RATE: 72 BPM
WBC # BLD AUTO: 21.3 X10*3/UL (ref 4.4–11.3)
WBC #/AREA URNS AUTO: ABNORMAL /HPF

## 2024-11-13 PROCEDURE — 82565 ASSAY OF CREATININE: CPT | Performed by: EMERGENCY MEDICINE

## 2024-11-13 PROCEDURE — 74174 CTA ABD&PLVS W/CONTRAST: CPT | Performed by: RADIOLOGY

## 2024-11-13 PROCEDURE — 96375 TX/PRO/DX INJ NEW DRUG ADDON: CPT | Mod: 59

## 2024-11-13 PROCEDURE — 83690 ASSAY OF LIPASE: CPT | Performed by: EMERGENCY MEDICINE

## 2024-11-13 PROCEDURE — 2500000001 HC RX 250 WO HCPCS SELF ADMINISTERED DRUGS (ALT 637 FOR MEDICARE OP): Performed by: STUDENT IN AN ORGANIZED HEALTH CARE EDUCATION/TRAINING PROGRAM

## 2024-11-13 PROCEDURE — 85610 PROTHROMBIN TIME: CPT | Performed by: EMERGENCY MEDICINE

## 2024-11-13 PROCEDURE — 81001 URINALYSIS AUTO W/SCOPE: CPT | Performed by: EMERGENCY MEDICINE

## 2024-11-13 PROCEDURE — 96374 THER/PROPH/DIAG INJ IV PUSH: CPT | Mod: 59

## 2024-11-13 PROCEDURE — 99285 EMERGENCY DEPT VISIT HI MDM: CPT | Mod: 25

## 2024-11-13 PROCEDURE — 86901 BLOOD TYPING SEROLOGIC RH(D): CPT | Performed by: EMERGENCY MEDICINE

## 2024-11-13 PROCEDURE — 2550000001 HC RX 255 CONTRASTS: Performed by: STUDENT IN AN ORGANIZED HEALTH CARE EDUCATION/TRAINING PROGRAM

## 2024-11-13 PROCEDURE — 93005 ELECTROCARDIOGRAM TRACING: CPT

## 2024-11-13 PROCEDURE — 36415 COLL VENOUS BLD VENIPUNCTURE: CPT | Performed by: EMERGENCY MEDICINE

## 2024-11-13 PROCEDURE — 74174 CTA ABD&PLVS W/CONTRAST: CPT

## 2024-11-13 PROCEDURE — 85025 COMPLETE CBC W/AUTO DIFF WBC: CPT | Performed by: EMERGENCY MEDICINE

## 2024-11-13 PROCEDURE — P9612 CATHETERIZE FOR URINE SPEC: HCPCS

## 2024-11-13 PROCEDURE — 2500000004 HC RX 250 GENERAL PHARMACY W/ HCPCS (ALT 636 FOR OP/ED): Performed by: EMERGENCY MEDICINE

## 2024-11-13 PROCEDURE — 83605 ASSAY OF LACTIC ACID: CPT | Performed by: EMERGENCY MEDICINE

## 2024-11-13 RX ORDER — MORPHINE SULFATE 4 MG/ML
4 INJECTION, SOLUTION INTRAMUSCULAR; INTRAVENOUS ONCE
Status: DISCONTINUED | OUTPATIENT
Start: 2024-11-13 | End: 2024-11-13

## 2024-11-13 RX ORDER — TAMSULOSIN HYDROCHLORIDE 0.4 MG/1
0.4 CAPSULE ORAL DAILY
Qty: 7 CAPSULE | Refills: 0 | Status: SHIPPED | OUTPATIENT
Start: 2024-11-13

## 2024-11-13 RX ORDER — ONDANSETRON 4 MG/1
4 TABLET, ORALLY DISINTEGRATING ORAL EVERY 6 HOURS PRN
Qty: 12 TABLET | Refills: 0 | Status: SHIPPED | OUTPATIENT
Start: 2024-11-13 | End: 2024-11-16

## 2024-11-13 RX ORDER — LEVOFLOXACIN 500 MG/1
500 TABLET, FILM COATED ORAL ONCE
Status: DISCONTINUED | OUTPATIENT
Start: 2024-11-13 | End: 2024-11-13

## 2024-11-13 RX ORDER — OXYCODONE HYDROCHLORIDE 5 MG/1
5 TABLET ORAL EVERY 6 HOURS PRN
Qty: 12 TABLET | Refills: 0 | Status: SHIPPED | OUTPATIENT
Start: 2024-11-13 | End: 2024-11-16

## 2024-11-13 RX ORDER — MORPHINE SULFATE 4 MG/ML
4 INJECTION, SOLUTION INTRAMUSCULAR; INTRAVENOUS ONCE
Status: COMPLETED | OUTPATIENT
Start: 2024-11-13 | End: 2024-11-13

## 2024-11-13 RX ORDER — OXYCODONE AND ACETAMINOPHEN 5; 325 MG/1; MG/1
1 TABLET ORAL ONCE
Status: COMPLETED | OUTPATIENT
Start: 2024-11-13 | End: 2024-11-13

## 2024-11-13 RX ORDER — ONDANSETRON HYDROCHLORIDE 2 MG/ML
4 INJECTION, SOLUTION INTRAVENOUS ONCE
Status: COMPLETED | OUTPATIENT
Start: 2024-11-13 | End: 2024-11-13

## 2024-11-13 ASSESSMENT — LIFESTYLE VARIABLES
EVER FELT BAD OR GUILTY ABOUT YOUR DRINKING: NO
HAVE YOU EVER FELT YOU SHOULD CUT DOWN ON YOUR DRINKING: NO
HAVE PEOPLE ANNOYED YOU BY CRITICIZING YOUR DRINKING: NO
TOTAL SCORE: 0
EVER HAD A DRINK FIRST THING IN THE MORNING TO STEADY YOUR NERVES TO GET RID OF A HANGOVER: NO

## 2024-11-13 ASSESSMENT — PAIN DESCRIPTION - LOCATION: LOCATION: ABDOMEN

## 2024-11-13 ASSESSMENT — PAIN SCALES - GENERAL
PAINLEVEL_OUTOF10: 8
PAINLEVEL_OUTOF10: 8
PAINLEVEL_OUTOF10: 0 - NO PAIN
PAINLEVEL_OUTOF10: 5 - MODERATE PAIN

## 2024-11-13 ASSESSMENT — COLUMBIA-SUICIDE SEVERITY RATING SCALE - C-SSRS
1. IN THE PAST MONTH, HAVE YOU WISHED YOU WERE DEAD OR WISHED YOU COULD GO TO SLEEP AND NOT WAKE UP?: NO
2. HAVE YOU ACTUALLY HAD ANY THOUGHTS OF KILLING YOURSELF?: NO
6. HAVE YOU EVER DONE ANYTHING, STARTED TO DO ANYTHING, OR PREPARED TO DO ANYTHING TO END YOUR LIFE?: NO

## 2024-11-13 ASSESSMENT — PAIN - FUNCTIONAL ASSESSMENT: PAIN_FUNCTIONAL_ASSESSMENT: 0-10

## 2024-11-13 NOTE — DISCHARGE INSTRUCTIONS
Please return to the ER or seek immediate medical attention if you experience new or worsening abdominal pain, persistent vomiting, persistent diarrhea, black tar stools, fever of 38C (100.4) or higher, chest pain, shortness of breath, or worsening of your current symptoms.    You are welcome back any time. Thank you for entrusting your care to us, I hope we made your visit as pleasant as possible. Wishing you well!    Dr. Sánchez

## 2024-11-13 NOTE — ED PROVIDER NOTES
EMERGENCY DEPARTMENT ENCOUNTER      Pt Name: Waldo Cohen  MRN: [id number]  Birthdate 1950  Date of evaluation: 11/13/2024  Provider: Enrique Sánchez DO    CHIEF COMPLAINT       Chief Complaint   Patient presents with    Difficulty Urinating     HISTORY OF PRESENT ILLNESS    Waldo Cohen is a 73 y.o. year old male who presents to the ER for abdominal pain, sharp like a kife, started yesterday. Eased up some. Started suprapubic, migrated to LUQ, now back to suprapubic... Today became oliguric, + nausea, + heaving, no stool changes, chest pain, or fevers or flank pain. No hematuria or dysuria.  PMH AF , epilepsy, GI bleed from NSAIDS  PSH MV repair, watchman, bilateral knee replacements. On ASA/Plavix.  Last knee replacement 09/30  All: demerol - nausea  Former tobacco, occasional alcohol, no drugs     PAST MEDICAL HISTORY     Past Medical History:   Diagnosis Date    Arrhythmia     afib    Body mass index (BMI) 28.0-28.9, adult     BMI 28.0-28.9,adult    Cerebral infarction, unspecified (Multi) 10/24/2016    Cryptogenic stroke    Gastrojejunal ulcer, unspecified as acute or chronic, without hemorrhage or perforation     Sepsis-related gastrointestinal ulceration    Heart disease     Heart valve disease     Overweight     Overweight (BMI 25.0-29.9)    Personal history of other diseases of the circulatory system     History of coronary atherosclerosis    Seizure disorder (Multi)      CURRENT MEDICATIONS       Previous Medications    AMOXICILLIN (AMOXIL) 500 MG CAPSULE    Take 4 caps one hour prior to dental procedure    ATORVASTATIN (LIPITOR) 40 MG TABLET    TAKE 1 TABLET EVERY DAY    BRIVARACETAM (BRIVIACT) 100 MG TABLET TABLET    Take 1 tablet (100 mg) by mouth 2 times a day.    CLOPIDOGREL (PLAVIX) 75 MG TABLET    Take 1 tablet (75 mg) by mouth once daily.    MULTIVITAMIN TABLET    Take 1 tablet by mouth once daily.    PANTOPRAZOLE (PROTONIX) 40 MG EC TABLET    TAKE 1 TABLET EVERY MORNING BEFORE A MEAL      SURGICAL HISTORY       Past Surgical History:   Procedure Laterality Date    CARDIAC CATHETERIZATION N/A 2024    Procedure: LAAO (Left Atrial Appendage Occlusion);  Surgeon: Golden Bray MD;  Location: [address] Cardiac Cath Lab;  Service: Cardiovascular;  Laterality: N/A;  Same Day CT 1000    KNEE ARTHROPLASTY      KNEE ARTHROPLASTY Right 2024    MR HEAD ANGIO WO IV CONTRAST  2022    MR HEAD ANGIO WO IV CONTRAST 3/1/2022 [facility]    MR NECK ANGIO WO IV CONTRAST  2022    MR NECK ANGIO WO IV CONTRAST 3/1/2022 [facility]    OTHER SURGICAL HISTORY  2022    Finger amputation    OTHER SURGICAL HISTORY  2022    Colonoscopy     ALLERGIES     Demerol [meperidine]  FAMILY HISTORY       Family History   Problem Relation Name Age of Onset    Heart disease Mother      Diabetes Mother      Hypertension Mother      Cancer Father       SOCIAL HISTORY       Social History     Tobacco Use    Smoking status: Former     Current packs/day: 0.00     Average packs/day: 0.5 packs/day for 10.0 years (5.0 ttl pk-yrs)     Types: Cigarettes     Start date:      Quit date:      Years since quittin.8    Smokeless tobacco: Never   Vaping Use    Vaping status: Never Used   Substance Use Topics    Alcohol use: Yes     Alcohol/week: 1.0 standard drink of alcohol     Types: 1 Shots of liquor per week    Drug use: Never     PHYSICAL EXAM  (up to 7 for level 4, 8 or more for level 5)     ED Triage Vitals   Temp Pulse Resp BP   -- -- -- --      SpO2 Temp src Heart Rate Source Patient Position   -- -- -- --      BP Location FiO2 (%)     -- --       Physical Exam  Vitals and nursing note reviewed.   Constitutional:       General: He is not in acute distress.     Appearance: Normal appearance. He is not ill-appearing.   HENT:      Head: Normocephalic and atraumatic. No raccoon eyes, Mancuso's sign, contusion or laceration.      Jaw: No trismus or malocclusion.      Right Ear:  External ear normal.      Left Ear: External ear normal.      Mouth/Throat:      Mouth: Mucous membranes are moist.      Pharynx: Oropharynx is clear.   Eyes:      Extraocular Movements: Extraocular movements intact.      Pupils: Pupils are equal, round, and reactive to light.   Neck:      Trachea: No tracheal deviation.   Cardiovascular:      Rate and Rhythm: Normal rate and regular rhythm.      Pulses: Normal pulses.   Pulmonary:      Effort: No respiratory distress.      Breath sounds: No wheezing, rhonchi or rales.   Chest:      Chest wall: No tenderness.   Abdominal:      General: Abdomen is flat.      Palpations: Abdomen is soft. There is no mass.      Tenderness: There is abdominal tenderness (RLQ/LLQ).   Musculoskeletal:         General: No tenderness or signs of injury.      Right lower leg: No edema.      Left lower leg: No edema.   Skin:     Coloration: Skin is not jaundiced or pale.      Findings: No petechiae, rash or wound.   Neurological:      Mental Status: He is alert.   Psychiatric:         Speech: Speech normal.         Thought Content: Thought content does not include homicidal or suicidal ideation.        DIAGNOSTIC RESULTS   LABS:  Labs Reviewed   CBC WITH AUTO DIFFERENTIAL - Abnormal       Result Value    WBC 21.3 (*)     nRBC 0.0      RBC 4.99      Hemoglobin 14.2      Hematocrit 43.3      MCV 87      MCH 28.5      MCHC 32.8      RDW 15.8 (*)     Platelets 564 (*)     Neutrophils % 90.2      Immature Granulocytes %, Automated 1.1 (*)     Lymphocytes % 2.1      Monocytes % 6.0      Eosinophils % 0.1      Basophils % 0.5      Neutrophils Absolute 19.24 (*)     Immature Granulocytes Absolute, Automated 0.23      Lymphocytes Absolute 0.45 (*)     Monocytes Absolute 1.27 (*)     Eosinophils Absolute 0.03      Basophils Absolute 0.11 (*)    COMPREHENSIVE METABOLIC PANEL - Abnormal    Glucose 120 (*)     Sodium 133 (*)     Potassium 4.5      Chloride 95 (*)     Bicarbonate 30      Anion Gap 13       Urea Nitrogen 22      Creatinine 1.50 (*)     eGFR 49 (*)     Calcium 9.3      Albumin 4.7      Alkaline Phosphatase 60      Total Protein 6.9      AST 18      Bilirubin, Total 1.5 (*)     ALT 13     URINALYSIS WITH REFLEX CULTURE AND MICROSCOPIC - Abnormal    Color, Urine Yellow      Appearance, Urine Clear      Specific Gravity, Urine 1.023      pH, Urine 5.5      Protein, Urine 10 (TRACE)      Glucose, Urine Normal      Blood, Urine 0.2 (2+) (*)     Ketones, Urine NEGATIVE      Bilirubin, Urine NEGATIVE      Urobilinogen, Urine Normal      Nitrite, Urine NEGATIVE      Leukocyte Esterase, Urine NEGATIVE     URINALYSIS MICROSCOPIC WITH REFLEX CULTURE - Abnormal    WBC, Urine 1-5      RBC, Urine 6-10 (*)     Mucus, Urine FEW     PROTIME-INR - Normal    Protime 12.7      INR 1.1     LIPASE - Normal    Lipase 23      Narrative:     Venipuncture immediately after or during the administration of Metamizole may lead to falsely low results. Testing should be performed immediately prior to Metamizole dosing.   LACTATE - Normal    Lactate 1.4      Narrative:     Venipuncture immediately after or during the administration of Metamizole may lead to falsely low results. Testing should be performed immediately prior to Metamizole dosing.   TYPE AND SCREEN    ABO TYPE A      Rh TYPE POS      ANTIBODY SCREEN NEG     URINALYSIS WITH REFLEX CULTURE AND MICROSCOPIC    Narrative:     The following orders were created for panel order Urinalysis with Reflex Culture and Microscopic.  Procedure                               Abnormality         Status                     ---------                               -----------         ------                     Urinalysis with Reflex C...[[id number]]  Abnormal            Final result               Extra Urine Gray Tube[[id number]]                            In process                   Please view results for these tests on the individual orders.   EXTRA URINE GRAY TUBE     All other labs were  "within normal range or not returned as of this dictation.  Imaging  CT angio abdomen pelvis w and or wo IV IV contrast   Final Result   1. 6 mm distal left ureteral stone. Mild left hydronephrosis and   hydroureter.   2. Splenomegaly.   3. No acute vascular abnormality.        MACRO:   none        Signed by: Golden Savage 11/13/2024 11:41 AM   Dictation workstation:   [id number]         Procedure  Procedures  EMERGENCY DEPARTMENT COURSE/MDM:   Medical Decision Making    Vitals:    Vitals:    11/13/24 0833 11/13/24 1000 11/13/24 1100   BP: (!) 155/94 149/83 (!) 165/95   Pulse: 90 80 87   Resp: 16 14 16   Temp: 36.5 °C (97.7 °F)     SpO2: 100% 97% 98%   Weight: 84.4 kg (186 lb)     Height: 1.753 m (5' 9\")       Waldo Cohen is a male 73 y.o. who presents to the ER for abdominal pain, does have a history of AF. On arrival the patients vital signs were: Afebrile, Reguar HR, Normotensive, Regular RR, and Normoxic on room air. History obtained from: patient and Spouse.  Plan for abdominal pain workup, morphine for analgesia, Zofran for emesis,, CT angio given A-fib not on anticoagulation  ED Course as of 11/13/24 1230   Wed Nov 13, 2024   1159 Urinalysis with Reflex Culture and Microscopic(!)  No bacteria, nitrite, or leukocyte esterase to suggest UTI. No ketonuria. No glucosuria. [CB]   1200 CT angio abdomen pelvis w and or wo IV IV contrast  1. 6 mm distal left ureteral stone. Mild left hydronephrosis and  hydroureter.  2. Splenomegaly.  3. No acute vascular abnormality.       [CB]   1200 Comprehensive metabolic panel(!)  CINDY, mild hyponatremia, no additional significant electrolyte or hepatic abnormality [CB]   1200 Protime-INR  No coagulopathy [CB]   1200 LIPASE: 23  Not greater than 3 times upper normal limit doubt pancreatitis [CB]   1229 Given CINDY, kidney stone, no UTI discussed results and admission for observation versus discharge with urology follow-up, patient elected to be discharged.  Pain is adequately " controlled at this time. [CB]      ED Course User Index  [CB] Enrique Sánchez DO         Diagnoses as of 11/13/24 1230   Kidney stone on left side     External Records Reviewed: I reviewed recent and relevant outside records including inpatient notes, outpatient records  Prescription Drug Consideration: Oxy prescribed for breakthrough pain, Zofran for antiemesis, Flomax for assistance in passing kidney stone.  Patient has history of GI bleed related to NSAIDs, Toradol/naproxen deferred    Shared decision making for disposition  Patient and/or patient´s representative was counseled regarding labs, imaging, likely diagnosis. All questions were answered. Recommendation was made   for discharge home. The patient agreed and was discharged home in stable condition with appropriate relevant educational materials. Return precautions were provided which included new or worsening abdominal pain, persistent vomiting, persistent diarrhea, black tar stools, fever of 38C (100.4) or higher, chest pain, shortness of breath, or worsening of your current symptoms..     ED Medications administered this visit:    Medications   ondansetron (Zofran) injection 4 mg (4 mg intravenous Given 11/13/24 0911)   morphine injection 4 mg (4 mg intravenous Given 11/13/24 0911)   iohexol (OMNIPaque) 350 mg iodine/mL solution 90 mL (90 mL intravenous Given 11/13/24 1023)       New Prescriptions from this visit:    New Prescriptions    ONDANSETRON ODT (ZOFRAN-ODT) 4 MG DISINTEGRATING TABLET    Take 1 tablet (4 mg) by mouth every 6 hours if needed for nausea or vomiting for up to 3 days.    OXYCODONE (ROXICODONE) 5 MG IMMEDIATE RELEASE TABLET    Take 1 tablet (5 mg) by mouth every 6 hours if needed (for breakthrough pain) for up to 3 days.    TAMSULOSIN (FLOMAX) 0.4 MG 24 HR CAPSULE    Take 1 capsule (0.4 mg) by mouth once daily.       Follow-up:  Kailash Mckeon MD  [address], [facility] [address]  [place] [id number] [phone]               Final Impression:   1. Kidney stone on left side          Please excuse any misspellings or unintended errors related to the Dragon speech recognition software used to dictate this note.    I reviewed the case with the attending ED physician. The attending ED physician agrees with the plan.      Enrique Sánchez DO  Resident  11/13/24 6830

## 2024-11-15 ENCOUNTER — OFFICE VISIT (OUTPATIENT)
Dept: UROLOGY | Facility: CLINIC | Age: 74
End: 2024-11-15
Payer: MEDICARE

## 2024-11-15 VITALS — DIASTOLIC BLOOD PRESSURE: 69 MMHG | SYSTOLIC BLOOD PRESSURE: 99 MMHG | TEMPERATURE: 97.4 F | HEART RATE: 96 BPM

## 2024-11-15 DIAGNOSIS — N20.0 KIDNEY STONE ON LEFT SIDE: ICD-10-CM

## 2024-11-15 DIAGNOSIS — N17.9 ACUTE KIDNEY INJURY (CMS-HCC): Primary | ICD-10-CM

## 2024-11-15 PROCEDURE — G2211 COMPLEX E/M VISIT ADD ON: HCPCS | Performed by: UROLOGY

## 2024-11-15 PROCEDURE — 1160F RVW MEDS BY RX/DR IN RCRD: CPT | Performed by: UROLOGY

## 2024-11-15 PROCEDURE — 3074F SYST BP LT 130 MM HG: CPT | Performed by: UROLOGY

## 2024-11-15 PROCEDURE — 1036F TOBACCO NON-USER: CPT | Performed by: UROLOGY

## 2024-11-15 PROCEDURE — 3078F DIAST BP <80 MM HG: CPT | Performed by: UROLOGY

## 2024-11-15 PROCEDURE — 1157F ADVNC CARE PLAN IN RCRD: CPT | Performed by: UROLOGY

## 2024-11-15 PROCEDURE — 1159F MED LIST DOCD IN RCRD: CPT | Performed by: UROLOGY

## 2024-11-15 PROCEDURE — 99204 OFFICE O/P NEW MOD 45 MIN: CPT | Performed by: UROLOGY

## 2024-11-15 NOTE — PROGRESS NOTES
PRIOR NOTES  73-year-old male here to see me regarding nephrolithiasis  Hyperlipidemia, atrial fibrillation, CAD, essential thrombocytosis, prior TIA, seizure  On Plavix  BPH on Flomax  UA 6-10 RBCs  Creatinine 1.50 from baseline 0.88    CT angio chest/abdomen/pelvis personally viewed and interpreted.  Noncontrasted images reviewed: Patient has left-sided hydro ureteronephrosis with perinephric stranding, dilation of the ureter down to the left ureterovesical junction where there is a 5 mm long stone    ER 10/1 and 11/13    Pt reports since leaving the hospital he has had some pressure in LLQ, saw some residue passed yesterday and feels a bit better. No fevers or chills. Still some nausea. Using zofran. Using oxycodone and tylenol.     Prior spontaneous stone passage x 1    Past Medical History  He has a past medical history of Arrhythmia, Body mass index (BMI) 28.0-28.9, adult, Cerebral infarction, unspecified (Multi) (10/24/2016), Gastrojejunal ulcer, unspecified as acute or chronic, without hemorrhage or perforation, Heart disease, Heart valve disease, Overweight, Personal history of other diseases of the circulatory system, and Seizure disorder (Multi).    Surgical History  He has a past surgical history that includes Other surgical history (02/25/2022); Other surgical history (03/14/2022); MR angio head wo IV contrast (03/01/2022); MR angio neck wo IV contrast (03/01/2022); Cardiac catheterization (N/A, 02/27/2024); Knee Arthroplasty; and Knee Arthroplasty (Right, 09/30/2024).     Social History  He reports that he quit smoking about 40 years ago. His smoking use included cigarettes. He started smoking about 50 years ago. He has a 5 pack-year smoking history. He has never used smokeless tobacco. He reports current alcohol use of about 1.0 standard drink of alcohol per week. He reports that he does not use drugs.    Family History  Family History   Problem Relation Name Age of Onset    Heart disease Mother       Diabetes Mother      Hypertension Mother      Cancer Father          Allergies  Demerol [meperidine]    ROS: 12 system review was completed and is negative with the exception of those signs and symptoms noted in the history of present illness: A 12 system review was completed and is negative with the exception of those signs and symptoms noted in the history of present illness.     Exam:  General: in NAD, appears stated age  Head: normocephalic, atraumatic  Respiratory: normal effort, no use of accessory muscles  Cardiovascular: no edema noted  Skin: normal turgor, no rashes  Neurologic: grossly intact, oriented to person/place/time  Psychiatric: mode and affect appropriate     Last Recorded Vitals  Blood pressure 99/69, pulse 96, temperature 36.3 °C (97.4 °F).    Lab Results   Component Value Date    PSASCREEN 0.21 03/28/2024    CREATININE 1.50 (H) 11/13/2024    HGB 14.2 11/13/2024         ASSESSMENT/PLAN:  #Nephrolithiasis, CINDY  - continue flomax  - continue tylenol prn  - strain urine  - check CT in 2 weeks  - discussed indication for stone treatment if fevers, chills, poor pain control  -Described ureteroscopy and laser lithotripsy in detail including risks and common side effects, stent related pain, UTI  -Basic metabolic panel 2 weeks

## 2024-11-19 LAB
ATRIAL RATE: 72 BPM
P AXIS: 29 DEGREES
P OFFSET: 180 MS
P ONSET: 127 MS
PR INTERVAL: 190 MS
Q ONSET: 222 MS
QRS COUNT: 12 BEATS
QRS DURATION: 100 MS
QT INTERVAL: 404 MS
QTC CALCULATION(BAZETT): 442 MS
QTC FREDERICIA: 429 MS
R AXIS: 38 DEGREES
T AXIS: 41 DEGREES
T OFFSET: 424 MS
VENTRICULAR RATE: 72 BPM

## 2024-11-20 ENCOUNTER — TELEPHONE (OUTPATIENT)
Dept: NEUROLOGY | Facility: CLINIC | Age: 74
End: 2024-11-20
Payer: MEDICARE

## 2024-11-20 DIAGNOSIS — G40.909 SEIZURE DISORDER (MULTI): ICD-10-CM

## 2024-11-26 ENCOUNTER — LAB (OUTPATIENT)
Dept: LAB | Facility: LAB | Age: 74
End: 2024-11-26
Payer: MEDICARE

## 2024-11-26 DIAGNOSIS — N20.0 KIDNEY STONE ON LEFT SIDE: ICD-10-CM

## 2024-11-26 DIAGNOSIS — N17.9 ACUTE KIDNEY INJURY (CMS-HCC): ICD-10-CM

## 2024-11-26 LAB
ANION GAP SERPL CALC-SCNC: 11 MMOL/L (ref 10–20)
BUN SERPL-MCNC: 22 MG/DL (ref 6–23)
CALCIUM SERPL-MCNC: 9.3 MG/DL (ref 8.6–10.3)
CHLORIDE SERPL-SCNC: 104 MMOL/L (ref 98–107)
CO2 SERPL-SCNC: 30 MMOL/L (ref 21–32)
CREAT SERPL-MCNC: 1.22 MG/DL (ref 0.5–1.3)
EGFRCR SERPLBLD CKD-EPI 2021: 63 ML/MIN/1.73M*2
GLUCOSE SERPL-MCNC: 107 MG/DL (ref 74–99)
POTASSIUM SERPL-SCNC: 4.6 MMOL/L (ref 3.5–5.3)
SODIUM SERPL-SCNC: 140 MMOL/L (ref 136–145)

## 2024-11-26 PROCEDURE — 80048 BASIC METABOLIC PNL TOTAL CA: CPT

## 2024-11-26 PROCEDURE — 36415 COLL VENOUS BLD VENIPUNCTURE: CPT

## 2024-11-29 ENCOUNTER — HOSPITAL ENCOUNTER (OUTPATIENT)
Dept: RADIOLOGY | Facility: HOSPITAL | Age: 74
Discharge: HOME | End: 2024-11-29
Payer: MEDICARE

## 2024-11-29 DIAGNOSIS — N20.0 KIDNEY STONE ON LEFT SIDE: ICD-10-CM

## 2024-11-29 PROCEDURE — 74176 CT ABD & PELVIS W/O CONTRAST: CPT

## 2024-12-03 ENCOUNTER — TELEPHONE (OUTPATIENT)
Dept: UROLOGY | Facility: HOSPITAL | Age: 74
End: 2024-12-03
Payer: MEDICARE

## 2024-12-03 NOTE — TELEPHONE ENCOUNTER
Called and informed patient of CT results, no nephrolithiasis remaining.  No hydroureteronephrosis.  No need for surgical intervention at this time.

## 2024-12-31 ENCOUNTER — APPOINTMENT (OUTPATIENT)
Dept: CARDIOLOGY | Facility: CLINIC | Age: 74
End: 2024-12-31
Payer: MEDICARE

## 2024-12-31 VITALS
DIASTOLIC BLOOD PRESSURE: 64 MMHG | WEIGHT: 185 LBS | BODY MASS INDEX: 27.4 KG/M2 | SYSTOLIC BLOOD PRESSURE: 110 MMHG | HEART RATE: 60 BPM | HEIGHT: 69 IN

## 2024-12-31 DIAGNOSIS — I25.10 CORONARY ARTERY DISEASE INVOLVING NATIVE CORONARY ARTERY OF NATIVE HEART WITHOUT ANGINA PECTORIS: Primary | ICD-10-CM

## 2024-12-31 DIAGNOSIS — I48.0 PAROXYSMAL ATRIAL FIBRILLATION (MULTI): ICD-10-CM

## 2024-12-31 DIAGNOSIS — Z95.818 PRESENCE OF WATCHMAN LEFT ATRIAL APPENDAGE CLOSURE DEVICE: ICD-10-CM

## 2024-12-31 DIAGNOSIS — I35.0 NONRHEUMATIC AORTIC VALVE STENOSIS: ICD-10-CM

## 2024-12-31 DIAGNOSIS — E78.5 DYSLIPIDEMIA: ICD-10-CM

## 2024-12-31 DIAGNOSIS — I34.0 MITRAL VALVE INSUFFICIENCY, UNSPECIFIED ETIOLOGY: ICD-10-CM

## 2024-12-31 PROBLEM — I10 ESSENTIAL (PRIMARY) HYPERTENSION: Status: RESOLVED | Noted: 2023-02-06 | Resolved: 2024-12-31

## 2024-12-31 PROCEDURE — 1036F TOBACCO NON-USER: CPT | Performed by: INTERNAL MEDICINE

## 2024-12-31 PROCEDURE — 1126F AMNT PAIN NOTED NONE PRSNT: CPT | Performed by: INTERNAL MEDICINE

## 2024-12-31 PROCEDURE — 99214 OFFICE O/P EST MOD 30 MIN: CPT | Performed by: INTERNAL MEDICINE

## 2024-12-31 PROCEDURE — 1160F RVW MEDS BY RX/DR IN RCRD: CPT | Performed by: INTERNAL MEDICINE

## 2024-12-31 PROCEDURE — 1159F MED LIST DOCD IN RCRD: CPT | Performed by: INTERNAL MEDICINE

## 2024-12-31 PROCEDURE — 1157F ADVNC CARE PLAN IN RCRD: CPT | Performed by: INTERNAL MEDICINE

## 2024-12-31 PROCEDURE — 3008F BODY MASS INDEX DOCD: CPT | Performed by: INTERNAL MEDICINE

## 2024-12-31 RX ORDER — ASPIRIN 81 MG/1
81 TABLET ORAL DAILY
COMMUNITY

## 2024-12-31 ASSESSMENT — PAIN SCALES - GENERAL: PAINLEVEL_OUTOF10: 0-NO PAIN

## 2024-12-31 NOTE — PROGRESS NOTES
Chief Complaint:   No chief complaint on file.     History Of Present Illness:    Waldo Cohen is a 74 y.o. male with a h/o aortic stenosis, mitral regurgitation s/p repair, paroxysmal atrial fibrillation s/p Watchman, essential thrombocythemia on aspirin, pulmonary hypertension, TIA, dyslipidemia, and seizure disorder here for routine follow-up     Continues to be doing well.  Recovered from his knee replacement.    Denies any palpitations, lightheadedness, chest pain or leg swelling.     CT watchman 6/27/2024: Left atrial appendage is not patent.  No evidence of left atrial thrombus.     Limited echocardiogram 2/27/2024: EF 60 to 65%.  Abnormal septal motion consistent with postoperative state.  Moderate mitral annular calcification.     Watchman implant 2/27/2024: 31 mm Watchman FLX.  Instructed to use clopidogrel for 6 months and aspirin indefinitely.     Echocardiogram 7/31/2023: EF 50 to 55% with septal motion consistent with postoperative state. S/p MV repair with annular ring. Mean gradient 6 mmHg with heart rate 69 bpm. Mild aortic stenosis.     Mitral valve repair 7/24/2023: Resuspension of cords and 36 mm [employer] mitral annular ring     Left and right heart catheterization 7/10/2023: Mid LAD 60% stenosis. Significant V wave consistent with severe MR. Cardiac output and index 6.6 and 3.3. RVSP 65 mmHg     SPENCER 6/30/2023: EF 55 to 60%. 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 Severe MR. Moderate mitral valve prolapse. RVSP estimated at 14.6 mmHg.     48-hour Holter monitor June 2023: Predominantly sinus rhythm with 2-1 AV block and second-degree type I AV block. PVCs 1%. 2 episodes of nonsustained VT with the longest 9 beats and the fastest 127 bpm. Less than 1% PACs.     Allergies:  Demerol [meperidine]    Outpatient Medications:  Current Outpatient Medications   Medication Instructions    amoxicillin (Amoxil) 500 mg capsule Take 4 caps one hour prior to dental procedure    aspirin 81 mg, oral,  "Daily    atorvastatin (LIPITOR) 40 mg, oral, Daily    brivaracetam (BRIVIACT) 100 mg, oral, 2 times daily    clopidogrel (PLAVIX) 75 mg, oral, Daily    multivitamin tablet 1 tablet, Daily    pantoprazole (ProtoNix) 40 mg EC tablet TAKE 1 TABLET EVERY MORNING BEFORE A MEAL       Last Recorded Vitals:  Visit Vitals  /64 (BP Location: Right arm, Patient Position: Sitting, BP Cuff Size: Adult)   Pulse 60   Ht 1.753 m (5' 9\")   Wt 83.9 kg (185 lb)   BMI 27.32 kg/m²   Smoking Status Former   BSA 2.02 m²      LASTWT(3):   Wt Readings from Last 3 Encounters:   12/31/24 83.9 kg (185 lb)   11/13/24 84.4 kg (186 lb)   11/11/24 84.4 kg (186 lb 1.1 oz)       Physical Exam:  HEENT: Carotid upstrokes normal with referred bilateral heart murmur. JVP is normal.  Pulmonary: Clear to auscultation bilaterally.  Cardiovascular: S1, S2, regular. II/VI early peaking crescendo decrescendo murmur at the right upper sternal border. No rubs or gallops.   Lower extremities: Warm. 2+ distal pulses. No edema.       Last Labs:  CBC -  Recent Labs     11/13/24  0917 11/06/24  1047 10/01/24  0505   WBC 21.3* 10.5 15.9*   HGB 14.2 12.7* 11.2*   HCT 43.3 40.9* 35.5*   * 534* 340   MCV 87 91 88       CMP -  Recent Labs     11/26/24  1423 11/13/24  0917 11/06/24  1047 11/13/23  1325 08/08/23  1042 08/01/23  0500 07/31/23  0733    133* 140   < > 140 CANCELED 138   K 4.6 4.5 5.0   < > 5.8* CANCELED 5.1    95* 101   < > 102 CANCELED 99   CO2 30 30 32   < > 30 CANCELED 31   ANIONGAP 11 13 12   < > 14 CANCELED 13   BUN 22 22 24*   < > 21 CANCELED 30*   CREATININE 1.22 1.50* 1.06   < > 1.17 CANCELED 1.28   EGFR 63 49* 75   < >  --   --   --    MG  --   --   --   --  2.07 CANCELED 2.13    < > = values in this interval not displayed.     Recent Labs     11/13/24  0917 11/06/24  1047 06/17/24  0836 05/10/24  0940   ALBUMIN 4.7 4.5 4.3 4.2   ALKPHOS 60 56  --  45   ALT 13 16  --  19   AST 18 16  --  19   BILITOT 1.5* 1.0  --  0.9 "       LIPID PANEL -   Recent Labs     03/28/24  0723 04/26/23  0815 03/01/22  1504 08/26/21  0710   CHOL 116 164 180 172   LDLCALC 65  --   --   --    LDLF  --  120* 123* 121*   HDL 25.8 26.2* 25.0* 27.0*   TRIG 128 87 158* 118       Recent Labs     09/16/24  0844 03/01/22  1504   BNP  --  140*   HGBA1C 5.4 5.3           Assessment/Plan   1) CAD: 50-60 % stenosis of the LAD.  No signs or symptoms to suggest progression of underlying coronary disease.  We will continue to observe conservatively.     2) paroxysmal atrial fibrillation: Watchman device placed February 2024.  Instructed to stay on clopidogrel for 6 months and aspirin indefinitely for the watchman.  He needs to stay on his clopidogrel and aspirin indefinitely because of his essential thrombocytosis.     3) aortic stenosis: Mild by most recent echocardiogram.  We will be repeating an echocardiogram next year to reassess his mitral valve repair.  We will reassess the aortic valve at that time.     4) mitral regurgitation: S/p mitral valve repair with 38 mm ring and reattachment of cords.      5) essential thrombocytosis: Continue with clopidogrel and aspirin as per hematology.     6) dyslipidemia: LDL very well-controlled.  Continue atorvastatin 40 mg daily     7) follow-up: We will repeat the echocardiogram next summer and then follow-up with him shortly thereafter.        Pramod Weathers MD

## 2025-01-08 ENCOUNTER — APPOINTMENT (OUTPATIENT)
Dept: NEUROLOGY | Facility: CLINIC | Age: 75
End: 2025-01-08
Payer: MEDICARE

## 2025-01-08 VITALS
HEART RATE: 65 BPM | HEIGHT: 69 IN | WEIGHT: 187.3 LBS | BODY MASS INDEX: 27.74 KG/M2 | TEMPERATURE: 97.4 F | DIASTOLIC BLOOD PRESSURE: 84 MMHG | SYSTOLIC BLOOD PRESSURE: 120 MMHG

## 2025-01-08 DIAGNOSIS — G40.909 SEIZURE DISORDER (MULTI): Primary | ICD-10-CM

## 2025-01-08 DIAGNOSIS — G43.109 MIGRAINE WITH AURA AND WITHOUT STATUS MIGRAINOSUS, NOT INTRACTABLE: ICD-10-CM

## 2025-01-08 DIAGNOSIS — R56.9 SEIZURE (MULTI): ICD-10-CM

## 2025-01-08 PROCEDURE — 3008F BODY MASS INDEX DOCD: CPT | Performed by: STUDENT IN AN ORGANIZED HEALTH CARE EDUCATION/TRAINING PROGRAM

## 2025-01-08 PROCEDURE — 1157F ADVNC CARE PLAN IN RCRD: CPT | Performed by: STUDENT IN AN ORGANIZED HEALTH CARE EDUCATION/TRAINING PROGRAM

## 2025-01-08 PROCEDURE — 1036F TOBACCO NON-USER: CPT | Performed by: STUDENT IN AN ORGANIZED HEALTH CARE EDUCATION/TRAINING PROGRAM

## 2025-01-08 PROCEDURE — 99214 OFFICE O/P EST MOD 30 MIN: CPT | Performed by: STUDENT IN AN ORGANIZED HEALTH CARE EDUCATION/TRAINING PROGRAM

## 2025-01-08 PROCEDURE — 1160F RVW MEDS BY RX/DR IN RCRD: CPT | Performed by: STUDENT IN AN ORGANIZED HEALTH CARE EDUCATION/TRAINING PROGRAM

## 2025-01-08 PROCEDURE — G2211 COMPLEX E/M VISIT ADD ON: HCPCS | Performed by: STUDENT IN AN ORGANIZED HEALTH CARE EDUCATION/TRAINING PROGRAM

## 2025-01-08 PROCEDURE — 1159F MED LIST DOCD IN RCRD: CPT | Performed by: STUDENT IN AN ORGANIZED HEALTH CARE EDUCATION/TRAINING PROGRAM

## 2025-01-08 ASSESSMENT — LIFESTYLE VARIABLES
AUDIT-C TOTAL SCORE: 2
HOW OFTEN DO YOU HAVE SIX OR MORE DRINKS ON ONE OCCASION: NEVER
HOW MANY STANDARD DRINKS CONTAINING ALCOHOL DO YOU HAVE ON A TYPICAL DAY: 1 OR 2
HOW OFTEN DO YOU HAVE A DRINK CONTAINING ALCOHOL: 2-4 TIMES A MONTH
SKIP TO QUESTIONS 9-10: 1

## 2025-01-08 NOTE — PROGRESS NOTES
[facility]   Referring: No ref. provider found  PCP: Christopher Meyers MD  Chief Complaint   Patient presents with    Seizures       Subjective   Waldo Cohen is a 74 y.o. year old male presenting for visit for follow-up .     He was last seen 7/23/2024.     He has a history of focal epilepsy. At his last visit, we discussed new spells concerning for focal seizure vs migraine aura. He developed R facial pressure, with associated nausea and R hemibody tingling. Spells last minutes up to 1-2 hours. MRI brain was negative for acute stroke. EEG showed cortical dysfunction in the L temporal lobe but no epileptiform discharges or seizures. Briviact was increased to 100mg BID.     He has since had a R knee replacement in September 2024. He is recovering but still has stiffness. He also had a watchman device implanted february of 2024.     Since the Briviact was increased, he has had no further spells. He is tolerating this well. He has no side effects.     He has multiple family members who had dementia, grandmother aged 96 and mother at age 93. He has no current cognitive concerns and is functionally independent.     Patient Active Problem List   Diagnosis    Personal history of surgery to heart and great vessels, presenting hazards to health    Coronary artery disease involving native coronary artery of native heart without angina pectoris    Nonrheumatic aortic valve stenosis    Essential thrombocytosis    Dyslipidemia    Paroxysmal atrial fibrillation (Multi)    Presence of Watchman left atrial appendage closure device    Hyperglycemia, unspecified    Hyperkalemia    Essential thrombocythemia (Multi)    TIA (transient ischemic attack)    Seizure disorder (Multi)    Mitral valve insufficiency    Osteoarthritis of right knee, unspecified osteoarthritis type    Migraine with aura and without status migrainosus, not intractable     Objective   Neurological Exam  Mental Status  Awake, alert and  oriented to person, place and time. Speech is normal.    Cranial Nerves  CN III, IV, VI: Extraocular movements intact bilaterally.  CN VII: Full and symmetric facial movement.  CN VIII: Hearing is normal.    Motor   Strength is 5/5 throughout all four extremities.    Gait  Casual gait is normal including stance, stride, and arm swing.    Physical Exam  Eyes:      Extraocular Movements: Extraocular movements intact.   Neurological:      Motor: Motor strength is normal.  Psychiatric:         Speech: Speech normal.       Assessment/Plan   Diagnoses and all orders for this visit:  Seizure disorder (Multi)  Migraine with aura and without status migrainosus, not intractable  Seizure (Multi)    Seizure disorder: spells of R sided head pressure, nausea and R hand paresthesias. Last minutes to 1-2 hours. Since resolved with Briviact 100mg BID. Suspect focal seizures based on response to briviact though migrainous aura also possible. CSA signed 1/8/2025. UDS ordered. Will continue Briviact 500mg BID.   Memory concerns: strong family history of memory loss. Interested in pursuing rehab trials for healthy controls for memory loss/brain health. Email sent to our research team regarding potential trials    Follow-up in 6-8 months     There are no Patient Instructions on file for this visit.

## 2025-01-09 ENCOUNTER — LAB (OUTPATIENT)
Dept: LAB | Facility: LAB | Age: 75
End: 2025-01-09
Payer: MEDICARE

## 2025-01-09 DIAGNOSIS — G40.909 SEIZURE DISORDER (MULTI): ICD-10-CM

## 2025-01-09 LAB
AMPHETAMINES UR QL SCN: NORMAL
BARBITURATES UR QL SCN: NORMAL
BENZODIAZ UR QL SCN: NORMAL
BZE UR QL SCN: NORMAL
CANNABINOIDS UR QL SCN: NORMAL
FENTANYL+NORFENTANYL UR QL SCN: NORMAL
METHADONE UR QL SCN: NORMAL
OPIATES UR QL SCN: NORMAL
OXYCODONE+OXYMORPHONE UR QL SCN: NORMAL
PCP UR QL SCN: NORMAL

## 2025-01-09 PROCEDURE — 80307 DRUG TEST PRSMV CHEM ANLYZR: CPT

## 2025-02-03 DIAGNOSIS — I35.0 NONRHEUMATIC AORTIC VALVE STENOSIS: ICD-10-CM

## 2025-02-03 NOTE — TELEPHONE ENCOUNTER
Rx Refill Request Telephone Encounter    Patient has dental cleaning coming up on 25 and needs this prior to dental work    Name:  Waldo Cohen  :  [id number]  Medication Name:  amoxicillin  Specific Pharmacy location:  [facility]  Date of last appointment:  3/21/24  Date of next appointment:  3/24/25  Best number to reach patient:

## 2025-02-04 RX ORDER — AMOXICILLIN 500 MG/1
CAPSULE ORAL
Qty: 4 CAPSULE | Refills: 1 | Status: SHIPPED | OUTPATIENT
Start: 2025-02-04

## 2025-02-14 ENCOUNTER — APPOINTMENT (OUTPATIENT)
Dept: UROLOGY | Facility: CLINIC | Age: 75
End: 2025-02-14
Payer: MEDICARE

## 2025-02-14 VITALS — TEMPERATURE: 95.9 F | HEART RATE: 72 BPM | SYSTOLIC BLOOD PRESSURE: 125 MMHG | DIASTOLIC BLOOD PRESSURE: 73 MMHG

## 2025-02-14 DIAGNOSIS — N20.0 KIDNEY STONE ON LEFT SIDE: Primary | ICD-10-CM

## 2025-02-14 PROCEDURE — 1159F MED LIST DOCD IN RCRD: CPT | Performed by: UROLOGY

## 2025-02-14 PROCEDURE — 99214 OFFICE O/P EST MOD 30 MIN: CPT | Performed by: UROLOGY

## 2025-02-14 PROCEDURE — G2211 COMPLEX E/M VISIT ADD ON: HCPCS | Performed by: UROLOGY

## 2025-02-14 PROCEDURE — 1157F ADVNC CARE PLAN IN RCRD: CPT | Performed by: UROLOGY

## 2025-02-14 RX ORDER — POTASSIUM CITRATE 15 MEQ/1
45 TABLET, EXTENDED RELEASE ORAL SEE ADMIN INSTRUCTIONS
Qty: 180 TABLET | Refills: 6 | Status: SHIPPED | OUTPATIENT
Start: 2025-02-14

## 2025-02-14 ASSESSMENT — PATIENT HEALTH QUESTIONNAIRE - PHQ9
1. LITTLE INTEREST OR PLEASURE IN DOING THINGS: NOT AT ALL
SUM OF ALL RESPONSES TO PHQ9 QUESTIONS 1 AND 2: 0
2. FEELING DOWN, DEPRESSED OR HOPELESS: NOT AT ALL

## 2025-02-14 ASSESSMENT — ENCOUNTER SYMPTOMS
OCCASIONAL FEELINGS OF UNSTEADINESS: 0
LOSS OF SENSATION IN FEET: 0

## 2025-02-14 NOTE — PROGRESS NOTES
PAST UROLOGICAL HISTORY:  History of nephrolithiasis with prior spontaneous stone passage 3 years ago. Recent left UVJ stone measuring 5mm with associated hydronephrosis noted on CT in 10/2024. ER visits on 10/01/2024 and 11/13/2024. 24-hour urine analysis in 02/2023 showed low volume (1.2L) and low citrate (89).    HPI TODAY 02/14/2025:    Nephrolithiasis:  - CT scan on 11/29/2024 shows prior left UVJ stone has passed  - No residual stones in collecting system  - Creatinine improved to 1.22 from prior 1.50  - Currently asymptomatic    PAST MEDICAL HISTORY:  - Hyperlipidemia  - Atrial fibrillation  - CAD  - Essential thrombocytosis  - Prior TIA  - Seizure disorder  - BPH on Flomax    REVIEW OF SYSTEMS: A tailored review of systems was performed and all pertinent positives and negatives are listed in the HPI.    PHYSICAL EXAMINATION:  Gen: NAD  Pulm: No increased WOB on RA  Cards: WWP    ASSESSMENT/PLAN:    Nephrolithiasis (N20.0)  - Assessment: Recent passage of left UVJ stone. Low urine volume and citrate on 24-hour collection suggesting possible renal tubular acidosis.  - Plan: Starting potassium citrate 15mEq TID with meals. Goal to increase fluid intake to 2.5L daily. Annual follow-up with ultrasound, X-ray, and urine studies.  - Counseling: Discussed importance of increased fluid intake. Reviewed medication side effects including GI distress and difficulty swallowing large pills. Alternative treatment with sodium bicarbonate discussed if cost prohibitive.    The patient provided verbal consent for the audio recording of today's encounter using AI.

## 2025-03-10 ENCOUNTER — TELEPHONE (OUTPATIENT)
Dept: PRIMARY CARE | Facility: CLINIC | Age: 75
End: 2025-03-10
Payer: MEDICARE

## 2025-03-10 DIAGNOSIS — K21.9 GASTROESOPHAGEAL REFLUX DISEASE WITHOUT ESOPHAGITIS: ICD-10-CM

## 2025-03-10 DIAGNOSIS — I10 ESSENTIAL (PRIMARY) HYPERTENSION: ICD-10-CM

## 2025-03-10 RX ORDER — PANTOPRAZOLE SODIUM 40 MG/1
40 TABLET, DELAYED RELEASE ORAL
Qty: 90 TABLET | Refills: 3 | Status: SHIPPED | OUTPATIENT
Start: 2025-03-10

## 2025-03-10 NOTE — TELEPHONE ENCOUNTER
Rx Refill Request Telephone Encounter    Name:  Waldo Cohen  :  [id number]  Medication Name:      pantoprazole (ProtoNix) 40 mg EC tablet    Specific Pharmacy location:      [facility], [address] [address]  Phone: [phone]  Fax: [phone]      Date of last appointment:    Date of next appointment:  3/24/25  Best number to reach patient:  [phone]

## 2025-03-12 RX ORDER — ATORVASTATIN CALCIUM 40 MG/1
40 TABLET, FILM COATED ORAL DAILY
Qty: 90 TABLET | Refills: 3 | Status: SHIPPED | OUTPATIENT
Start: 2025-03-12

## 2025-03-24 ENCOUNTER — APPOINTMENT (OUTPATIENT)
Dept: PRIMARY CARE | Facility: CLINIC | Age: 75
End: 2025-03-24
Payer: MEDICARE

## 2025-03-24 VITALS
WEIGHT: 186 LBS | SYSTOLIC BLOOD PRESSURE: 116 MMHG | BODY MASS INDEX: 28.19 KG/M2 | TEMPERATURE: 97.9 F | HEIGHT: 68 IN | DIASTOLIC BLOOD PRESSURE: 80 MMHG | HEART RATE: 69 BPM

## 2025-03-24 DIAGNOSIS — I10 ESSENTIAL (PRIMARY) HYPERTENSION: ICD-10-CM

## 2025-03-24 DIAGNOSIS — E66.3 OVERWEIGHT WITH BODY MASS INDEX (BMI) OF 28 TO 28.9 IN ADULT: ICD-10-CM

## 2025-03-24 DIAGNOSIS — Z87.891 FORMER SMOKER: ICD-10-CM

## 2025-03-24 DIAGNOSIS — Z12.5 PROSTATE CANCER SCREENING: ICD-10-CM

## 2025-03-24 DIAGNOSIS — E78.5 HYPERLIPIDEMIA, UNSPECIFIED HYPERLIPIDEMIA TYPE: ICD-10-CM

## 2025-03-24 DIAGNOSIS — Z00.00 MEDICARE ANNUAL WELLNESS VISIT, SUBSEQUENT: ICD-10-CM

## 2025-03-24 PROCEDURE — 3074F SYST BP LT 130 MM HG: CPT | Performed by: FAMILY MEDICINE

## 2025-03-24 PROCEDURE — 1170F FXNL STATUS ASSESSED: CPT | Performed by: FAMILY MEDICINE

## 2025-03-24 PROCEDURE — 3079F DIAST BP 80-89 MM HG: CPT | Performed by: FAMILY MEDICINE

## 2025-03-24 PROCEDURE — 1036F TOBACCO NON-USER: CPT | Performed by: FAMILY MEDICINE

## 2025-03-24 PROCEDURE — 1157F ADVNC CARE PLAN IN RCRD: CPT | Performed by: FAMILY MEDICINE

## 2025-03-24 PROCEDURE — 1158F ADVNC CARE PLAN TLK DOCD: CPT | Performed by: FAMILY MEDICINE

## 2025-03-24 PROCEDURE — 1160F RVW MEDS BY RX/DR IN RCRD: CPT | Performed by: FAMILY MEDICINE

## 2025-03-24 PROCEDURE — 1123F ACP DISCUSS/DSCN MKR DOCD: CPT | Performed by: FAMILY MEDICINE

## 2025-03-24 PROCEDURE — 3008F BODY MASS INDEX DOCD: CPT | Performed by: FAMILY MEDICINE

## 2025-03-24 PROCEDURE — 1159F MED LIST DOCD IN RCRD: CPT | Performed by: FAMILY MEDICINE

## 2025-03-24 PROCEDURE — G0439 PPPS, SUBSEQ VISIT: HCPCS | Performed by: FAMILY MEDICINE

## 2025-03-24 RX ORDER — LORATADINE 10 MG/1
10 TABLET ORAL DAILY
COMMUNITY

## 2025-03-24 ASSESSMENT — ENCOUNTER SYMPTOMS
PSYCHIATRIC NEGATIVE: 1
ALLERGIC/IMMUNOLOGIC NEGATIVE: 1
MUSCULOSKELETAL NEGATIVE: 1
HEMATOLOGIC/LYMPHATIC NEGATIVE: 1
GASTROINTESTINAL NEGATIVE: 1
CARDIOVASCULAR NEGATIVE: 1
EYES NEGATIVE: 1
COUGH: 1
CONSTITUTIONAL NEGATIVE: 1
ENDOCRINE NEGATIVE: 1

## 2025-03-24 ASSESSMENT — ACTIVITIES OF DAILY LIVING (ADL)
BATHING: INDEPENDENT
GROCERY_SHOPPING: INDEPENDENT
TAKING_MEDICATION: INDEPENDENT
DOING_HOUSEWORK: INDEPENDENT
MANAGING_FINANCES: INDEPENDENT
DRESSING: INDEPENDENT

## 2025-03-24 ASSESSMENT — PATIENT HEALTH QUESTIONNAIRE - PHQ9
SUM OF ALL RESPONSES TO PHQ9 QUESTIONS 1 AND 2: 0
2. FEELING DOWN, DEPRESSED OR HOPELESS: NOT AT ALL
1. LITTLE INTEREST OR PLEASURE IN DOING THINGS: NOT AT ALL

## 2025-03-24 NOTE — PROGRESS NOTES
Subjective     Patient ID: Waldo Choen is a 74 y.o. male who presents for Medicare Annual Wellness Visit Subsequent (AWV - since 1st of February has had a dry cough and congestion but controlling with Claritan):    Problem List Items Addressed This Visit    None     Past Medical History:   Diagnosis Date    Allergic     Arrhythmia     afib    Body mass index (BMI) 28.0-28.9, adult     BMI 28.0-28.9,adult    Cerebral infarction, unspecified 10/24/2016    Cryptogenic stroke    Gastrojejunal ulcer, unspecified as acute or chronic, without hemorrhage or perforation     Sepsis-related gastrointestinal ulceration    GERD (gastroesophageal reflux disease) 1/1999    Heart disease     Heart valve disease     HL (hearing loss) 8/2024    Overweight     Overweight (BMI 25.0-29.9)    Personal history of other diseases of the circulatory system     History of coronary atherosclerosis    Seizure disorder (Multi)       Past Surgical History:   Procedure Laterality Date    CARDIAC CATHETERIZATION N/A 02/27/2024    Procedure: LAAO (Left Atrial Appendage Occlusion);  Surgeon: Golden Bray MD;  Location: [address] Cardiac Cath Lab;  Service: Cardiovascular;  Laterality: N/A;  Same Day CT 1000    KNEE ARTHROPLASTY      KNEE ARTHROPLASTY Right 09/30/2024    MR HEAD ANGIO WO IV CONTRAST  03/01/2022    MR HEAD ANGIO WO IV CONTRAST 3/1/2022 [facility]    MR NECK ANGIO WO IV CONTRAST  03/01/2022    MR NECK ANGIO WO IV CONTRAST 3/1/2022 [facility]    OTHER SURGICAL HISTORY  02/25/2022    Finger amputation    OTHER SURGICAL HISTORY  03/14/2022    Colonoscopy      Family History   Problem Relation Name Age of Onset    Heart disease Mother Betzy     Diabetes Mother Betzy     Hypertension Mother Betzy     Stroke Mother Betzy     Dementia Mother Betzy     Heart failure Mother Betzy     Cancer Father Gibsonynfran       Social History     Socioeconomic History    Marital status:      Spouse name: Not on file     Number of children: Not on file    Years of education: Not on file    Highest education level: Not on file   Occupational History    Not on file   Tobacco Use    Smoking status: Former     Current packs/day: 0.00     Average packs/day: 0.5 packs/day for 10.0 years (5.0 ttl pk-yrs)     Types: Cigarettes     Start date: 1974     Quit date:      Years since quittin.2    Smokeless tobacco: Never   Vaping Use    Vaping status: Never Used   Substance and Sexual Activity    Alcohol use: Yes     Alcohol/week: 1.0 standard drink of alcohol     Types: 1 Shots of liquor per week    Drug use: Never    Sexual activity: Not Currently     Partners: Female   Other Topics Concern    Not on file   Social History Narrative    Not on file     Social Drivers of Health     Financial Resource Strain: Patient Declined (2024)    Overall Financial Resource Strain (CARDIA)     Difficulty of Paying Living Expenses: Patient declined   Food Insecurity: Patient Declined (2024)    Hunger Vital Sign     Worried About Running Out of Food in the Last Year: Patient declined     Ran Out of Food in the Last Year: Patient declined   Transportation Needs: Patient Declined (2024)    PRAPARE - Transportation     Lack of Transportation (Medical): Patient declined     Lack of Transportation (Non-Medical): Patient declined   Physical Activity: Not on file   Stress: Not on file   Social Connections: Not on file   Intimate Partner Violence: Not At Risk (2024)    Humiliation, Afraid, Rape, and Kick questionnaire     Fear of Current or Ex-Partner: No     Emotionally Abused: No     Physically Abused: No     Sexually Abused: No   Housing Stability: Patient Declined (2024)    Housing Stability Vital Sign     Unable to Pay for Housing in the Last Year: Patient declined     Number of Times Moved in the Last Year: 1     Homeless in the Last Year: Patient declined      Demerol [meperidine]   Current Outpatient Medications    Medication Sig Dispense Refill    amoxicillin (Amoxil) 500 mg capsule Take 4 caps one hour prior to dental procedure (Patient taking differently: if needed. Take 4 caps one hour prior to dental procedure) 4 capsule 1    aspirin 81 mg EC tablet Take 1 tablet (81 mg) by mouth once daily.      atorvastatin (Lipitor) 40 mg tablet Take 1 tablet (40 mg) by mouth once daily. 90 tablet 3    brivaracetam (Briviact) 100 mg tablet tablet Take 1 tablet (100 mg) by mouth 2 times a day. 180 tablet 1    loratadine (Claritin) 10 mg tablet Take 1 tablet (10 mg) by mouth once daily.      multivitamin tablet Take 1 tablet by mouth once daily.      pantoprazole (ProtoNix) 40 mg EC tablet Take 1 tablet (40 mg) by mouth once daily in the morning. Take before meals. Do not crush, chew, or split. 90 tablet 3    potassium citrate CR (Urocit-K-15) 15 mEq ER tablet Take 3 tablets (45 mEq) by mouth see administration instructions. Take 2 tablets in morning and 1 tablet at night to equal 45 meq daily 180 tablet 6     No current facility-administered medications for this visit.       Immunization History   Administered Date(s) Administered    COVID-19, mRNA, LNP-S, PF, 30 mcg/0.3 mL dose 04/28/2021, 05/19/2021, 12/03/2021    Flu vaccine, trivalent, preservative free, HIGH-DOSE, age 65y+ (Fluzone) 10/01/2018, 09/27/2022    Influenza, Unspecified 10/15/2010, 09/01/2015, 10/04/2016, 09/01/2021, 10/25/2023    Influenza, seasonal, injectable 10/01/2021    Moderna SARS-CoV-2 Vaccination 08/05/2022    PPD Test 07/11/2018, 07/20/2020    Pneumococcal Conjugate PCV 7 03/21/2019    Pneumococcal polysaccharide vaccine, 23-valent, age 2 years and older (PNEUMOVAX 23) 10/01/2020, 09/27/2022        Review of Systems     Vitals:    03/24/25 0826   BP: 116/80   Pulse: 69   Temp: 36.6 °C (97.9 °F)     Vitals:    03/24/25 0826   Weight: 84.4 kg (186 lb)      Physical Exam     ASSESSMENT/PLAN:       Scribe Attestation  By signing my name below, I, Alma SQUIRES  NORMA Medel, [employer]   attest that this documentation has been prepared under the direction and in the presence of Christopher Meyers MD.

## 2025-03-24 NOTE — PROGRESS NOTES
Mohit Haas is here for his Medicare annual wellness visit.  He is overall been feeling well.  His only complaint is that of nasal congestion cough with slight clear sputum for about 6 weeks now.  He denies any fevers or chills.  He does have a history of springtime allergies.  He otherwise has no complaints.  He continues on his medications noted.  He sees various consultants including Dr. Gaytan for neurology care of his seizure disorder.,  Dr. Weathers for cardiology care, and Dr. Giron for his thrombocytosis.    Patient ID: Waldo Cohen is a 74 y.o. male who presents for Medicare Annual Wellness Visit Subsequent (AWV - since 1st of February has had a dry cough and congestion but controlling with Claritan):    Problem List Items Addressed This Visit    None  Visit Diagnoses       Medicare annual wellness visit, subsequent        Relevant Orders    CBC    Comprehensive Metabolic Panel    Lipid Panel    Overweight with body mass index (BMI) of 28 to 28.9 in adult        Relevant Orders    CBC    Comprehensive Metabolic Panel    Lipid Panel    Former smoker        Prostate cancer screening        Relevant Orders    Prostate Specific Antigen, Screen    Hyperlipidemia, unspecified hyperlipidemia type        Relevant Orders    CBC    Comprehensive Metabolic Panel    Lipid Panel    Essential (primary) hypertension        Relevant Orders    [facility]    Comprehensive Metabolic Panel    Lipid Panel           Past Medical History:   Diagnosis Date    Allergic     Arrhythmia     afib    Body mass index (BMI) 28.0-28.9, adult     BMI 28.0-28.9,adult    Cerebral infarction, unspecified 10/24/2016    Cryptogenic stroke    Gastrojejunal ulcer, unspecified as acute or chronic, without hemorrhage or perforation     Sepsis-related gastrointestinal ulceration    GERD (gastroesophageal reflux disease) 1/1999    Heart disease     Heart valve disease     HL (hearing loss) 8/2024    Overweight     Overweight (BMI 25.0-29.9)    Personal  history of other diseases of the circulatory system     History of coronary atherosclerosis    Seizure disorder (Multi)       Past Surgical History:   Procedure Laterality Date    CARDIAC CATHETERIZATION N/A 2024    Procedure: LAAO (Left Atrial Appendage Occlusion);  Surgeon: Golden Bray MD;  Location: [address] Cardiac Cath Lab;  Service: Cardiovascular;  Laterality: N/A;  Same Day CT 1000    KNEE ARTHROPLASTY      KNEE ARTHROPLASTY Right 2024    MR HEAD ANGIO WO IV CONTRAST  2022    MR HEAD ANGIO WO IV CONTRAST 3/1/2022 [facility]    MR NECK ANGIO WO IV CONTRAST  2022    MR NECK ANGIO WO IV CONTRAST 3/1/2022 [facility]    OTHER SURGICAL HISTORY  2022    Finger amputation    OTHER SURGICAL HISTORY  2022    Colonoscopy      Family History   Problem Relation Name Age of Onset    Heart disease Mother Betzy     Diabetes Mother Betzy     Hypertension Mother Betzy     Stroke Mother Betzy     Dementia Mother Betzy     Heart failure Mother Betzy     Cancer Father Guynn       Social History     Socioeconomic History    Marital status:      Spouse name: Not on file    Number of children: Not on file    Years of education: Not on file    Highest education level: Not on file   Occupational History    Not on file   Tobacco Use    Smoking status: Former     Current packs/day: 0.00     Average packs/day: 0.5 packs/day for 10.0 years (5.0 ttl pk-yrs)     Types: Cigarettes     Start date: 1974     Quit date:      Years since quittin.2    Smokeless tobacco: Never   Vaping Use    Vaping status: Never Used   Substance and Sexual Activity    Alcohol use: Yes     Alcohol/week: 1.0 standard drink of alcohol     Types: 1 Shots of liquor per week    Drug use: Never    Sexual activity: Not Currently     Partners: Female   Other Topics Concern    Not on file   Social History Narrative    Not on file     Social Drivers of Health     Financial Resource  Strain: Patient Declined (9/30/2024)    Overall Financial Resource Strain (CARDIA)     Difficulty of Paying Living Expenses: Patient declined   Food Insecurity: Patient Declined (9/30/2024)    Hunger Vital Sign     Worried About Running Out of Food in the Last Year: Patient declined     Ran Out of Food in the Last Year: Patient declined   Transportation Needs: Patient Declined (9/30/2024)    PRAPARE - Transportation     Lack of Transportation (Medical): Patient declined     Lack of Transportation (Non-Medical): Patient declined   Physical Activity: Not on file   Stress: Not on file   Social Connections: Not on file   Intimate Partner Violence: Not At Risk (9/30/2024)    Humiliation, Afraid, Rape, and Kick questionnaire     Fear of Current or Ex-Partner: No     Emotionally Abused: No     Physically Abused: No     Sexually Abused: No   Housing Stability: Patient Declined (9/30/2024)    Housing Stability Vital Sign     Unable to Pay for Housing in the Last Year: Patient declined     Number of Times Moved in the Last Year: 1     Homeless in the Last Year: Patient declined      Demerol [meperidine]   Current Outpatient Medications   Medication Sig Dispense Refill    amoxicillin (Amoxil) 500 mg capsule Take 4 caps one hour prior to dental procedure (Patient taking differently: if needed. Take 4 caps one hour prior to dental procedure) 4 capsule 1    aspirin 81 mg EC tablet Take 1 tablet (81 mg) by mouth once daily.      atorvastatin (Lipitor) 40 mg tablet Take 1 tablet (40 mg) by mouth once daily. 90 tablet 3    brivaracetam (Briviact) 100 mg tablet tablet Take 1 tablet (100 mg) by mouth 2 times a day. 180 tablet 1    loratadine (Claritin) 10 mg tablet Take 1 tablet (10 mg) by mouth once daily.      multivitamin tablet Take 1 tablet by mouth once daily.      pantoprazole (ProtoNix) 40 mg EC tablet Take 1 tablet (40 mg) by mouth once daily in the morning. Take before meals. Do not crush, chew, or split. 90 tablet 3     potassium citrate CR (Urocit-K-15) 15 mEq ER tablet Take 3 tablets (45 mEq) by mouth see administration instructions. Take 2 tablets in morning and 1 tablet at night to equal 45 meq daily 180 tablet 6     No current facility-administered medications for this visit.       Immunization History   Administered Date(s) Administered    COVID-19, mRNA, LNP-S, PF, 30 mcg/0.3 mL dose 04/28/2021, 05/19/2021, 12/03/2021    Flu vaccine, trivalent, preservative free, HIGH-DOSE, age 65y+ (Fluzone) 10/01/2018, 09/27/2022    Influenza, Unspecified 10/15/2010, 09/01/2015, 10/04/2016, 09/01/2021, 10/25/2023    Influenza, seasonal, injectable 10/01/2021    Moderna SARS-CoV-2 Vaccination 08/05/2022    PPD Test 07/11/2018, 07/20/2020    Pneumococcal Conjugate PCV 7 03/21/2019    Pneumococcal polysaccharide vaccine, 23-valent, age 2 years and older (PNEUMOVAX 23) 10/01/2020, 09/27/2022        Review of Systems   Constitutional: Negative.    HENT:  Positive for congestion.    Eyes: Negative.    Respiratory:  Positive for cough.    Cardiovascular: Negative.    Gastrointestinal: Negative.    Endocrine: Negative.    Genitourinary: Negative.    Musculoskeletal: Negative.    Skin: Negative.    Allergic/Immunologic: Negative.    Hematological: Negative.    Psychiatric/Behavioral: Negative.     All other systems reviewed and are negative.       Vitals:    03/24/25 0826   BP: 116/80   Pulse: 69   Temp: 36.6 °C (97.9 °F)     Vitals:    03/24/25 0826   Weight: 84.4 kg (186 lb)      Physical Exam  Constitutional:       General: He is not in acute distress.     Appearance: Normal appearance.   Neck:      Vascular: No carotid bruit.   Cardiovascular:      Rate and Rhythm: Normal rate and regular rhythm.      Pulses: Normal pulses.      Heart sounds: Normal heart sounds. No murmur heard.     No friction rub. No gallop.   Pulmonary:      Effort: Pulmonary effort is normal. No respiratory distress.      Breath sounds: Normal breath sounds. No wheezing or  rales.   Abdominal:      General: Abdomen is flat. Bowel sounds are normal. There is no distension.      Palpations: Abdomen is soft. There is no mass.      Tenderness: There is no abdominal tenderness. There is no guarding.   Musculoskeletal:      Cervical back: Neck supple.   Neurological:      General: No focal deficit present.      Mental Status: He is alert and oriented to person, place, and time. Mental status is at baseline.          ASSESSMENT/PLAN: Annual wellness visit.  Check CBC CMP lipid profile and PSA.  Colonoscopy up-to-date.  Recommended to complete eye exam.  Recommended Shingrix and Tdap vaccine updates.    Mild cough.  Continue Claritin daily.  Follow-up in 2 to 3 weeks if coughing persists.  At that point we discussed the need for further evaluation.    Seizure disorder evaluated by neurology.  Continue Briviact daily as directed    Status post Watchman device placement 2023.  History of coronary artery disease.  Followed by cardiology.    Hyperlipidemia.  Continue atorvastatin daily.  Check lipid profile and CMP    Thrombocytosis followed by hematology    History of nephrolithiasis stable and followed by urology    Follow-up with gastroenterology regarding history of Sloan's esophagus.  Follow-up in 1 year pending above and call as needed

## 2025-04-01 LAB
ALBUMIN SERPL-MCNC: 4.6 G/DL (ref 3.6–5.1)
ALP SERPL-CCNC: 49 U/L (ref 35–144)
ALT SERPL-CCNC: 14 U/L (ref 9–46)
ANION GAP SERPL CALCULATED.4IONS-SCNC: 6 MMOL/L (CALC) (ref 7–17)
AST SERPL-CCNC: 16 U/L (ref 10–35)
BILIRUB SERPL-MCNC: 0.8 MG/DL (ref 0.2–1.2)
BUN SERPL-MCNC: 20 MG/DL (ref 7–25)
CALCIUM SERPL-MCNC: 9.6 MG/DL (ref 8.6–10.3)
CHLORIDE SERPL-SCNC: 103 MMOL/L (ref 98–110)
CHOLEST SERPL-MCNC: 106 MG/DL
CHOLEST/HDLC SERPL: 3.2 (CALC)
CO2 SERPL-SCNC: 32 MMOL/L (ref 20–32)
CREAT SERPL-MCNC: 1.12 MG/DL (ref 0.7–1.28)
EGFRCR SERPLBLD CKD-EPI 2021: 70 ML/MIN/1.73M2
ERYTHROCYTE [DISTWIDTH] IN BLOOD BY AUTOMATED COUNT: 15.2 % (ref 11–15)
GLUCOSE SERPL-MCNC: 98 MG/DL (ref 65–99)
HCT VFR BLD AUTO: 42.9 % (ref 38.5–50)
HDLC SERPL-MCNC: 33 MG/DL
HGB BLD-MCNC: 13.5 G/DL (ref 13.2–17.1)
LDLC SERPL CALC-MCNC: 56 MG/DL (CALC)
MCH RBC QN AUTO: 28.7 PG (ref 27–33)
MCHC RBC AUTO-ENTMCNC: 31.5 G/DL (ref 32–36)
MCV RBC AUTO: 91.3 FL (ref 80–100)
NONHDLC SERPL-MCNC: 73 MG/DL (CALC)
PLATELET # BLD AUTO: 390 THOUSAND/UL (ref 140–400)
PMV BLD REES-ECKER: 11.1 FL (ref 7.5–12.5)
POTASSIUM SERPL-SCNC: 5.2 MMOL/L (ref 3.5–5.3)
PROT SERPL-MCNC: 6.4 G/DL (ref 6.1–8.1)
PSA SERPL-MCNC: 0.27 NG/ML
RBC # BLD AUTO: 4.7 MILLION/UL (ref 4.2–5.8)
SODIUM SERPL-SCNC: 141 MMOL/L (ref 135–146)
TRIGL SERPL-MCNC: 86 MG/DL
WBC # BLD AUTO: 6.1 THOUSAND/UL (ref 3.8–10.8)

## 2025-04-16 ENCOUNTER — TELEPHONE (OUTPATIENT)
Dept: NEUROLOGY | Facility: CLINIC | Age: 75
End: 2025-04-16
Payer: MEDICARE

## 2025-04-16 NOTE — TELEPHONE ENCOUNTER
Pt has been keeping track of his episodes. Since 3/15/25, he has had 15 episodes. They have been more intense. He would like some advise on how to move forward with what to do next.

## 2025-05-13 ENCOUNTER — APPOINTMENT (OUTPATIENT)
Dept: HEMATOLOGY/ONCOLOGY | Facility: CLINIC | Age: 75
End: 2025-05-13
Payer: MEDICARE

## 2025-05-20 DIAGNOSIS — G40.909 SEIZURE DISORDER (MULTI): ICD-10-CM

## 2025-05-23 ENCOUNTER — LAB (OUTPATIENT)
Dept: LAB | Facility: CLINIC | Age: 75
End: 2025-05-23
Payer: MEDICARE

## 2025-05-23 DIAGNOSIS — D47.3 ESSENTIAL THROMBOCYTHEMIA (MULTI): ICD-10-CM

## 2025-05-23 LAB
ALBUMIN SERPL BCP-MCNC: 4.7 G/DL (ref 3.4–5)
ALP SERPL-CCNC: 55 U/L (ref 33–136)
ALT SERPL W P-5'-P-CCNC: 18 U/L (ref 10–52)
ANION GAP SERPL CALC-SCNC: 11 MMOL/L (ref 10–20)
AST SERPL W P-5'-P-CCNC: 14 U/L (ref 9–39)
BASOPHILS # BLD AUTO: 0.08 X10*3/UL (ref 0–0.1)
BASOPHILS NFR BLD AUTO: 1.2 %
BILIRUB SERPL-MCNC: 1.2 MG/DL (ref 0–1.2)
BUN SERPL-MCNC: 30 MG/DL (ref 6–23)
CALCIUM SERPL-MCNC: 9.6 MG/DL (ref 8.6–10.3)
CHLORIDE SERPL-SCNC: 101 MMOL/L (ref 98–107)
CO2 SERPL-SCNC: 32 MMOL/L (ref 21–32)
CREAT SERPL-MCNC: 1.15 MG/DL (ref 0.5–1.3)
EGFRCR SERPLBLD CKD-EPI 2021: 68 ML/MIN/1.73M*2
EOSINOPHIL # BLD AUTO: 0.07 X10*3/UL (ref 0–0.4)
EOSINOPHIL NFR BLD AUTO: 1 %
ERYTHROCYTE [DISTWIDTH] IN BLOOD BY AUTOMATED COUNT: 15.5 % (ref 11.5–14.5)
GLUCOSE SERPL-MCNC: 82 MG/DL (ref 74–99)
HCT VFR BLD AUTO: 42.7 % (ref 41–52)
HGB BLD-MCNC: 13.6 G/DL (ref 13.5–17.5)
IMM GRANULOCYTES # BLD AUTO: 0.06 X10*3/UL (ref 0–0.5)
IMM GRANULOCYTES NFR BLD AUTO: 0.9 % (ref 0–0.9)
LYMPHOCYTES # BLD AUTO: 0.74 X10*3/UL (ref 0.8–3)
LYMPHOCYTES NFR BLD AUTO: 10.7 %
MCH RBC QN AUTO: 29.1 PG (ref 26–34)
MCHC RBC AUTO-ENTMCNC: 31.9 G/DL (ref 32–36)
MCV RBC AUTO: 91 FL (ref 80–100)
MONOCYTES # BLD AUTO: 0.53 X10*3/UL (ref 0.05–0.8)
MONOCYTES NFR BLD AUTO: 7.6 %
NEUTROPHILS # BLD AUTO: 5.45 X10*3/UL (ref 1.6–5.5)
NEUTROPHILS NFR BLD AUTO: 78.6 %
PLATELET # BLD AUTO: 429 X10*3/UL (ref 150–450)
POTASSIUM SERPL-SCNC: 4.6 MMOL/L (ref 3.5–5.3)
PROT SERPL-MCNC: 6.8 G/DL (ref 6.4–8.2)
RBC # BLD AUTO: 4.67 X10*6/UL (ref 4.5–5.9)
SODIUM SERPL-SCNC: 139 MMOL/L (ref 136–145)
WBC # BLD AUTO: 6.9 X10*3/UL (ref 4.4–11.3)

## 2025-05-23 PROCEDURE — 36415 COLL VENOUS BLD VENIPUNCTURE: CPT

## 2025-05-23 PROCEDURE — 85025 COMPLETE CBC W/AUTO DIFF WBC: CPT

## 2025-05-23 PROCEDURE — 80053 COMPREHEN METABOLIC PANEL: CPT

## 2025-05-23 NOTE — PROGRESS NOTES
Patient ID: Waldo Cohen is a 74 y.o. male.  Diagnosis:  ET  MedOnc: Dr. Giron   Primary Care Provider: Christopher Meyers MD  Referring Provider: Greg Giron MD  [id number] [facility] [address] [place] [id number]  Visit Type: Follow Up    Date of Service: 05/27/25  Location: [facility]     Patient Care Team:  Christopher Meyers MD as PCP - General  Greg Giron MD as PCP - [facility] ACO Attributed Provider  Christopher Meyers MD as PCP - [employer] PCP  Greg Giron MD as Consulting Physician (Hematology and Oncology)    Current Therapy: ASA     ONCOLOGIC HISTORY     No matching staging information was found for the patient.    December 2015: 2 admissions for questionable stroke symptoms; placed on ASA and plavix; persistent thrombocytosis.   1/29/2016: Bone marrow biopsy - Myeloproliferative neoplasm, Essential thrombocythemia. Analysis for JAK2 V617F mutation by PCR is positive. The specimen is negative for  BCR-ABL1 fusion transcripts.     In terms of risk stratification for MPD/ET, he would be considered low risk. High risk would be age >65, prior thrombotic event (arterial or venous), or platelets  > 1 million and would require platelet lowering therapy. For low risk patients, aspirin would be adequate.  Even though neurology was never able to confirm whether he had a TIA or not, it would be fine for him to stay on dual antiplatelet therapy with aspirin and plavix. Now that he has been confirmed to have JAK2+ ET, the TIA event could have been related, however, thrombotic events in ET usually occur when the platelet count approaches or exceeds 1 million.    11/4/2021: panendoscopy -EGD with Sloan's esophagus, colonoscopy only with diverticulosis  February 2022: GI bleed--had panendoscopy done--hemorrhoids found; platelet count increasing no doubt secondary to reactive thrombocytosis secondary to iron deficiency  September 2022: knee replacement surgery --hgb dropped down to 11  postop  2/27/2024: Watchman implant - Continued Plavix and ASA   1/2025: Stopped Plavix, continue ASA     Oncology History    No history exists.      Other Contributing History  TIA (ASA), seizure disorder, mitral valve regurgitation, a fib, watchman    Subjective      INTERVAL HISTORY     Waldo Cohen is a 74 y.o. male who presents today for follow up of ET. Patient of Greg Giron MD currently on ASA only. He had a Watchman's implant in February 2024. In January 2025 he stopped plavix. He is feeling well and without concerns. No issues with fevers, infections, night sweats, bleeding, clotting. No recent illness or hospitalizations.     Review of Systems   Constitutional:  Negative for appetite change, chills, diaphoresis, fatigue, fever and unexpected weight change.   HENT:  Negative.     Respiratory:  Negative for chest tightness and shortness of breath.    Cardiovascular:  Negative for chest pain and leg swelling.   Gastrointestinal:  Negative for blood in stool, diarrhea, nausea and vomiting.   Endocrine: Negative.    Genitourinary: Negative.  Negative for hematuria.    Musculoskeletal: Negative.    Skin: Negative.    Neurological: Negative.    Hematological: Negative.    Psychiatric/Behavioral: Negative.         Objective      /70   Pulse 70   Temp 36.2 °C (97.2 °F)   Resp 17   Wt 80.7 kg (177 lb 14.6 oz)   SpO2 98%   BMI 27.05 kg/m²   BSA: 1.97 meters squared    Wt Readings from Last 5 Encounters:   05/27/25 80.7 kg (177 lb 14.6 oz)   03/24/25 84.4 kg (186 lb)   01/08/25 85 kg (187 lb 4.8 oz)   12/31/24 83.9 kg (185 lb)   11/13/24 84.4 kg (186 lb)     Performance Status:  ECOG Score: 0- Fully active, able to carry on all pre-disease performance w/o restriction.  Karnofsky Score: 90 - Able to carry on normal activity; minor signs or symptoms of disease     PHYSICAL EXAM   Physical Exam  Constitutional:       General: He is not in acute distress.     Appearance: Normal appearance. He is not  toxic-appearing.   HENT:      Head: Normocephalic and atraumatic.   Eyes:      Pupils: Pupils are equal, round, and reactive to light.   Pulmonary:      Effort: Pulmonary effort is normal.   Musculoskeletal:         General: Normal range of motion.      Cervical back: Normal range of motion.   Neurological:      General: No focal deficit present.      Mental Status: He is alert and oriented to person, place, and time.      Motor: No weakness.   Psychiatric:         Mood and Affect: Mood normal.         Behavior: Behavior normal.         Thought Content: Thought content normal.         Judgment: Judgment normal.       Allergies  RX Allergies[1]   Medications  Current Outpatient Medications   Medication Instructions    amoxicillin (Amoxil) 500 mg capsule Take 4 caps one hour prior to dental procedure    aspirin 81 mg, Daily    atorvastatin (LIPITOR) 40 mg, oral, Daily    brivaracetam (BRIVIACT) 100 mg, oral, 2 times daily    loratadine (CLARITIN) 10 mg, Daily    multivitamin tablet 1 tablet, Daily    pantoprazole (PROTONIX) 40 mg, oral, Daily before breakfast, Do not crush, chew, or split.    potassium citrate CR (Urocit-K-15) 15 mEq ER tablet 45 mEq, oral, See admin instructions, Take 2 tablets in morning and 1 tablet at night to equal 45 meq daily        Diagnostic Results   RESULTS     No results found. However, due to the size of the patient record, not all encounters were searched. Please check Results Review for a complete set of results.   Latest Reference Range & Units 05/23/25 10:13   GLUCOSE 74 - 99 mg/dL 82   SODIUM 136 - 145 mmol/L 139   POTASSIUM 3.5 - 5.3 mmol/L 4.6   CHLORIDE 98 - 107 mmol/L 101   Bicarbonate 21 - 32 mmol/L 32   Anion Gap 10 - 20 mmol/L 11   Blood Urea Nitrogen 6 - 23 mg/dL 30 (H)   Creatinine 0.50 - 1.30 mg/dL 1.15   EGFR >60 mL/min/1.73m*2 68   Calcium 8.6 - 10.3 mg/dL 9.6   Albumin 3.4 - 5.0 g/dL 4.7   Alkaline Phosphatase 33 - 136 U/L 55   ALT 10 - 52 U/L 18   AST 9 - 39 U/L 14  "  Bilirubin Total 0.0 - 1.2 mg/dL 1.2   (H): Data is abnormally high   Latest Reference Range & Units 05/23/25 10:13   WBC 4.4 - 11.3 x10*3/uL 6.9   RBC 4.50 - 5.90 x10*6/uL 4.67   HEMOGLOBIN 13.5 - 17.5 g/dL 13.6   HEMATOCRIT 41.0 - 52.0 % 42.7   MCV 80 - 100 fL 91   MCH 26.0 - 34.0 pg 29.1   MCHC 32.0 - 36.0 g/dL 31.9 (L)   RED CELL DISTRIBUTION WIDTH 11.5 - 14.5 % 15.5 (H)   Platelets 150 - 450 x10*3/uL 429   Neutrophils % 40.0 - 80.0 % 78.6   Immature Granulocytes %, Automated 0.0 - 0.9 % 0.9   Lymphocytes % 13.0 - 44.0 % 10.7   Monocytes % 2.0 - 10.0 % 7.6   Eosinophils % 0.0 - 6.0 % 1.0   Basophils % 0.0 - 2.0 % 1.2   Neutrophils Absolute 1.60 - 5.50 x10*3/uL 5.45   Immature Granulocytes Absolute, Automated 0.00 - 0.50 x10*3/uL 0.06   Lymphocytes Absolute 0.80 - 3.00 x10*3/uL 0.74 (L)   Monocytes Absolute 0.05 - 0.80 x10*3/uL 0.53   Eosinophils Absolute 0.00 - 0.40 x10*3/uL 0.07   Basophils Absolute 0.00 - 0.10 x10*3/uL 0.08   (L): Data is abnormally low  (H): Data is abnormally high    Assessment/Plan   ASSESSMENT     Waldo Cohen is a 74 y.o. male with JAK2+ ET diagnosed 1/29/2016, no history of thrombosis and his \"TIA\" was considered questionable. He presents in follow up today on ASA 81 mg daily. Since his Watchman in 2/27/2024 he is no longer on Coumadin. In January 2025, he stopped his Plavix as a group consensus with hematology and cardiology, his platelet count has been stable.     Platelet count on 5/23/2025 was 429.  He is asymptomatic, without new concerns. No recent illnesses or hospitalizations.   No indication to start treatment with Hydrea at this time.   We discussed continued cautious observation with CBC, CMP and follow up every 6 months.  We usually initiate platelet lowering therapy (with either anagrelide or hydroxyurea) when the platelet count nears the 1 million krystyna.    PLAN     # Essential thrombocythemia  - Continue ASA 81 mg PO daily  - CBC and CMP in 6 months    Follow up with me " in 6 months.     Waldo was seen today for follow-up.  Diagnoses and all orders for this visit:  Essential thrombocythemia (Multi)  -     CBC and Auto Differential; Future  -     Comprehensive Metabolic Panel; Future  -     Clinic Appointment Request Follow Up; MESERET SARMIENTO; [facility] HEMATOLOGY ONC; Future  -     Clinic Appointment Request Follow Up; MESERET SARMIENTO; [facility] MEDONC1    [No matching plan found]    Patient verbalizes understanding of above plan. Time provided for patient's questions. All questions answered to patient's satisfaction in office. Patient instructed to reach out for any new concerning issues at [phone].    Meseret Sarmiento MSN, APRN, A-GNP-C, AOCNP  [facility]  Division of Hematology & Medical Oncology   [address] [place] [address]  Phone: [phone]  Available via [employer] Secure Chat  [email or web address]       [1]   Allergies  Allergen Reactions    Demerol [Meperidine] GI Upset

## 2025-05-27 ENCOUNTER — OFFICE VISIT (OUTPATIENT)
Dept: HEMATOLOGY/ONCOLOGY | Facility: CLINIC | Age: 75
End: 2025-05-27
Payer: MEDICARE

## 2025-05-27 VITALS
TEMPERATURE: 97.2 F | OXYGEN SATURATION: 98 % | HEART RATE: 70 BPM | SYSTOLIC BLOOD PRESSURE: 122 MMHG | BODY MASS INDEX: 27.05 KG/M2 | RESPIRATION RATE: 17 BRPM | WEIGHT: 177.91 LBS | DIASTOLIC BLOOD PRESSURE: 70 MMHG

## 2025-05-27 DIAGNOSIS — D47.3 ESSENTIAL THROMBOCYTHEMIA (MULTI): ICD-10-CM

## 2025-05-27 PROCEDURE — 99214 OFFICE O/P EST MOD 30 MIN: CPT

## 2025-05-27 PROCEDURE — 1126F AMNT PAIN NOTED NONE PRSNT: CPT

## 2025-05-27 PROCEDURE — 1160F RVW MEDS BY RX/DR IN RCRD: CPT

## 2025-05-27 PROCEDURE — 1159F MED LIST DOCD IN RCRD: CPT

## 2025-05-27 ASSESSMENT — ENCOUNTER SYMPTOMS
MUSCULOSKELETAL NEGATIVE: 1
NEUROLOGICAL NEGATIVE: 1
PSYCHIATRIC NEGATIVE: 1
LEG SWELLING: 0
BLOOD IN STOOL: 0
SHORTNESS OF BREATH: 0
HEMATURIA: 0
UNEXPECTED WEIGHT CHANGE: 0
ENDOCRINE NEGATIVE: 1
VOMITING: 0
FEVER: 0
FATIGUE: 0
APPETITE CHANGE: 0
DIAPHORESIS: 0
DIARRHEA: 0
CHEST TIGHTNESS: 0
NAUSEA: 0
HEMATOLOGIC/LYMPHATIC NEGATIVE: 1
CHILLS: 0

## 2025-05-27 ASSESSMENT — PAIN SCALES - GENERAL: PAINLEVEL_OUTOF10: 0-NO PAIN

## 2025-06-18 ENCOUNTER — TELEPHONE (OUTPATIENT)
Dept: PRIMARY CARE | Facility: CLINIC | Age: 75
End: 2025-06-18
Payer: MEDICARE

## 2025-06-18 DIAGNOSIS — I35.0 NONRHEUMATIC AORTIC VALVE STENOSIS: ICD-10-CM

## 2025-06-18 RX ORDER — AMOXICILLIN 500 MG/1
CAPSULE ORAL
Qty: 4 CAPSULE | Refills: 1 | Status: SHIPPED
Start: 2025-06-18 | End: 2025-06-18 | Stop reason: SDUPTHER

## 2025-06-18 RX ORDER — AMOXICILLIN 500 MG/1
CAPSULE ORAL
Qty: 4 CAPSULE | Refills: 1 | Status: SHIPPED | OUTPATIENT
Start: 2025-06-18

## 2025-06-18 NOTE — TELEPHONE ENCOUNTER
Paint is out medication and needs it for a dental appointment tomorrow     Rx Refill Request Telephone Encounter    Name:  Waldo Cohen  :  [id number]  Medication Name:      amoxicillin (Amoxil) 500 mg capsule    Specific Pharmacy location:      [facility]   [address] [id number]  Phone: [phone]  Fax: [phone]     Date of last appointment:      3/24/25    Date of next appointment:  3/25/26    Best number to reach patient:  [phone]

## 2025-07-29 ENCOUNTER — OFFICE VISIT (OUTPATIENT)
Dept: PRIMARY CARE | Facility: CLINIC | Age: 75
End: 2025-07-29
Payer: MEDICARE

## 2025-07-29 ENCOUNTER — TELEPHONE (OUTPATIENT)
Dept: PRIMARY CARE | Facility: CLINIC | Age: 75
End: 2025-07-29

## 2025-07-29 VITALS
TEMPERATURE: 97.4 F | HEART RATE: 74 BPM | BODY MASS INDEX: 27.71 KG/M2 | DIASTOLIC BLOOD PRESSURE: 80 MMHG | SYSTOLIC BLOOD PRESSURE: 126 MMHG | HEIGHT: 68 IN | WEIGHT: 182.8 LBS

## 2025-07-29 DIAGNOSIS — Z95.818 PRESENCE OF WATCHMAN LEFT ATRIAL APPENDAGE CLOSURE DEVICE: ICD-10-CM

## 2025-07-29 DIAGNOSIS — G45.9 TIA (TRANSIENT ISCHEMIC ATTACK): ICD-10-CM

## 2025-07-29 DIAGNOSIS — G40.909 SEIZURE DISORDER (MULTI): ICD-10-CM

## 2025-07-29 DIAGNOSIS — J30.9 ALLERGIC RHINITIS, UNSPECIFIED SEASONALITY, UNSPECIFIED TRIGGER: ICD-10-CM

## 2025-07-29 DIAGNOSIS — D47.3 ESSENTIAL THROMBOCYTOSIS: ICD-10-CM

## 2025-07-29 DIAGNOSIS — I48.0 PAROXYSMAL ATRIAL FIBRILLATION (MULTI): ICD-10-CM

## 2025-07-29 DIAGNOSIS — H66.91 RIGHT OTITIS MEDIA, UNSPECIFIED OTITIS MEDIA TYPE: Primary | ICD-10-CM

## 2025-07-29 PROCEDURE — 3008F BODY MASS INDEX DOCD: CPT | Performed by: INTERNAL MEDICINE

## 2025-07-29 PROCEDURE — 99214 OFFICE O/P EST MOD 30 MIN: CPT | Performed by: INTERNAL MEDICINE

## 2025-07-29 PROCEDURE — 1159F MED LIST DOCD IN RCRD: CPT | Performed by: INTERNAL MEDICINE

## 2025-07-29 RX ORDER — AMOXICILLIN AND CLAVULANATE POTASSIUM 500; 125 MG/1; MG/1
500 TABLET, FILM COATED ORAL 3 TIMES DAILY
Qty: 21 TABLET | Refills: 0 | Status: SHIPPED | OUTPATIENT
Start: 2025-07-29 | End: 2025-08-05

## 2025-07-29 RX ORDER — AMOXICILLIN AND CLAVULANATE POTASSIUM 500; 125 MG/1; MG/1
500 TABLET, FILM COATED ORAL 3 TIMES DAILY
Qty: 21 TABLET | Refills: 0 | Status: SHIPPED | OUTPATIENT
Start: 2025-07-29 | End: 2025-07-29 | Stop reason: SDUPTHER

## 2025-07-29 NOTE — TELEPHONE ENCOUNTER
Patient was in today.    Larisa Meehan is out of    amoxicillin-clavulanate (Augmentin) 500-125 mg tablet       Please send to   [facility]/pharmacy #[id number] - [place], [place] - [address] [facility] Phone: [phone]   Fax: [phone]

## 2025-07-29 NOTE — PROGRESS NOTES
Assessment/Plan   Problem List Items Addressed This Visit       Essential thrombocytosis    Paroxysmal atrial fibrillation (Multi)    Presence of Watchman left atrial appendage closure device    TIA (transient ischemic attack)    Seizure disorder (Multi)    Right otitis media - Primary    Relevant Medications    amoxicillin-clavulanate (Augmentin) 500-125 mg tablet    Allergic rhinitis     In addition to the antibiotic that was being given for right otitis media he has been taking Claritin which she should continue for allergic rhinitis but advised to add Flonase he has at home to use it  He has no further TIA  Thrombocythemia has been under control  He has Watchman procedure and does get occasional palpitation but right now he is in normal sinus rhythm  He has a history of seizure doing okay  Antibiotic resent to a different pharmacy after the visit as per patient request  Subjective   Patient ID: Waldo Cohen is a 74 y.o. male who presents for Earache (Right ear pain and pressure that started  evening 25.).    Surgical History[1]   Family History[2]   Social History     Socioeconomic History    Marital status:      Spouse name: Not on file    Number of children: Not on file    Years of education: Not on file    Highest education level: Not on file   Occupational History    Not on file   Tobacco Use    Smoking status: Former     Current packs/day: 0.00     Average packs/day: 0.5 packs/day for 10.0 years (5.0 ttl pk-yrs)     Types: Cigarettes     Start date: 1974     Quit date:      Years since quittin.6    Smokeless tobacco: Never   Vaping Use    Vaping status: Never Used   Substance and Sexual Activity    Alcohol use: Yes     Alcohol/week: 1.0 standard drink of alcohol     Types: 1 Shots of liquor per week    Drug use: Never    Sexual activity: Not Currently     Partners: Female   Other Topics Concern    Not on file   Social History Narrative    Not on file     Social Drivers of  "Health     Financial Resource Strain: Patient Declined (9/30/2024)    Overall Financial Resource Strain (CARDIA)     Difficulty of Paying Living Expenses: Patient declined   Food Insecurity: Patient Declined (9/30/2024)    Hunger Vital Sign     Worried About Running Out of Food in the Last Year: Patient declined     Ran Out of Food in the Last Year: Patient declined   Transportation Needs: Patient Declined (9/30/2024)    PRAPARE - Transportation     Lack of Transportation (Medical): Patient declined     Lack of Transportation (Non-Medical): Patient declined   Physical Activity: Not on file   Stress: Not on file   Social Connections: Not on file   Intimate Partner Violence: Not At Risk (9/30/2024)    Humiliation, Afraid, Rape, and Kick questionnaire     Fear of Current or Ex-Partner: No     Emotionally Abused: No     Physically Abused: No     Sexually Abused: No   Housing Stability: Patient Declined (9/30/2024)    Housing Stability Vital Sign     Unable to Pay for Housing in the Last Year: Patient declined     Number of Times Moved in the Last Year: 1     Homeless in the Last Year: Patient declined      Demerol [meperidine]   Current Rx[3]   Vitals:    07/29/25 0940   BP: 126/80   BP Location: Left arm   Patient Position: Sitting   Pulse: 74   Temp: 36.3 °C (97.4 °F)   Weight: 82.9 kg (182 lb 12.8 oz)   Height: 1.727 m (5' 8\")      Problem List Items Addressed This Visit       Essential thrombocytosis    Paroxysmal atrial fibrillation (Multi)    Presence of Watchman left atrial appendage closure device    TIA (transient ischemic attack)    Seizure disorder (Multi)    Right otitis media - Primary    Relevant Medications    amoxicillin-clavulanate (Augmentin) 500-125 mg tablet    Allergic rhinitis      No orders of the defined types were placed in this encounter.       HPI  This is a 74-year-old gentleman with a history of paroxysmal atrial fibrillation Watchman procedure mitral valve prolapse GERD, and essential " "thrombocythemia with myeloproliferative disorder currently being followed by hematologist presented today with right ear discomfort for few days and not improving  Is only in the right ear no problem in the left ear  He does suffer from from hayfever and nasal congestion and uses loratadine on a regular basis and has nasal spray as well at home  .  No fever no shortness of breath no lump or bump or any problem otherwise  ROS essentially negative  Past medical history reviewed  Social and family history reviewed  Allergies and medications reviewed  Recent labs reviewed  Vital signs reviewed    PHYSICAL EXAM  Evidence of inflammation of the eardrum in the right ear  Rest of the examination was normal    No results found for: \"PR1\", \"BMPR1A\", \"CMPLAS\", \"RP3AQQSC\", \"KPSAT\"   Lab Results   Component Value Date    CHOL 106 03/31/2025    LDLCALC 56 03/31/2025    CHHDL 3.2 03/31/2025     Lab Results   Component Value Date    TSH 0.94 03/01/2022    HGBA1C 5.4 09/16/2024    PSA 0.27 03/31/2025              === 11/29/24 ===    CT ABDOMEN PELVIS WO IV CONTRAST    - Impression -  1. Interval resolution of previously noted ureterolith in the  left-sided ureterovesicular junction  2. Redemonstration of splenomegaly.  3. Stable appearance of simple cysts in the bilateral kidneys and  liver.  4. Additional findings as above    MACRO:  None.    Signed by: Clarisa Melendrez 12/2/2024 7:34 AM  Dictation workstation:   [id number]    Results for orders placed during the hospital encounter of 02/27/24    Transthoracic Echo (TTE) Limited    [facility], [address] [place] [address]  Tel [phone] and Fax [phone]    TRANSTHORACIC ECHOCARDIOGRAM REPORT      Patient Name:      MAGO Osorio Physician:    [id number] Zach Howard MD  Study Date:        2/27/2024            Ordering Provider:    [id number] JUDIT NORIEGA  MRN/PID:           [id number]             Fellow:  Accession#:       "  [id number]         Nurse:  Date of Birth/Age: 1950 / 73      Sonographer:          Ronda Morales RDCS  years  Gender:            M                    Additional Staff:  Height:            175.00 cm            Admit Date:           2/27/2024  Weight:            84.00 kg             Admission Status:     Inpatient -  Routine  BSA / BMI:         2.00 m2 / 27.43      Encounter#:           [id number]  kg/m2  Department Location:  [facility]  Cath Lab    Study Type:    TRANSTHORACIC ECHO (TTE) LIMITED  Diagnosis/ICD: Other persistent AFib-I48.19  Indication:    Post Watchman  CPT Code:      Echo Limited-67043    Patient History:  Valve Disorders:   Aortic Stenosis.  Pertinent History: CAD, A-Fib and Dyslipidemia.    Study Detail: The following Echo studies were performed: 2D and M-Mode. Unable  to obtain suprasternal notch view.      PHYSICIAN INTERPRETATION:  Left Ventricle: The left ventricular systolic function is normal, with an estimated ejection fraction of 60-65%. There are no regional wall motion abnormalities. The left ventricular cavity size is normal. Abnormal (paradoxical) septal motion consistent with post-operative status. Left ventricular diastolic filling was not assessed.  Left Atrium: The left atrium is normal in size.  Right Ventricle: The right ventricle is normal in size. There is normal right ventricular global systolic function.  Right Atrium: The right atrium is normal in size.  Aortic Valve: The aortic valve was not well visualized. There is mild aortic valve thickening. Aortic valve regurgitation was not assessed. Suspect some aortic leaflet restriction.  Mitral Valve: The mitral valve is mildly thickened. There is moderate mitral annular calcification. Mitral valve regurgitation was not assessed. The patient is s/p 36 mm mitral annular ring repair in 7/2023.  Tricuspid Valve: The tricuspid valve was not well visualized. Tricuspid regurgitation was not assessed. The right ventricular  systolic pressure is unable to be estimated.  Pulmonic Valve: The pulmonic valve is not well visualized. The pulmonic valve regurgitation was not well visualized.  Pericardium: There is a trivial pericardial effusion.  Aorta: The aortic root was not well visualized. The ascending aorta was not well visualized.  Systemic Veins: The inferior vena cava was not well visualized.  In comparison to the previous echocardiogram(s): Compared with study from 2/27/2024,.      CONCLUSIONS:  1. Left ventricular systolic function is normal with a 60-65% estimated ejection fraction.  2. Abnormal septal motion consistent with post-operative status.  3. There is moderate mitral annular calcification.    QUANTITATIVE DATA SUMMARY:    [id number] Zach Howard MD  Electronically signed on 2/27/2024 at 3:19:29 PM        ** Final **                          [1]   Past Surgical History:  Procedure Laterality Date    CARDIAC CATHETERIZATION N/A 02/27/2024    Procedure: LAAO (Left Atrial Appendage Occlusion);  Surgeon: Golden Bray MD;  Location: [address] Cardiac Cath Lab;  Service: Cardiovascular;  Laterality: N/A;  Same Day CT 1000    KNEE ARTHROPLASTY      KNEE ARTHROPLASTY Right 09/30/2024    MR HEAD ANGIO WO IV CONTRAST  03/01/2022    MR HEAD ANGIO WO IV CONTRAST 3/1/2022 [facility]    MR NECK ANGIO WO IV CONTRAST  03/01/2022    MR NECK ANGIO WO IV CONTRAST 3/1/2022 [facility]    OTHER SURGICAL HISTORY  02/25/2022    Finger amputation    OTHER SURGICAL HISTORY  03/14/2022    Colonoscopy   [2]   Family History  Problem Relation Name Age of Onset    Heart disease Mother Betzy     Diabetes Mother Betzy     Hypertension Mother Betzy     Stroke Mother Betzy     Dementia Mother Betzy     Heart failure Mother Betzy     Cancer Father Rosanna    [3]   Current Outpatient Medications   Medication Sig Dispense Refill    amoxicillin (Amoxil) 500 mg capsule Take 4 caps one hour prior to dental procedure 4 capsule 1     aspirin 81 mg EC tablet Take 1 tablet (81 mg) by mouth once daily.      atorvastatin (Lipitor) 40 mg tablet Take 1 tablet (40 mg) by mouth once daily. 90 tablet 3    brivaracetam (Briviact) 100 mg tablet tablet Take 1 tablet (100 mg) by mouth 2 times a day. 180 tablet 1    loratadine (Claritin) 10 mg tablet Take 1 tablet (10 mg) by mouth once daily.      multivitamin tablet Take 1 tablet by mouth once daily.      pantoprazole (ProtoNix) 40 mg EC tablet Take 1 tablet (40 mg) by mouth once daily in the morning. Take before meals. Do not crush, chew, or split. 90 tablet 3    potassium citrate CR (Urocit-K-15) 15 mEq ER tablet Take 3 tablets (45 mEq) by mouth see administration instructions. Take 2 tablets in morning and 1 tablet at night to equal 45 meq daily 180 tablet 6    amoxicillin-clavulanate (Augmentin) 500-125 mg tablet Take 1 tablet (500 mg of amoxicillin) by mouth 3 times a day for 7 days. 21 tablet 0     No current facility-administered medications for this visit.

## 2025-07-31 ENCOUNTER — APPOINTMENT (OUTPATIENT)
Dept: PRIMARY CARE | Facility: CLINIC | Age: 75
End: 2025-07-31
Payer: MEDICARE

## 2025-07-31 ENCOUNTER — OFFICE VISIT (OUTPATIENT)
Dept: PRIMARY CARE | Facility: CLINIC | Age: 75
End: 2025-07-31
Payer: MEDICARE

## 2025-07-31 VITALS
DIASTOLIC BLOOD PRESSURE: 61 MMHG | BODY MASS INDEX: 27.4 KG/M2 | WEIGHT: 180.8 LBS | SYSTOLIC BLOOD PRESSURE: 118 MMHG | HEART RATE: 76 BPM | TEMPERATURE: 97 F | HEIGHT: 68 IN

## 2025-07-31 DIAGNOSIS — B02.30 HERPES ZOSTER WITH OPHTHALMIC COMPLICATION, UNSPECIFIED HERPES ZOSTER EYE DISEASE: ICD-10-CM

## 2025-07-31 DIAGNOSIS — E66.3 OVERWEIGHT WITH BODY MASS INDEX (BMI) OF 27 TO 27.9 IN ADULT: ICD-10-CM

## 2025-07-31 DIAGNOSIS — Z87.891 FORMER SMOKER: ICD-10-CM

## 2025-07-31 PROCEDURE — 99213 OFFICE O/P EST LOW 20 MIN: CPT | Performed by: FAMILY MEDICINE

## 2025-07-31 PROCEDURE — 1159F MED LIST DOCD IN RCRD: CPT | Performed by: FAMILY MEDICINE

## 2025-07-31 PROCEDURE — 1160F RVW MEDS BY RX/DR IN RCRD: CPT | Performed by: FAMILY MEDICINE

## 2025-07-31 PROCEDURE — 3008F BODY MASS INDEX DOCD: CPT | Performed by: FAMILY MEDICINE

## 2025-07-31 RX ORDER — VALACYCLOVIR HYDROCHLORIDE 1 G/1
1000 TABLET, FILM COATED ORAL 3 TIMES DAILY
Qty: 21 TABLET | Refills: 0 | Status: SHIPPED | OUTPATIENT
Start: 2025-07-31 | End: 2025-08-07

## 2025-07-31 ASSESSMENT — ENCOUNTER SYMPTOMS
MUSCULOSKELETAL NEGATIVE: 1
CONSTITUTIONAL NEGATIVE: 1
ALLERGIC/IMMUNOLOGIC NEGATIVE: 1
EYES NEGATIVE: 1
RESPIRATORY NEGATIVE: 1
PSYCHIATRIC NEGATIVE: 1
CARDIOVASCULAR NEGATIVE: 1
ENDOCRINE NEGATIVE: 1
HEMATOLOGIC/LYMPHATIC NEGATIVE: 1
GASTROINTESTINAL NEGATIVE: 1

## 2025-07-31 NOTE — PROGRESS NOTES
Mohit Haas is here with a rash on the right side of his face that began about 1 week ago.  He has some discomfort with this as well in the distribution of the rash.  He does have some slight tingling in his cheek area as well but most of the discomfort and rash are on the right forehead and right temporal region.  He also notices slight scratchy like sensation in his right eye although his vision is normal.  He has never had shingles nor did he ever received the shingles vaccination.  He has had no fevers or chills.  He continues on his meds noted.    Patient ID: Waldo Cohen is a 74 y.o. male who presents for Sick Visit (possible shingles):    Problem List Items Addressed This Visit    None     Medical History[1]   Surgical History[2]   Family History[3]   Social History     Socioeconomic History    Marital status:      Spouse name: Not on file    Number of children: Not on file    Years of education: Not on file    Highest education level: Not on file   Occupational History    Not on file   Tobacco Use    Smoking status: Former     Current packs/day: 0.00     Average packs/day: 0.5 packs/day for 10.0 years (5.0 ttl pk-yrs)     Types: Cigarettes     Start date: 1974     Quit date:      Years since quittin.6    Smokeless tobacco: Never   Vaping Use    Vaping status: Never Used   Substance and Sexual Activity    Alcohol use: Yes     Alcohol/week: 1.0 standard drink of alcohol     Types: 1 Shots of liquor per week    Drug use: Never    Sexual activity: Not Currently     Partners: Female   Other Topics Concern    Not on file   Social History Narrative    Not on file     Social Drivers of Health     Financial Resource Strain: Patient Declined (2024)    Overall Financial Resource Strain (CARDIA)     Difficulty of Paying Living Expenses: Patient declined   Food Insecurity: Patient Declined (2024)    Hunger Vital Sign     Worried About Running Out of Food in the Last Year: Patient  declined     Ran Out of Food in the Last Year: Patient declined   Transportation Needs: Patient Declined (9/30/2024)    PRAPARE - Transportation     Lack of Transportation (Medical): Patient declined     Lack of Transportation (Non-Medical): Patient declined   Physical Activity: Not on file   Stress: Not on file   Social Connections: Not on file   Intimate Partner Violence: Not At Risk (9/30/2024)    Humiliation, Afraid, Rape, and Kick questionnaire     Fear of Current or Ex-Partner: No     Emotionally Abused: No     Physically Abused: No     Sexually Abused: No   Housing Stability: Patient Declined (9/30/2024)    Housing Stability Vital Sign     Unable to Pay for Housing in the Last Year: Patient declined     Number of Times Moved in the Last Year: 1     Homeless in the Last Year: Patient declined      Demerol [meperidine]   Current Medications[4]    Immunization History   Administered Date(s) Administered    COVID-19, mRNA, LNP-S, PF, 30 mcg/0.3 mL dose 04/28/2021, 05/19/2021, 12/03/2021    Flu vaccine, trivalent, preservative free, HIGH-DOSE, age 65y+ (Fluzone) 10/01/2018, 09/27/2022    Influenza, Unspecified 10/15/2010, 09/01/2015, 10/04/2016, 09/01/2021, 10/25/2023    Influenza, seasonal, injectable 10/01/2021    Moderna SARS-CoV-2 Vaccination 08/05/2022    PPD Test 07/11/2018, 07/20/2020    Pneumococcal Conjugate PCV 7 03/21/2019    Pneumococcal polysaccharide vaccine, 23-valent, age 2 years and older (PNEUMOVAX 23) 10/01/2020, 09/27/2022        Review of Systems   Constitutional: Negative.    HENT: Negative.     Eyes: Negative.    Respiratory: Negative.     Cardiovascular: Negative.    Gastrointestinal: Negative.    Endocrine: Negative.    Genitourinary: Negative.    Musculoskeletal: Negative.    Skin:  Positive for rash.   Allergic/Immunologic: Negative.    Hematological: Negative.    Psychiatric/Behavioral: Negative.     All other systems reviewed and are negative.       Vitals:    07/31/25 1313   BP:  118/61   Pulse: 76   Temp: 36.1 °C (97 °F)     Vitals:    07/31/25 1313   Weight: 82 kg (180 lb 12.8 oz)      Physical Exam  Constitutional:       General: He is not in acute distress.     Appearance: Normal appearance.     Neurological:      Mental Status: He is alert.     Right forehead region-there is splotchy erythema on the right anterior forehead extending to the right temporal region.  There are some flat vesicular lesions on the mid lower forehead.  There are no lesions on the right cheek or remainder of the face.  Eyes-there is no significant erythema or drainage in either eye.  There is no rash on the eyelids.    ASSESSMENT/PLAN: Herpes zoster right facial region.  Begin Valtrex 1 g 3 times daily for 1 week.  May use Tylenol as needed.  Patient is to call his optometrist or ophthalmologist as soon as possible to arrange a eye examination this week.  Call if discomfort worsens at which point we will need to consider beginning gabapentin.    Continue other meds as noted  Follow-up in 2 weeks and call as needed       Scribe Attestation  By signing my name below, I, Alma Medel LPN, Scribe   attest that this documentation has been prepared under the direction and in the presence of Christopher Meyers MD.         [1]   Past Medical History:  Diagnosis Date    Allergic     Arrhythmia     afib    Body mass index (BMI) 28.0-28.9, adult     BMI 28.0-28.9,adult    Cerebral infarction, unspecified 10/24/2016    Cryptogenic stroke    Gastrojejunal ulcer, unspecified as acute or chronic, without hemorrhage or perforation     Sepsis-related gastrointestinal ulceration    GERD (gastroesophageal reflux disease)     Heart disease     Heart valve disease     HL (hearing loss)     Overweight     Overweight (BMI 25.0-29.9)    Personal history of other diseases of the circulatory system     History of coronary atherosclerosis    Seizure disorder (Multi)    [2]   Past Surgical History:  Procedure Laterality Date     CARDIAC CATHETERIZATION N/A 02/27/2024    Procedure: LAAO (Left Atrial Appendage Occlusion);  Surgeon: Golden Bray MD;  Location: [address] [facility];  Service: Cardiovascular;  Laterality: N/A;  Same Day CT 1000    KNEE ARTHROPLASTY      KNEE ARTHROPLASTY Right 09/30/2024    MR HEAD ANGIO WO IV CONTRAST  03/01/2022    MR HEAD ANGIO WO IV CONTRAST 3/1/2022 [facility]    MR NECK ANGIO WO IV CONTRAST  03/01/2022    MR NECK ANGIO WO IV CONTRAST 3/1/2022 [facility]    OTHER SURGICAL HISTORY  02/25/2022    Finger amputation    OTHER SURGICAL HISTORY  03/14/2022    Colonoscopy   [3]   Family History  Problem Relation Name Age of Onset    Heart disease Mother Betzy     Diabetes Mother Betzy     Hypertension Mother Betzy     Stroke Mother Betzy     Dementia Mother Betzy     Heart failure Mother Betzy     Cancer Father Rosanna 30 - 39   [4]   Current Outpatient Medications   Medication Sig Dispense Refill    amoxicillin (Amoxil) 500 mg capsule Take 4 caps one hour prior to dental procedure 4 capsule 1    amoxicillin-clavulanate (Augmentin) 500-125 mg tablet Take 1 tablet (500 mg of amoxicillin) by mouth 3 times a day for 7 days. 21 tablet 0    aspirin 81 mg EC tablet Take 1 tablet (81 mg) by mouth once daily.      atorvastatin (Lipitor) 40 mg tablet Take 1 tablet (40 mg) by mouth once daily. 90 tablet 3    brivaracetam (Briviact) 100 mg tablet tablet Take 1 tablet (100 mg) by mouth 2 times a day. 180 tablet 1    loratadine (Claritin) 10 mg tablet Take 1 tablet (10 mg) by mouth once daily.      multivitamin tablet Take 1 tablet by mouth once daily.      pantoprazole (ProtoNix) 40 mg EC tablet Take 1 tablet (40 mg) by mouth once daily in the morning. Take before meals. Do not crush, chew, or split. 90 tablet 3    potassium citrate CR (Urocit-K-15) 15 mEq ER tablet Take 3 tablets (45 mEq) by mouth see administration instructions. Take 2 tablets in morning and 1 tablet at night to equal 45  meq daily 180 tablet 6     No current facility-administered medications for this visit.

## 2025-08-04 ENCOUNTER — TELEPHONE (OUTPATIENT)
Dept: PRIMARY CARE | Facility: CLINIC | Age: 75
End: 2025-08-04
Payer: MEDICARE

## 2025-08-04 NOTE — TELEPHONE ENCOUNTER
Pts wife lvm wanting to know how long pt will be contagious with shingles and when would be an ideal time to travel to see grand child.

## 2025-08-14 ENCOUNTER — APPOINTMENT (OUTPATIENT)
Dept: PRIMARY CARE | Facility: CLINIC | Age: 75
End: 2025-08-14
Payer: MEDICARE

## 2025-08-14 VITALS
WEIGHT: 181 LBS | HEIGHT: 68 IN | HEART RATE: 73 BPM | TEMPERATURE: 97.4 F | BODY MASS INDEX: 27.43 KG/M2 | SYSTOLIC BLOOD PRESSURE: 112 MMHG | DIASTOLIC BLOOD PRESSURE: 76 MMHG

## 2025-08-14 DIAGNOSIS — B02.30 HERPES ZOSTER WITH OPHTHALMIC COMPLICATION, UNSPECIFIED HERPES ZOSTER EYE DISEASE: ICD-10-CM

## 2025-08-14 DIAGNOSIS — Z87.891 FORMER SMOKER: ICD-10-CM

## 2025-08-14 DIAGNOSIS — E66.3 OVERWEIGHT WITH BODY MASS INDEX (BMI) OF 27 TO 27.9 IN ADULT: ICD-10-CM

## 2025-08-14 PROCEDURE — 1159F MED LIST DOCD IN RCRD: CPT | Performed by: FAMILY MEDICINE

## 2025-08-14 PROCEDURE — 99213 OFFICE O/P EST LOW 20 MIN: CPT | Performed by: FAMILY MEDICINE

## 2025-08-14 PROCEDURE — 1160F RVW MEDS BY RX/DR IN RCRD: CPT | Performed by: FAMILY MEDICINE

## 2025-08-14 PROCEDURE — 3008F BODY MASS INDEX DOCD: CPT | Performed by: FAMILY MEDICINE

## 2025-08-14 ASSESSMENT — ENCOUNTER SYMPTOMS
EYES NEGATIVE: 1
CONSTITUTIONAL NEGATIVE: 1
ENDOCRINE NEGATIVE: 1
MUSCULOSKELETAL NEGATIVE: 1
RESPIRATORY NEGATIVE: 1
ALLERGIC/IMMUNOLOGIC NEGATIVE: 1
GASTROINTESTINAL NEGATIVE: 1
CARDIOVASCULAR NEGATIVE: 1
PSYCHIATRIC NEGATIVE: 1
HEMATOLOGIC/LYMPHATIC NEGATIVE: 1

## 2025-08-14 ASSESSMENT — PATIENT HEALTH QUESTIONNAIRE - PHQ9
SUM OF ALL RESPONSES TO PHQ9 QUESTIONS 1 AND 2: 0
1. LITTLE INTEREST OR PLEASURE IN DOING THINGS: NOT AT ALL
2. FEELING DOWN, DEPRESSED OR HOPELESS: NOT AT ALL

## 2025-08-18 ENCOUNTER — OFFICE VISIT (OUTPATIENT)
Dept: PRIMARY CARE | Facility: CLINIC | Age: 75
End: 2025-08-18
Payer: MEDICARE

## 2025-08-18 VITALS
WEIGHT: 181.2 LBS | HEIGHT: 68 IN | TEMPERATURE: 97.2 F | DIASTOLIC BLOOD PRESSURE: 82 MMHG | BODY MASS INDEX: 27.46 KG/M2 | SYSTOLIC BLOOD PRESSURE: 123 MMHG

## 2025-08-18 DIAGNOSIS — T14.8XXA MUSCLE STRAIN: ICD-10-CM

## 2025-08-18 DIAGNOSIS — E66.3 OVERWEIGHT WITH BODY MASS INDEX (BMI) OF 27 TO 27.9 IN ADULT: ICD-10-CM

## 2025-08-18 DIAGNOSIS — M54.50 ACUTE LEFT-SIDED LOW BACK PAIN WITHOUT SCIATICA: ICD-10-CM

## 2025-08-18 DIAGNOSIS — Z87.891 FORMER SMOKER: ICD-10-CM

## 2025-08-18 PROCEDURE — 99213 OFFICE O/P EST LOW 20 MIN: CPT | Performed by: FAMILY MEDICINE

## 2025-08-18 PROCEDURE — 1160F RVW MEDS BY RX/DR IN RCRD: CPT | Performed by: FAMILY MEDICINE

## 2025-08-18 PROCEDURE — 1159F MED LIST DOCD IN RCRD: CPT | Performed by: FAMILY MEDICINE

## 2025-08-18 PROCEDURE — 3008F BODY MASS INDEX DOCD: CPT | Performed by: FAMILY MEDICINE

## 2025-08-18 RX ORDER — ACETAMINOPHEN 500 MG
TABLET ORAL EVERY 8 HOURS PRN
COMMUNITY

## 2025-08-18 ASSESSMENT — ENCOUNTER SYMPTOMS
PERIANAL NUMBNESS: 0
TINGLING: 0
ABDOMINAL PAIN: 0
PARESIS: 0
CARDIOVASCULAR NEGATIVE: 1
ENDOCRINE NEGATIVE: 1
ALLERGIC/IMMUNOLOGIC NEGATIVE: 1
CONSTITUTIONAL NEGATIVE: 1
RESPIRATORY NEGATIVE: 1
WEIGHT LOSS: 0
PSYCHIATRIC NEGATIVE: 1
LEG PAIN: 0
FEVER: 0
DYSURIA: 0
PARESTHESIAS: 0
HEMATOLOGIC/LYMPHATIC NEGATIVE: 1
NUMBNESS: 0
WEAKNESS: 0
BOWEL INCONTINENCE: 0
HEADACHES: 0
BACK PAIN: 1
EYES NEGATIVE: 1
GASTROINTESTINAL NEGATIVE: 1

## 2025-10-07 ENCOUNTER — APPOINTMENT (OUTPATIENT)
Dept: NEUROLOGY | Facility: CLINIC | Age: 75
End: 2025-10-07
Payer: MEDICARE

## 2026-02-16 ENCOUNTER — APPOINTMENT (OUTPATIENT)
Dept: UROLOGY | Facility: CLINIC | Age: 76
End: 2026-02-16
Payer: MEDICARE

## 2026-03-25 ENCOUNTER — APPOINTMENT (OUTPATIENT)
Dept: PRIMARY CARE | Facility: CLINIC | Age: 76
End: 2026-03-25
Payer: MEDICARE

## (undated) DEVICE — GLOVE, SURGICAL, PROTEXIS PI BLUE W/NEUTHERA, 8.0, PF, LF

## (undated) DEVICE — CATHETER, ACUSON ACUNAV ULTRASOUND, 8FR 90CM

## (undated) DEVICE — CLOSURE SYSTEM, VASCULAR, MVP 6-12FR, VENOUS

## (undated) DEVICE — SYSTEM, ACCESS, FXD DBL CURVE, WATCHMAN

## (undated) DEVICE — BANDAGE, COMPRESSION, W/CLIP, FLEX-MASTER, DOUBLE LENGTH, 6 IN X 11 YD, LF

## (undated) DEVICE — BLADE, SAW, SAGITTAL, 25.0 X 1.27 X 90MM

## (undated) DEVICE — ACCESS KIT, S-MAK MINI, 4FR 10CM 0.018IN 40CM, NT/PT, ECHO ENHANCE NEEDLE

## (undated) DEVICE — SUTURE, VICRYL, 1, 27 IN, CT-1 CR, UNDYED

## (undated) DEVICE — DRAPE, SHEET, THREE QUARTER, FAN FOLD, 57 X 77 IN

## (undated) DEVICE — GOWN, SURGICAL, SMARTGOWN, XX-LARGE, STERILE

## (undated) DEVICE — SUTURE, STARTAFIX, SYMMETRIC, PDS PLUS, 60CM CT-1

## (undated) DEVICE — HOOD, STERISHIELD T4 SYSTEM

## (undated) DEVICE — DEVICE, CLOSURE, PERCLOSE, PROSTYLE

## (undated) DEVICE — SUTURE, STRATAFIX, 3-0, SPIRAL MONOCRYL PLUS, PS, 70CM, UNDYED

## (undated) DEVICE — Device

## (undated) DEVICE — DRAPE, INSTRUMENT, W/POUCH, STERI DRAPE, 7 X 11 IN, DISPOSABLE, STERILE

## (undated) DEVICE — COVER HANDLE LIGHT, STERIS, BLUE, STERILE

## (undated) DEVICE — VERSACROSS KIT, ACCESS SOLUTION, RF WIRE 180CM

## (undated) DEVICE — GUIDEWIRE, INQWIRE, 3MM J, .035, 150

## (undated) DEVICE — SUTURE, VICRYL, 2-0, 36 IN, CT-1, UNDYED

## (undated) DEVICE — ELECTRODE, QUICK-COMBO, EDGE SYSTEM, REDI PACK

## (undated) DEVICE — SHEATH, PINNACLE, 10 CM,  7FR INTRODUCER, 7FR DIA, 2.5 CM DIALATOR

## (undated) DEVICE — GLOVE, SURGICAL, PROTEXIS PI , 7.5, PF, LF

## (undated) DEVICE — SOLUTION, IRRIGATION, STERILE WATER, 1000 ML, POUR BOTTLE

## (undated) DEVICE — SOLUTION, IRRIGATION, SODIUM CHLORIDE 0.9%, 1000 ML, POUR BOTTLE

## (undated) DEVICE — SHEATH, PINNACLE, W/O GUIDEWIRE, 8FR INTRODUCER,  8FR DIA, 2.5 CM DILATOR

## (undated) DEVICE — GOWN, ASTOUND, XL

## (undated) DEVICE — SHEATH, PINNACLE, 10 CM,  8FR INTRODUCER, 8FR DIA, 2.5 CM DIALATOR

## (undated) DEVICE — TOWEL PACK, STERILE, 4/PACK, BLUE

## (undated) DEVICE — CATHETER, ANGIO, IMPULSE, PIG 155 DEG, 5 FR X 110 CM

## (undated) DEVICE — DRESSING, MEPILEX BORDER, POST-OP AG, 4 X 12 IN

## (undated) DEVICE — CLOSURE SYSTEM, DERMABOND, PRINEO, 22CM, STERILE

## (undated) DEVICE — DRESSING, ABDOMINAL, WET PRUF, TENDERSORB, 5 X 9 IN, STERILE

## (undated) DEVICE — TRAY, SURESTEP, SILICONE DRAINAGE BAG, STATLOCK, 16FR

## (undated) DEVICE — SOLUTION, IRRIGATION, USP, SODIUM CHLORIDE 0.9%, 3000 ML, BAG